# Patient Record
Sex: MALE | Race: BLACK OR AFRICAN AMERICAN | NOT HISPANIC OR LATINO | Employment: STUDENT | URBAN - METROPOLITAN AREA
[De-identification: names, ages, dates, MRNs, and addresses within clinical notes are randomized per-mention and may not be internally consistent; named-entity substitution may affect disease eponyms.]

---
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
## 2023-02-10 ENCOUNTER — TELEPHONE (OUTPATIENT)
Dept: FAMILY MEDICINE | Facility: CLINIC | Age: 18
End: 2023-02-10
Payer: COMMERCIAL

## 2023-02-10 NOTE — TELEPHONE ENCOUNTER
Pt situation escalated to manager. Manager able to get pt a appt on 2/15. Pt mother informed, greatly thanked our office and verbalized understanding.

## 2023-02-10 NOTE — TELEPHONE ENCOUNTER
----- Message from Gemini Tanner sent at 2/10/2023 12:16 PM CST -----  Contact: self  Type: Same Day Appointment Request        Caller is requesting a same day appointment. Caller declined first available appointment listed below.        Name of Caller: Patient    Best Call Back Number: [phone]  Additional Information: Patient mom states this pt is disabled he is trying to get into a program which they already accepted him he just has to be seen by a NP or  A Doctor to approve his paperwork Pt is a former pt of Dr. Peterson [facility]. PLZ GET PT INTO his cut off for his application is next week. CALL MOM TO SCHEDULE WITH ANY PROVIDER. THANKS

## 2023-02-10 NOTE — TELEPHONE ENCOUNTER
Called and spoke with pt mother. Informed mother that at this time we don't have any np appts available. Pt mother verbalized understanding.

## 2023-02-15 ENCOUNTER — LAB VISIT (OUTPATIENT)
Dept: LAB | Facility: HOSPITAL | Age: 18
End: 2023-02-15
Attending: STUDENT IN AN ORGANIZED HEALTH CARE EDUCATION/TRAINING PROGRAM
Payer: COMMERCIAL

## 2023-02-15 ENCOUNTER — OFFICE VISIT (OUTPATIENT)
Dept: FAMILY MEDICINE | Facility: CLINIC | Age: 18
End: 2023-02-15
Payer: COMMERCIAL

## 2023-02-15 VITALS
OXYGEN SATURATION: 96 % | BODY MASS INDEX: 29.72 KG/M2 | HEIGHT: 63 IN | HEART RATE: 80 BPM | SYSTOLIC BLOOD PRESSURE: 124 MMHG | RESPIRATION RATE: 18 BRPM | WEIGHT: 167.75 LBS | DIASTOLIC BLOOD PRESSURE: 86 MMHG

## 2023-02-15 DIAGNOSIS — R62.50 DEVELOPMENT DELAY: ICD-10-CM

## 2023-02-15 DIAGNOSIS — Z00.00 ROUTINE GENERAL MEDICAL EXAMINATION AT A HEALTH CARE FACILITY: Primary | ICD-10-CM

## 2023-02-15 DIAGNOSIS — Z00.00 ROUTINE GENERAL MEDICAL EXAMINATION AT A HEALTH CARE FACILITY: ICD-10-CM

## 2023-02-15 LAB
ALBUMIN SERPL BCP-MCNC: 4.5 G/DL (ref 3.5–5.2)
ALP SERPL-CCNC: 108 U/L (ref 55–135)
ALT SERPL W/O P-5'-P-CCNC: 26 U/L (ref 10–44)
ANION GAP SERPL CALC-SCNC: 10 MMOL/L (ref 8–16)
AST SERPL-CCNC: 18 U/L (ref 10–40)
BASOPHILS # BLD AUTO: 0.05 K/UL (ref 0–0.2)
BASOPHILS NFR BLD: 0.9 % (ref 0–1.9)
BILIRUB SERPL-MCNC: 1.1 MG/DL (ref 0.1–1)
BUN SERPL-MCNC: 12 MG/DL (ref 6–20)
CALCIUM SERPL-MCNC: 9.8 MG/DL (ref 8.7–10.5)
CHLORIDE SERPL-SCNC: 108 MMOL/L (ref 95–110)
CHOLEST SERPL-MCNC: 161 MG/DL (ref 120–199)
CHOLEST/HDLC SERPL: 4.4 {RATIO} (ref 2–5)
CO2 SERPL-SCNC: 24 MMOL/L (ref 23–29)
CREAT SERPL-MCNC: 1.2 MG/DL (ref 0.5–1.4)
DIFFERENTIAL METHOD: ABNORMAL
EOSINOPHIL # BLD AUTO: 0.1 K/UL (ref 0–0.5)
EOSINOPHIL NFR BLD: 1.4 % (ref 0–8)
ERYTHROCYTE [DISTWIDTH] IN BLOOD BY AUTOMATED COUNT: 12.6 % (ref 11.5–14.5)
EST. GFR  (NO RACE VARIABLE): >60 ML/MIN/1.73 M^2
GLUCOSE SERPL-MCNC: 85 MG/DL (ref 70–110)
HCT VFR BLD AUTO: 52.1 % (ref 40–54)
HDLC SERPL-MCNC: 37 MG/DL (ref 40–75)
HDLC SERPL: 23 % (ref 20–50)
HGB BLD-MCNC: 17.6 G/DL (ref 14–18)
IMM GRANULOCYTES # BLD AUTO: 0.03 K/UL (ref 0–0.04)
IMM GRANULOCYTES NFR BLD AUTO: 0.5 % (ref 0–0.5)
LDLC SERPL CALC-MCNC: 107.8 MG/DL (ref 63–159)
LYMPHOCYTES # BLD AUTO: 2.1 K/UL (ref 1–4.8)
LYMPHOCYTES NFR BLD: 36.6 % (ref 18–48)
MCH RBC QN AUTO: 27.8 PG (ref 27–31)
MCHC RBC AUTO-ENTMCNC: 33.8 G/DL (ref 32–36)
MCV RBC AUTO: 82 FL (ref 82–98)
MONOCYTES # BLD AUTO: 0.5 K/UL (ref 0.3–1)
MONOCYTES NFR BLD: 9.5 % (ref 4–15)
NEUTROPHILS # BLD AUTO: 2.9 K/UL (ref 1.8–7.7)
NEUTROPHILS NFR BLD: 51.1 % (ref 38–73)
NONHDLC SERPL-MCNC: 124 MG/DL
NRBC BLD-RTO: 0 /100 WBC
PLATELET # BLD AUTO: 279 K/UL (ref 150–450)
PMV BLD AUTO: 12.1 FL (ref 9.2–12.9)
POTASSIUM SERPL-SCNC: 3.5 MMOL/L (ref 3.5–5.1)
PROT SERPL-MCNC: 7.4 G/DL (ref 6–8.4)
RBC # BLD AUTO: 6.33 M/UL (ref 4.6–6.2)
SODIUM SERPL-SCNC: 142 MMOL/L (ref 136–145)
TRIGL SERPL-MCNC: 81 MG/DL (ref 30–150)
WBC # BLD AUTO: 5.71 K/UL (ref 3.9–12.7)

## 2023-02-15 PROCEDURE — 3074F SYST BP LT 130 MM HG: CPT | Mod: CPTII,S$GLB,, | Performed by: STUDENT IN AN ORGANIZED HEALTH CARE EDUCATION/TRAINING PROGRAM

## 2023-02-15 PROCEDURE — 99395 PR PREVENTIVE VISIT,EST,18-39: ICD-10-PCS | Mod: S$GLB,,, | Performed by: STUDENT IN AN ORGANIZED HEALTH CARE EDUCATION/TRAINING PROGRAM

## 2023-02-15 PROCEDURE — 99999 PR PBB SHADOW E&M-EST. PATIENT-LVL IV: CPT | Mod: PBBFAC,,, | Performed by: STUDENT IN AN ORGANIZED HEALTH CARE EDUCATION/TRAINING PROGRAM

## 2023-02-15 PROCEDURE — 80061 LIPID PANEL: CPT | Performed by: STUDENT IN AN ORGANIZED HEALTH CARE EDUCATION/TRAINING PROGRAM

## 2023-02-15 PROCEDURE — 36415 COLL VENOUS BLD VENIPUNCTURE: CPT | Mod: PO | Performed by: STUDENT IN AN ORGANIZED HEALTH CARE EDUCATION/TRAINING PROGRAM

## 2023-02-15 PROCEDURE — 1160F RVW MEDS BY RX/DR IN RCRD: CPT | Mod: CPTII,S$GLB,, | Performed by: STUDENT IN AN ORGANIZED HEALTH CARE EDUCATION/TRAINING PROGRAM

## 2023-02-15 PROCEDURE — 3074F PR MOST RECENT SYSTOLIC BLOOD PRESSURE < 130 MM HG: ICD-10-PCS | Mod: CPTII,S$GLB,, | Performed by: STUDENT IN AN ORGANIZED HEALTH CARE EDUCATION/TRAINING PROGRAM

## 2023-02-15 PROCEDURE — 1159F PR MEDICATION LIST DOCUMENTED IN MEDICAL RECORD: ICD-10-PCS | Mod: CPTII,S$GLB,, | Performed by: STUDENT IN AN ORGANIZED HEALTH CARE EDUCATION/TRAINING PROGRAM

## 2023-02-15 PROCEDURE — 3008F BODY MASS INDEX DOCD: CPT | Mod: CPTII,S$GLB,, | Performed by: STUDENT IN AN ORGANIZED HEALTH CARE EDUCATION/TRAINING PROGRAM

## 2023-02-15 PROCEDURE — 1160F PR REVIEW ALL MEDS BY PRESCRIBER/CLIN PHARMACIST DOCUMENTED: ICD-10-PCS | Mod: CPTII,S$GLB,, | Performed by: STUDENT IN AN ORGANIZED HEALTH CARE EDUCATION/TRAINING PROGRAM

## 2023-02-15 PROCEDURE — 80053 COMPREHEN METABOLIC PANEL: CPT | Performed by: STUDENT IN AN ORGANIZED HEALTH CARE EDUCATION/TRAINING PROGRAM

## 2023-02-15 PROCEDURE — 99395 PREV VISIT EST AGE 18-39: CPT | Mod: S$GLB,,, | Performed by: STUDENT IN AN ORGANIZED HEALTH CARE EDUCATION/TRAINING PROGRAM

## 2023-02-15 PROCEDURE — 1159F MED LIST DOCD IN RCRD: CPT | Mod: CPTII,S$GLB,, | Performed by: STUDENT IN AN ORGANIZED HEALTH CARE EDUCATION/TRAINING PROGRAM

## 2023-02-15 PROCEDURE — 3079F DIAST BP 80-89 MM HG: CPT | Mod: CPTII,S$GLB,, | Performed by: STUDENT IN AN ORGANIZED HEALTH CARE EDUCATION/TRAINING PROGRAM

## 2023-02-15 PROCEDURE — 85025 COMPLETE CBC W/AUTO DIFF WBC: CPT | Performed by: STUDENT IN AN ORGANIZED HEALTH CARE EDUCATION/TRAINING PROGRAM

## 2023-02-15 PROCEDURE — 99999 PR PBB SHADOW E&M-EST. PATIENT-LVL IV: ICD-10-PCS | Mod: PBBFAC,,, | Performed by: STUDENT IN AN ORGANIZED HEALTH CARE EDUCATION/TRAINING PROGRAM

## 2023-02-15 PROCEDURE — 3008F PR BODY MASS INDEX (BMI) DOCUMENTED: ICD-10-PCS | Mod: CPTII,S$GLB,, | Performed by: STUDENT IN AN ORGANIZED HEALTH CARE EDUCATION/TRAINING PROGRAM

## 2023-02-15 PROCEDURE — 3079F PR MOST RECENT DIASTOLIC BLOOD PRESSURE 80-89 MM HG: ICD-10-PCS | Mod: CPTII,S$GLB,, | Performed by: STUDENT IN AN ORGANIZED HEALTH CARE EDUCATION/TRAINING PROGRAM

## 2023-02-15 NOTE — PROGRESS NOTES
"[facility] CLINIC NOTE    Patient Name: Allyson Estrada  YOB: 2005    PRESENTING HISTORY     History of Present Illness:  Mr. Allyson Estrada is a 17 y.o. male     Approved for extra care.   Help with sitting, job placement.   Form completed today. This resembles a 90L but is not labeled as such.     PMHx: developmental delay since age 1. Brain injury.   Surg: myringotomy  Fhx:No colon cancer. No prostate cancer  Social: Lives with mom    ROS      OBJECTIVE:   Vital Signs:  Vitals:    02/15/23 1056   BP: 124/86   Pulse: 80   Resp: 18   SpO2: 96%   Weight: 76.1 kg (167 lb 12.3 oz)   Height: 5' 2.5" (1.588 m)         Physical Exam  Vitals and nursing note reviewed.   Constitutional:       General: He is not in acute distress.     Appearance: He is not toxic-appearing or diaphoretic.   HENT:      Head: Normocephalic and atraumatic.      Right Ear: External ear normal.      Left Ear: External ear normal.   Eyes:      General: No scleral icterus.     Conjunctiva/sclera: Conjunctivae normal.      Pupils: Pupils are equal, round, and reactive to light.   Neck:      Thyroid: No thyromegaly.   Cardiovascular:      Rate and Rhythm: Normal rate and regular rhythm.      Heart sounds: Normal heart sounds. No murmur heard.  Pulmonary:      Effort: Pulmonary effort is normal. No respiratory distress.      Breath sounds: Normal breath sounds. No wheezing or rales.   Musculoskeletal:         General: No tenderness or deformity. Normal range of motion.      Cervical back: Normal range of motion and neck supple.   Lymphadenopathy:      Cervical: No cervical adenopathy.   Skin:     General: Skin is warm and dry.      Findings: No erythema or rash.   Neurological:      Mental Status: He is alert.      Gait: Gait is intact.      Comments: Speech is slow. Recall poor. Oriented to birthdate, current year.   No focal deficits.   Hyperreflexia bilateral patellae   Psychiatric:         Mood and Affect: Mood and affect " normal.         Cognition and Memory: Memory normal.         Judgment: Judgment normal.     ASSESSMENT & PLAN:     Routine general medical examination at a health care facility  -     CBC Auto Differential; Future; Expected date: 02/15/2023  -     Comprehensive Metabolic Panel; Future; Expected date: 02/15/2023  -     Lipid Panel; Future; Expected date: 02/15/2023    Development delay  Forms completed       Jude Méndez MD   Internal Medicine

## 2024-01-23 ENCOUNTER — TELEPHONE (OUTPATIENT)
Dept: PRIMARY CARE CLINIC | Facility: CLINIC | Age: 19
End: 2024-01-23
Payer: COMMERCIAL

## 2024-02-19 ENCOUNTER — OFFICE VISIT (OUTPATIENT)
Dept: FAMILY MEDICINE | Facility: CLINIC | Age: 19
End: 2024-02-19
Payer: COMMERCIAL

## 2024-02-19 VITALS
HEIGHT: 67 IN | DIASTOLIC BLOOD PRESSURE: 78 MMHG | WEIGHT: 194 LBS | BODY MASS INDEX: 30.45 KG/M2 | RESPIRATION RATE: 16 BRPM | SYSTOLIC BLOOD PRESSURE: 128 MMHG

## 2024-02-19 DIAGNOSIS — Z00.00 ROUTINE GENERAL MEDICAL EXAMINATION AT A HEALTH CARE FACILITY: Primary | ICD-10-CM

## 2024-02-19 DIAGNOSIS — G93.1 ANOXIC BRAIN INJURY: ICD-10-CM

## 2024-02-19 PROCEDURE — 3074F SYST BP LT 130 MM HG: CPT | Mod: CPTII,S$GLB,, | Performed by: STUDENT IN AN ORGANIZED HEALTH CARE EDUCATION/TRAINING PROGRAM

## 2024-02-19 PROCEDURE — 99395 PREV VISIT EST AGE 18-39: CPT | Mod: S$GLB,,, | Performed by: STUDENT IN AN ORGANIZED HEALTH CARE EDUCATION/TRAINING PROGRAM

## 2024-02-19 PROCEDURE — 3078F DIAST BP <80 MM HG: CPT | Mod: CPTII,S$GLB,, | Performed by: STUDENT IN AN ORGANIZED HEALTH CARE EDUCATION/TRAINING PROGRAM

## 2024-02-19 PROCEDURE — 1159F MED LIST DOCD IN RCRD: CPT | Mod: CPTII,S$GLB,, | Performed by: STUDENT IN AN ORGANIZED HEALTH CARE EDUCATION/TRAINING PROGRAM

## 2024-02-19 PROCEDURE — 3008F BODY MASS INDEX DOCD: CPT | Mod: CPTII,S$GLB,, | Performed by: STUDENT IN AN ORGANIZED HEALTH CARE EDUCATION/TRAINING PROGRAM

## 2024-02-19 PROCEDURE — 99999 PR PBB SHADOW E&M-EST. PATIENT-LVL IV: CPT | Mod: PBBFAC,,, | Performed by: STUDENT IN AN ORGANIZED HEALTH CARE EDUCATION/TRAINING PROGRAM

## 2024-02-19 PROCEDURE — 1160F RVW MEDS BY RX/DR IN RCRD: CPT | Mod: CPTII,S$GLB,, | Performed by: STUDENT IN AN ORGANIZED HEALTH CARE EDUCATION/TRAINING PROGRAM

## 2024-02-19 NOTE — PROGRESS NOTES
"[facility] MEDICINE CLINIC NOTE    Patient Name: Allyson Estrada  YOB: 2005    PRESENTING HISTORY     History of Present Illness:  Mr. Allyson Estrada is a 18 y.o. male here for annual.     No sig change sin his health.     Doing well and with no complaints.     Mom reports some difficulty with fine motor skills, can't tie shoes. Was getting some therapy at school, but no longer in school. Seemed to be benefiting.     Issues with memory, where things are located in the house. Speech delays.     He is very active. Doing pushups, pullups et cetera. He has gained about 30 lbs in the last year.         ROS      OBJECTIVE:   Vital Signs:  Vitals:    02/19/24 1559 02/19/24 1608   BP: 134/86 128/78   Resp: 16    Weight: 88 kg (194 lb 0.1 oz)    Height: 5' 2.5" (1.588 m)             Physical Exam  Vitals and nursing note reviewed.   Constitutional:       Appearance: He is not diaphoretic.   HENT:      Right Ear: Tympanic membrane and external ear normal.      Left Ear: Tympanic membrane and external ear normal.   Eyes:      General: No scleral icterus.     Conjunctiva/sclera: Conjunctivae normal.      Pupils: Pupils are equal, round, and reactive to light.   Neck:      Thyroid: No thyromegaly.   Cardiovascular:      Rate and Rhythm: Normal rate and regular rhythm.      Heart sounds: Normal heart sounds. No murmur heard.  Pulmonary:      Effort: Pulmonary effort is normal. No respiratory distress.      Breath sounds: Normal breath sounds. No wheezing or rales.   Musculoskeletal:         General: No tenderness or deformity. Normal range of motion.      Cervical back: Normal range of motion and neck supple.   Lymphadenopathy:      Cervical: No cervical adenopathy.   Skin:     General: Skin is warm and dry.      Findings: No erythema or rash.   Neurological:      Mental Status: He is alert and oriented to person, place, and time.      Gait: Gait is intact.      Deep Tendon Reflexes: Reflexes abnormal " (hyperreflexic bilateral patellae.).   Psychiatric:         Mood and Affect: Mood and affect normal.         Cognition and Memory: Memory normal.         Judgment: Judgment normal.         ASSESSMENT & PLAN:     Routine general medical examination at a health care facility    Anoxic brain injury  -     SLP evaluation; Future  -     Ambulatory referral/consult to Physical/Occupational Therapy; Future; Expected date: 02/26/2024      He is in generally good health.   Reviewed and filled out 90L forms today.     Will see if he can get more OT/SLP for ADL assistance.         Jude Méndez  Internal Medicine        Visit complexity inherent to evaluation and management associated with medical care services that serve as the continuing focal point for all needed health care services and/or with medical care services that are part of ongoing care related to a patient's single, serious condition or a complex condition provided today

## 2024-02-22 DIAGNOSIS — G93.1 ANOXIC BRAIN INJURY: Primary | ICD-10-CM

## 2024-02-26 ENCOUNTER — CLINICAL SUPPORT (OUTPATIENT)
Dept: REHABILITATION | Facility: HOSPITAL | Age: 19
End: 2024-02-26
Attending: STUDENT IN AN ORGANIZED HEALTH CARE EDUCATION/TRAINING PROGRAM
Payer: COMMERCIAL

## 2024-02-26 DIAGNOSIS — Z74.09 IMPAIRED FUNCTIONAL MOBILITY, BALANCE, AND ENDURANCE: Primary | ICD-10-CM

## 2024-02-26 DIAGNOSIS — G93.1 ANOXIC BRAIN INJURY: ICD-10-CM

## 2024-02-26 DIAGNOSIS — R27.8 IMPAIRED GROSS MOTOR COORDINATION: ICD-10-CM

## 2024-02-26 PROCEDURE — 97161 PT EVAL LOW COMPLEX 20 MIN: CPT | Mod: PO

## 2024-02-26 NOTE — PROGRESS NOTES
Thank you for your referral. Please see Treatment tab for Initial Evaluation and Plan of Care. Thanks!

## 2024-02-27 ENCOUNTER — CLINICAL SUPPORT (OUTPATIENT)
Dept: REHABILITATION | Facility: HOSPITAL | Age: 19
End: 2024-02-27
Attending: STUDENT IN AN ORGANIZED HEALTH CARE EDUCATION/TRAINING PROGRAM
Payer: COMMERCIAL

## 2024-02-27 DIAGNOSIS — G93.1 ANOXIC BRAIN INJURY: ICD-10-CM

## 2024-02-27 PROBLEM — R27.8 IMPAIRED GROSS MOTOR COORDINATION: Status: ACTIVE | Noted: 2024-02-27

## 2024-02-27 PROBLEM — Z74.09 IMPAIRED FUNCTIONAL MOBILITY, BALANCE, AND ENDURANCE: Status: ACTIVE | Noted: 2024-02-27

## 2024-02-27 PROCEDURE — 92522 EVALUATE SPEECH PRODUCTION: CPT | Mod: PO

## 2024-02-27 NOTE — PLAN OF CARE
"[facility]   Physical Therapy Initial Evaluation      Name: Allyson Estrada  Clinic Number: [id number]    Therapy Diagnosis:   Encounter Diagnoses   Name Primary?    Anoxic brain injury     Impaired functional mobility, balance, and endurance Yes    Impaired gross motor coordination         Physician: Jude Méndez MD    Physician Orders: PT Eval and Treat Neuro Program   Medical Diagnosis from Referral: Anoxic brain injury [G93.1]   Evaluation Date: 2/26/2024  Authorization Period Expiration: 2/18/2025  Plan of Care Expiration: 4/22/24  Progress Note Due: 3/25/24  Date of Surgery: NA  Visit # / Visits authorized: 1/ 1   FOTO: 1/ 3    Precautions: Standard, Fall, and developmental delay     Time In: 1600 (Pt arrived early)   Time Out: 1648  Total Billable Time: 48 minutes    Subjective     Date of onset: 2005 (brain injury due to heat stroke)    History of current condition - Emerson reports: Mom reports patient has occasional issues with balance. Has a "weird little run". Has difficulty climbing stairs and requires assistance from mom.     Per Family Medicine Note (Jude Méndez MD) on 2/19/24:     History of Present Illness:  Mr. Allyson Estrada is a 18 y.o. male here for annual.      No sig changes in his health.      Doing well and with no complaints.      Mom reports some difficulty with fine motor skills, can't tie shoes. Was getting some therapy at school, but no longer in school. Seemed to be benefiting.      Issues with memory, where things are located in the house. Speech delays.      He is very active. Doing pushups, pullups et cetera. He has gained about 30 lbs in the last year.     Per Family Medicine Note (Jude Méndez MD) on 2/15/23    PMHx: developmental delay since age 1. Brain injury.   Surg: myringotomy  Fhx:No colon cancer. No prostate cancer  Social: Lives with mom    Falls: Denies falls, reports catching himself. Has near falls "sometimes"    Imaging: " "none    Prior Therapy: Had therapy in school prior to COVID shutdown in 2020. No therapy since then.   Social History: Mom and step-dad. Two steps to enter.   Occupation: Not employed   Prior Level of Function: Able to do most activities on his own. Needs help tying shoes.   Current Level of Function: Able to do most activities on his own. Needs help tying shoes.     Pain:  Current 0/10  Location: NA  Description: NA  Aggravating Factors: NA  Easing Factors: NA    Patients goals: Go up and down stairs faster and feel more confident. Learn how balance to ride a bike.      Medical History:   Past Medical History:   Diagnosis Date    Heat stroke 4-2005    Speech delay        Surgical History:   Allyson Estrada  has a past surgical history that includes Tympanostomy tube placement; feeding tube placement ; and Central venous catheter insertion.    Medications:   Allyson currently has no medications in their medication list.    Allergies:   Review of patient's allergies indicates:  No Known Allergies     Objective      Mental status: Patient with delayed processing speed and speech delay. Required assist from mom during interview. Responded best to simple verbal cues and demonstration.        Vision/ Auditory: Mom reports pt has an astigmatism, has glasses but does not wear them. Pt denies difficulty with vision.     Vitals:   Blood Pressure: 123/81 mmHg  Heart Rate: 62 bpm    Posture:   Sitting: WNL  Standing: WNL     Transfers:    Transfers Level of Assistance  Comments   Roll Right Ind.    Roll Left "Ind.    Roll Supine "Ind.    Roll Prone "Ind.    Supine to Sit "Ind.    Sit to Supine "Ind.    Sit to Stand "Ind.    Stand to Sit "Ind.    Transfer from bed to chair "Ind.    Car transfer "Ind.    Toilet Transfer "Ind.        Strength:      Right Lower Extremity Strength Grade Left Lower Extremity Strength Grade   Hip Flexion 4/5 Hip Flexion 4/5   Hip Extension 3/5  Full hip bridge against gravity Hip Extension 3/5  Full " "hip bridge against gravity   Hip Abduction 4+/5 Hip Abduction 4/5   Knee Flexion 4/5 Knee Flexion 4/5   Knee Extension 5/5 Knee Extension 5/5   Ankle Dorsiflexion 5/5 Ankle Dorsiflexion 4+/5   Ankle Plantarflexion 4+/5 Ankle Plantarflexion 4+/5       Coordination:    Upper Extremity:    Dysdiadochokinesia: Impaired bilaterally, decreased speed and impaired sequencing    Finger to nose: Slightly impaired bilaterally (inconsistently returns to nose)     Lower Extremity:    Alternating toe taps: Impaired, unable to sequence    Heel-shin: Intact      Light touch sensation:    Right Lower Extremity: Intact   Left Lower Extremity: Intact    Proprioception: Intact bilateral ankles      Modified Kurt: Not Assessed due to clinical judgement    Balance:    Static sitting balance:Normal  Dynamic sitting balance: Normal    SLS: < 2 sec bilaterally    Ambulation:     Gait Assessment:   - AD used: None  - Assistance: Supervision   - Distance: Community distances  - Speed: 1.2 m/s  - Stairs: Mod I with use of one handrail, reciprocal stepping pattern    Gait Analysis:  Upon observation of gait, patient demonstrates deviations including but not limited to: wide base of support, bilateral toe out, asymmetric stepping pattern, arms held in low guard      Impairments contributing to deviations: balance deficits, history of developmental delay      APTA Core Set Outcome Measures for Adults with Neurologic Conditions    Evaluation  Reference values    Timed Up and Go 10.3 sec (average of 3 trials);   score >14 seconds indicates risk for falls in older stroke patients (Leif et al, 2006)   10 Meter Walk Test  1.2 m/sec (6m/5s)   "community ambulator" speed category 0-0.4 m/s = household walker  0.4-0.8 m/s = limited community ambulator   0.8-1.4 m/s = community ambultor  >1.4 m/s = can cross street safely    Functional Gait Assessment  19/30 <22/30 = identifies fall risk in community dwelling older adults   (MCID = 4)   Trupti " Balance Test Not assessed due to clinical judgement    Fall risk cut-off for stroke= 45/56   6 Minute Walk Test   Assess in upcoming visit.  6 Minute Walk Test Distances: Means by Age and Gender    Age Gender Mean   60-69 Female  Male 572 meters (1876 feet)  538 meters (1765 feet)   70-79 Female  Male 527 meters (1729 feet)  471 meters (1545 feet)   80-89 Female  Male 417 meters (1368 feet)  392 meters ( 1286 feet)    LEONOR Mcmullen (2000)      5 times sit-stand   16 seconds   >12 sec= fall risk for general elderly  >10 sec= balance/vestibular dysfunction (<59 y/o)  >14.2 sec= balance/vestibular dysfunction (>59 y/o)  >12 sec= fall risk for stroke     Functional Gait Assessment:   1. Gait on level surface =  3   (3) Normal: less than 5.5 sec, no A.D., no imbalance, normal gait pattern, deviates< 6in   (2) Mild impairment: 7-5.6 sec, uses A.D., mild gait deviations, or deviates 6-10 in   (1) Moderate impairment: > 7 sec, slow speed, imbalance, deviates 10-15 in.   (0) Severe impairment: needs assist, deviates >15 in, reach/touch wall  2. Change in Gait Speed = 2   (3) Normal: smooth change w/o loss of balance or gait deviation, deviates < 6 in, significant difference between speeds   (2) Mild impairment: changes speed, but demonstrates mild gait deviations, deviates 6-10 in, OR no deviations but unable to significantly speed, OR uses A.D.   (1) Moderate impairment: minor changes to speed, OR changes speed w/ significant deviations, deviates 10-15 in, OR  Changes speed , but loses balance & recovers   (0) Severe impairment: cannot change speed, deviates >15 in, or loses balance & needs assist  3. Gait with horizontal head turns  = 2   (3) Normal: no change in gait, deviates <6 in   (2) Mild impairment: slight change in speed, deviates 6-10 in, OR uses A.D.   (1) Moderate impairment: moderate change in speed, deviates 10-15 in   (0) Severe impairment: severe disruption of gait, deviates >15in  4. Gait with vertical head  turns = 2   (3) Normal: no change in gait, deviates <6 in   (2) Mild impairment: slight change in speed, deviates 6-10 in OR uses A.D.   (1) Moderate impairment: moderate change in speed, deviates 10-15 in   (0) Severe impairment: severe disruption of gait, deviates >15 in  5. Gait with pivot turns = 2   (3) Normal: performs safely in 3 sec, no LOB   (2) Mild impairment: performs in >3 sec & no LOB, OR turns safely & requires several steps to regain LOB   (1) Moderate impairment: turns slow, OR requires several small steps for balance following turn & stop   (0) Severe impairment: cannot turn safely, needs assist  6. Step over obstacle = 1   (3) Normal: steps over 2 stacked boxes w/o change in speed or LOB   (2) Mild impairment: able to step over 1 box w/o change in speed or LOB   (1) Moderate impairment: steps over 1 box but must slow down, may require VC   (0) Severe impairment: cannot perform w/o assist  7. Gait with Narrow INDIRA = 1   (3) Normal: 10 steps no staggering   (2) Mild impairment: 7-9 steps   (1) Moderate impairment: 4-7 steps   (0) Severe impairment: < 4 steps or cannot perform w/o assist  8. Gait with eyes closed = 3   (3) Normal: < 7 sec, no A.D., no LOB, normal gait pattern, deviates <6 in   (2) Mild impairment: 7.1-9 sec, mild gait deviations, deviates 6-10 in   (1) Moderate impairment: > 9 sec, abnormal pattern, LOB, deviates 10-15 in   (0) Severe impairment: cannot perform w/o assist, LOB, deviates >15in  9. Ambulating Backwards = 1   (3) Normal: no A.D., no LOB, normal gait pattern, deviates <6in   (2) Mild impairment: uses A.D., slower speed, mild gait deviations, deviates 6-10 in   (1) Moderate impairment: slow speed, abnormal gait pattern, LOB, deviates 10-15 in   (0) Severe impairment: severe gait deviations or LOB, deviates >15in  10. Steps = 2   (3) Normal: alternating feet, no rail   (2) Mild Impairment: alternating feet, uses rail   (1) Moderate impairment: step-to, uses rail   (0)  "Severe impairment: cannot perform safely    Score 19/30     Score:   <22/30 fall risk   <20/30 fall risk in older adults   <18/30 fall risk in Parkinsons         FOTO Balance Survey:       Therapist reviewed FOTO scores for Allyson Estrada on 2/26/2024.   FOTO documents entered into [facility] [employer] - see Media section.    Functional Status Score: 51          Treatment     No treatment provided today due to time spent conducting patient interview, objective tests and measures and patient education.     Patient Education and Home Exercises     Education provided:   - Educated on role of PT in outpatient setting.   - Educated on PT Plan of Care.   - Education and instruction on all outcome measures performed today.     Written Home Exercises Provided: Provide in upcoming visit.     Assessment     Allyson is a 18 y.o. male referred to outpatient Physical Therapy with a medical diagnosis of Anoxic brain injury [G93.1]. Patient presents with signs and symptoms consistent with diagnosis outlined in "therapy diagnoses" section of initial evaluation. Additionally, he is limited in safety and independence with functional mobility secondary to above impairments. During evaluation today, patient participated in assessment of lower extremity muscle strength via MMT. he presents with minor deficits in strength. During today's evaluation, he performed standardized outcome measures to assess standing balance, gait speed, and fall risk including the Functional Gait Assessment, Timed Up and Go, 10-Meter Walk Test and 5 Time Sit to Stand Test. Scores on the Functional Gait Assessment and 5- Time Sit to Stand Test identified that he is at an increased risk for falls and decreased independence with mobility. According to the 10-Meter Walk Test, he is categorized as a community ambulator. The patient also demonstrated impairments in coordination and bilateral single leg stance.     Allyson is a good candidate for outpatient physical therapy to address " impairments identified in today's evaluation, increase his capacity for community- level activities and high level balance activities and reduce his risk for falls.    Allyson is highly motivated which will positively impact his recovery.    Upon completion of evaluation, patient (and mother) were provided with education regarding goals and plan of care with which he/she/they verbalized understanding and agreement.       Patient prognosis is Good.   Patient will benefit from skilled outpatient Physical Therapy to address the deficits stated above and in the chart below, provide patient /family education, and to maximize patientt's level of independence.     Plan of care discussed with patient: Yes  Patient's spiritual, cultural and educational needs considered and patient is agreeable to the plan of care and goals as stated below:     Anticipated Barriers for therapy: None at this time    Medical Necessity is demonstrated by the following  History  Co-morbidities and personal factors that may impact the plan of care [] LOW: no personal factors / co-morbidities  [x] MODERATE: 1-2 personal factors / co-morbidities  [] HIGH: 3+ personal factors / co-morbidities    Moderate / High Support Documentation:   Co-morbidities affecting plan of care:   Past Medical History:   Diagnosis Date    Heat stroke 4-2005    Speech delay        Personal Factors:   Developmental delay     Examination  Body Structures and Functions, activity limitations and participation restrictions that may impact the plan of care [] LOW: addressing 1-2 elements  [] MODERATE: 3+ elements  [x] HIGH: 4+ elements (please support below)    Moderate / High Support Documentation: Strength, sensation, coordination, proprioception, ambulation, balance     Clinical Presentation [x] LOW: stable  [] MODERATE: Evolving  [] HIGH: Unstable     Decision Making/ Complexity Score: low       Goals:  Short Term Goals: (4 weeks) Date established: Status:    1. Patient will  be provided with an individualized home exercise program.  2/26/24 New   2.  Patient will demonstrate improve endurance and activity tolerance as evidenced by ability to perform Nu-step x 15 minutes without rests breaks with heart rate post-activity equivalent to 50-80% of maximum heart rate.  2/26/24 New       Long Term Goals: (8 weeks) Date established: Status:    1. Patient will be independent and compliant with updated home exercise program. 2/26/24   New   2.  Patient will increase his gait speed as assessed by the Timed 10 Meter Walk Test from 1.2 m/s to >1.4 m/s to increase his safety and independence with gait at a community level. (Minimal Clinically Important Difference for older adults = 0.13 m/s)   2/26/24   New     3. Patient will improve his score on the Functional Gait Assessment (FGA) from 19/30 to at least 23/30, indicating improved safety and (I) with dynamic, community level gait. (Minimal Detectable Change for older adults= 4 points)  2/26/24 New   4. Patient will maintain bilateral single leg stance for at least 15 seconds to improve standing balance and overall safety with functional tasks.  2/26/24 New   5. Patient will perform 5 sit to stands in < 10 seconds to demonstrate a reduction in fall risk.  2/26/24 New   6. Patient will begin some form of community fitness to begin regular and consistent performance of exercise to continue maintenance of gains made in physical therapy.  2/26 24 New     Plan     Plan of care Certification: 2/26/2024 to 4/22/24.    Outpatient Physical Therapy 2 times weekly for 8 weeks to include the following interventions: Gait Training, Neuromuscular Re-ed, Patient Education, Therapeutic Activities, and Therapeutic Exercise.     Brianna Lindsey PT        Physician's Signature: _________________________________________ Date: ________________

## 2024-02-27 NOTE — Clinical Note
Hi Dr. Méndez,  We completed an speech therapy evaluation with this patient. Given some voice complaints his mother mentioned and it being 10 years since seen by ENT, we recommend him see ENT for further assessment of vocal function. If agreed, would you mind placing an order for ENT?   Thank you for your referral!  KALLI Boyle, CCC-SLP, IS Speech-Language Pathology 2/27/2024

## 2024-02-27 NOTE — PLAN OF CARE
[facility]  Speech Therapy Evaluation -Neurological Rehabilitation    Date: 2/27/2024     Name: Allyson Estrada   MRN: [id number]    Therapy Diagnosis:   Encounter Diagnosis   Name Primary?    Anoxic brain injury     Physician: Jude Méndez MD  Physician Orders: Ambulatory Referral to Speech Therapy   Medical Diagnosis: Anoxic brain injury [G93.1]     Visit # / Visits Authorized:  1 / 1   Date of Evaluation:  2/27/2024   Insurance Authorization Period: 2/19/2024 to 12/31/2024  Plan of Care Certification:    2/27/2024 to 4/23/2024      Time In: 1610  Time Out: 1655   Total time: 45    Procedure   Evaluation of Speech Sound Production     Precautions: Standard  Subjective   Date of Onset: CVA at 1 years old  History of Current Condition:  Allyson Estrada is a 18 y.o. male who presents to [facility] Outpatient Speech Therapy for evaluation secondary to Anoxic brain injury [G93.1] . Patient was referred to therapy by Jude Méndez MD , which is the patient's primary care physician. 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 Patient's mother reports majority of speech therapy consisted in the schools with supplemental outside of the schools. Patient's mother reports patient then received speech therapy in schools only from 6th grade until 2020. Patient did not return back to school after covid pandemic. Patient and mother deny changes in receptive language, dysphagia, or attention. Patient and mother report difficulty with speech intelligibility and word finding, short term memory. Patient's mother reports low volume - last seen by ENT (per mother) about 10 years ago.   Patient was accompanied to the evaluation by Nancy, his mother.   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  Allyson Estrada  has a past surgical history that includes Tympanostomy tube placement; feeding tube placement ; and Central venous  "catheter insertion.  Medical Hx and Allergies: Allyson currently has no medications in their medication list. Review of patient's allergies indicates:  No Known Allergies    Per PCP note on 2/19/2024 with Dr. Méndez:  History of Present Illness:  Mr. Allyson Estrada is a 18 y.o. male here for annual.      No sig change sin his health.      Doing well and with no complaints.      Mom reports some difficulty with fine motor skills, can't tie shoes. Was getting some therapy at school, but no longer in school. Seemed to be benefiting.      Issues with memory, where things are located in the house. Speech delays.      He is very active. Doing pushups, pullups et cetera. He has gained about 30 lbs in the last year.     Prior Therapy:  speech therapy from 1 years old - 2020  Social History:  Patient lives with family  Occupation:  n/a  Prior Level of Function: improved verbal expression (patient's mother reports patient initially nonverbal)   Current Level of Function: continued difficulty regarding speech intelligibility, low vocal volume,   Pain Scale: no pain indicated throughout session  Patient's Therapy Goals:  mother: "to be able to communicate with other people better - not to repeat as often"  Objective   Formal Assessment:  MOTOR SPEECH/DYSARTHRIA EVALUATION    History  Diagnosis: CVA (unable to locate location in patient chart or in interview with patient's mother)  Localization: n/a  Associated Deficits: Cognitive/Linguistic    Communication Related Quality of Life  The Communication Participation Item Bank was administered to the patient to assess for the impact of [his/her] motor speech deficit on [his/her] quality of life.  T-Score: 38.4  Standard Error: 2.8  Communication QOL Severity: average  Reference scores for the CPIB. Please note that the score ranges should be interpreted with  relation to the reference group, which is 700 people with various communication disorders.  Therefore, a T-score in the " average range would be interpreted as Average communication related  quality of life compared to most people with communication disorders. Someone with  no communication deficits would receive a score of 74.3.  Below Average Average Above Average   17.8-34 35-65 66-74.3       Evaluation of Speech Mechanisms  Respiration:  Posture: WNL  Breath Support: WNL  Breath Rate: WNL, Pt completed 20 breaths in one minute. The norm for breath rate of an adult is between 12 and 18 cycles per minute.   Respiratory Features: Abnormal: Thoracic    Oral Mechanism at Rest   Labial Structures  Structure WNL   Symmetry WNL   Tone WNL   Ability to control secretions WNL       Lingual Structure (at rest in mouth)   Structure WNL   Symmetry WNL   Tone WNL   Irregular Movement WNL       Mandible  Symmetry WNL   Posture at Rest WNL=closed   Dentition WNL     Face  Symmetry WNL   Expression WNL   Irregular Movement WNL     Soft Palate  Symmetry WNL   Structure WNL     Hard Palate  Structure WNL     Oral Mechanism during Sustained Postures   Labial Retraction   Symmetry WNL   Range WNL   Tone WNL   Strength WNL     Labial Protrusion  Range WNL   Tone WNL   Strength WNL     Labial Compression  Strength (Upper R) WNL   Strength (Upper L) WNL   Strength (Lower R) WNL   Strength (Lower L) WNL     Lingual Protrusion  Symmetry WNL   Range WNL   Tone WNL   Tremor WNL   Strength WNL     Lingual Elevation to Alveolar Ridge   Range WNL   Symmetry WNL     Mandible Depression  Symmetry WNL   Range WNL   Jaw Clonus WNL   Strength WNL     Mandible Elevation  Symmetry WNL   Range WNL   Strength WNL     Face: Raising Eyebrows  Symmetry WNL   Range WNL   Forehead Wrinkle WNL     Velopharyngeal Function: Cheek Puffing   Symmetry WNL   Range WNL   Tone WNL   Strength WNL       Oral Mechanism during Movement   Labial Protrusion and Lateralization   Rate WNL   Rhythm WNL     Lingual Lateralization (External)   Rate WNL   Rhythm WNL   Range WNL     Lingual  Lateralization (Internal)  Rate WNL   Rhythm WNL   Range WNL     Circular Range of Motion (External)   Rate WNL   Rhythm WNL   Range WNL     Circular Range of Motion (Internal)  Rate WNL   Rhythm WNL   Range WNL     Velopharyngeal Function: Sustained /a/  Velum Range WNL   Pharyngeal Wall Range Reduced     Velopharyngeal Function: Short Repeating /a/  Velum Range Reduced   Pharyngeal Wall Range Reduced     Gross Sensation  Sensation  Face: Upper L WNL    Upper R WNL    Lower L WNL    Lower R WNL   Lips: Upper L WNL    Upper R WNL    Lower L WNL    Lower R WNL         Alternating Motion Rates (AMRs) and Sequential Motion Rates (SMRs)  SMRs /pu/  Rate Slow   Rhythm Irregular   Accuracy WNL   Norm 5.0-7.1 Ghada/Sec Below norm     SMRs /tu/  Rate Slow   Rhythm Irregular   Accuracy WNL   Norm 4.8-7.1 Ghada/Sec Below norm     SMRs /ku/  Rate Slow   Rhythm Irregular   Accuracy WNL   Norm 4.4-7.5 Ghada/Sec Below norm     AMRs /putuku/  Rate Slow   Rhythm Irregular   Accuracy WNL   Norm 3.6-7.5 Ghada/Sec Below norm     Perceptual Ratings  Respiratory Features: Forced inspiration/expiration  Respiratory/Phonatory Features: Reduced/Excessive loudness  Phonatory Features: Abnormal pitch  Phonatory Quality: Harshness  Articulatory Features: Irregular articulatory breakdowns  Prosodic Features: Slow rate  Other Features: low vocal volume    Diagnosis   OVERALL IMPRESSION:   Patient presents with Severe Ataxic dysarthria characterized by slow articulatory breakdowns, slow rate, low vocal loudness, excess stress.     Treatment   Total Treatment Time Separate from Evaluation: not applicable   No treatment performed secondary to time to complete evaluation.     Education provided:   -role of Speech Therapy, goals/plan of care, scheduling/cancellations, insurance limitations with patient  -Additional Education provided:   Prognosis and speech therapy Plan of Care     Patient and family members expressed understanding.     Home Program: not  established this visit  Assessment     Emerson presents to [facility] status post medical diagnosis of Anoxic brain injury [G93.1 .     Interpretation of objective assessment:   He presents with Severe Ataxic dysarthria characterized by slow articulatory breakdowns, slow rate, low vocal loudness, excess stress.     Demonstrates impairments including limitations as described in the problem list.     Positive prognostic factors: good motivation and family support  Negative prognostic factors: time elapsed since CVA  Barriers to therapy: No barriers to therapy identified.     Patient's spiritual, cultural, and educational needs considered and patient agreeable to plan of care and goals.    Patient will benefit from skilled therapy.    Rehab Potential: fair    Short Term Goals: (6 weeks) Current Progress:   Patient will complete RBANS assessment to determine further speech therapy Plan of Care    Progressing/ Not Met 2/27/2024   Established this date   2. Patient will recall 4/4 clear speech strategies with minimal assist     Progressing/ Not Met 2/27/2024   Established this date   3. Patient will read aloud low level passage with independently use of clear speech strategies with 80% accuracy independently.      Progressing/ Not Met 2/27/2024   Established this date    4. Patient will complete diaphragmatic breathing exercises by producing maximal exhalation via diaphragmic breathing with a duration of 5 seconds in 10 trials, consistently with minimal tactile and verbal clinician cues.      Progressing/ Not Met 2/27/2024   Established this date    5. Patient will complete ENT assessment for clearance of PhoRTE exercises in speech therapy.      Progressing/ Not Met 2/27/2024   Established this date        Long Term Goals: (8 weeks) Current Progress:   He  will develop functional and intelligible speech and utilize compensatory strategies through the use of adequate labial and lingual function, increased  articulatory precision and speech prosody.   Established this date     2. He will use appropriate memory strategies to schedule and recall weekly activities, express needs and recall names to maintain safety and participate socially in functional living environment.       Established this date     3. He  will develop functional cognitive-linguistic based skills and utilize compensatory strategies to communicate wants and needs effectively to different conversational partners, maintain safety, and participate socially in functional living environment.        Established this date     4. He  will demonstrate improved vocal quality and intonation at the conversation level to communicate basic medical and social needs in the functional living environment.       Established this date         Plan     Recommended Treatment Plan:  Patient will participate in the [facility] program for speech therapy 2 times per week for 8 weeks to address his Communication, Cognition, and Motor Speech deficits, to educate patient and their family, and to participate in a home exercise program.    Other Recommendations:   Referral to Otolaryngology (ENT)    Therapist's Name:   Hilda Diop CCC-SLP   2/27/2024

## 2024-02-29 NOTE — PROGRESS NOTES
"  [facility]   Physical Therapy Treatment Note      Name: Allyson Estrada  Clinic Number: [id number]      Therapy Diagnosis:   Encounter Diagnoses   Name Primary?    Impaired functional mobility, balance, and endurance Yes    Impaired gross motor coordination      Physician: Jude Méndez MD    Visit Date: 3/1/2024    Physician Orders: PT Eval and Treat Neuro Program   Medical Diagnosis from Referral: Anoxic brain injury [G93.1]   Evaluation Date: 2/26/2024  Authorization Period Expiration: 2/18/2025  Plan of Care Expiration: 4/22/24  Progress Note Due: 3/25/24  Date of Surgery: NA  Visit # / Visits authorized: 1/ 20 ( 2)   FOTO: 1/ 3    Time In: 8:48 am   Time Out: 9:30 am   Total Billable Time: 42 minutes    Precautions: Standard, Fall, and developmental delay     PTA visit 0/5      Subjective     Pt reports: . Nodding head "yes" when asked how he was doing today.     He was not yet given home exercise program.  Response to previous treatment: good  Functional change: ongoing    Pain: 0/10  Location: NA      Objective      Objective Measures updated at progress report unless specified.     Treatment     Emerson received the treatments listed below:        Emerson received therapeutic exercises to develop strength, endurance, and ROM for 34 minutes including:  -  sci fit level 2 10 minutes  bilateral upper extremity/ lower extremity     In parallel bars :   - standing mini squats 1x15. Cues for proper form  - standing marches 2x10 each lower extremity.   - standing hip abduction 2x10 each lower extremity   - standing hip extension 2x10 each lower extremity   - standing hamstring curls 2x10 each lower extremity   - standing calf raises 2x10 bilateral lower extremity   - standing calf stretch on foam half roll 30 sec x 3 trials each lower extremity      (Therapeutic rest breaks throughout as needed).       Emerson participated in neuromuscular re-education activities to improve: Balance and " Coordination for 8 minutes. The following activities were included:    - standing tandem with small step 30 sec x 3 trials each lower extremity with occasional 1 upper extremity support on bars.   - standing single leg stances 3-5 seconds each lower extremity x 10 trials each    Pt ambulated 120 ft x 2 trials supervision . Noted toe talking and mildly unsteady throughout      Home Exercises Provided and Patient Education Provided     Education provided:   - HEP handout given and went over in detail. Pt reports understanding. Requires reinforcement.     Written Home Exercises Provided: yes.  Exercises were reviewed and Emerson was able to demonstrate them prior to the end of the session.  Emerson demonstrated fair  understanding of the education provided.     See EMR under Patient Instructions for exercises provided 3/1/2024.    Assessment     Tolerated session well. Able to tolerate 10 minutes on sci fit with no issues. Pt given HEP handout and went over in detail. requests frequent rest breaks throughout session. Pt remains motivated to improve and a good candidate for PT.     Emerson Is progressing well towards his goals.   Pt prognosis is Good.     Pt will continue to benefit from skilled outpatient physical therapy to address the deficits listed in the problem list box on initial evaluation, provide pt/family education and to maximize pt's level of independence in the home and community environment.     Pt's spiritual, cultural and educational needs considered and pt agreeable to plan of care and goals.     Anticipated barriers to physical therapy: none at this time.     Goals: Goals:  Short Term Goals: (4 weeks) Date established: Status:    1. Patient will be provided with an individualized home exercise program.  2/26/24 MET   2.  Patient will demonstrate improve endurance and activity tolerance as evidenced by ability to perform Nu-step x 15 minutes without rests breaks with heart rate post-activity equivalent to  50-80% of maximum heart rate.  2/26/24 ongoing         Long Term Goals: (8 weeks) Date established: Status:    1. Patient will be independent and compliant with updated home exercise program. 2/26/24    ongoing   2.  Patient will increase his gait speed as assessed by the Timed 10 Meter Walk Test from 1.2 m/s to >1.4 m/s to increase his safety and independence with gait at a community level. (Minimal Clinically Important Difference for older adults = 0.13 m/s)    2/26/24    ongoing      3. Patient will improve his score on the Functional Gait Assessment (FGA) from 19/30 to at least 23/30, indicating improved safety and (I) with dynamic, community level gait. (Minimal Detectable Change for older adults= 4 points)  2/26/24 ongoing   4. Patient will maintain bilateral single leg stance for at least 15 seconds to improve standing balance and overall safety with functional tasks.  2/26/24 ongoing   5. Patient will perform 5 sit to stands in < 10 seconds to demonstrate a reduction in fall risk.  2/26/24 ongoing   6. Patient will begin some form of community fitness to begin regular and consistent performance of exercise to continue maintenance of gains made in physical therapy.  2/26 24 ongoing        Plan   Plan of care Certification: 2/26/2024 to 4/22/24.     Outpatient Physical Therapy 2 times weekly for 8 weeks to include the following interventions: Gait Training, Neuromuscular Re-ed, Patient Education, Therapeutic Activities, and Therapeutic Exercise.     Recommendations for next treatment session:     Balance exercises    Estela Ann, PT

## 2024-03-01 ENCOUNTER — CLINICAL SUPPORT (OUTPATIENT)
Dept: REHABILITATION | Facility: HOSPITAL | Age: 19
End: 2024-03-01
Payer: COMMERCIAL

## 2024-03-01 DIAGNOSIS — R27.8 IMPAIRED GROSS MOTOR COORDINATION: ICD-10-CM

## 2024-03-01 DIAGNOSIS — Z74.09 IMPAIRED FUNCTIONAL MOBILITY, BALANCE, AND ENDURANCE: Primary | ICD-10-CM

## 2024-03-01 PROCEDURE — 97110 THERAPEUTIC EXERCISES: CPT | Mod: PO

## 2024-03-01 PROCEDURE — 97112 NEUROMUSCULAR REEDUCATION: CPT | Mod: 59,PO

## 2024-03-04 ENCOUNTER — CLINICAL SUPPORT (OUTPATIENT)
Dept: REHABILITATION | Facility: HOSPITAL | Age: 19
End: 2024-03-04
Payer: COMMERCIAL

## 2024-03-04 DIAGNOSIS — R27.8 IMPAIRED GROSS MOTOR COORDINATION: ICD-10-CM

## 2024-03-04 DIAGNOSIS — Z74.09 IMPAIRED FUNCTIONAL MOBILITY, BALANCE, AND ENDURANCE: Primary | ICD-10-CM

## 2024-03-04 DIAGNOSIS — R47.1 DYSARTHRIA: Primary | ICD-10-CM

## 2024-03-04 PROCEDURE — 92507 TX SP LANG VOICE COMM INDIV: CPT | Mod: PO

## 2024-03-04 PROCEDURE — 97110 THERAPEUTIC EXERCISES: CPT | Mod: PO

## 2024-03-04 PROCEDURE — 97112 NEUROMUSCULAR REEDUCATION: CPT | Mod: PO

## 2024-03-04 NOTE — PROGRESS NOTES
[facility]  Speech Therapy Treatment Note- Neurological Rehabilitation  Date: 3/4/2024     Name: Allyson Estrada   MRN: [id number]   Therapy Diagnosis: No diagnosis found.Physician: Jude Méndez MD  Physician Orders: Ambulatory Referral to Speech Therapy   Medical Diagnosis: Anoxic brain injury [G93.1]     Visit #/ Visits Authorized: 1/ 20  Date of Evaluation:  2/27/2024  Insurance Authorization Period: 2/28/2024 - 12/31/2024   Plan of Care Expiration Date:    4/23/2024  Extended Plan of Care:  not applicable    Progress Note: due 3/27/2024     Time In:  1700  Time Out:  1740  Total Billable Time: 40     Precautions: Standard and Communication  Subjective:   Patient reports: No major changes from previous session, hesitant to interact with new Speech Language Pathologist    He was compliant to home exercise program.   Response to previous treatment: good  Pain Scale: no pain indicated throughout session  Objective:         UNTIMED  Speech- Language- Voice Therapy       Short Term Goals: (6 weeks) Current Progress:   Patient will complete RBANS assessment to determine further speech therapy Plan of Care See results below    Goal Met 3/4/2024     2. Patient will recall 4/4 clear speech strategies with minimal assist      Progressing/ Not Met 3/4/2024    Worked on loud speech throughout the dynamic testing listed below   3. Patient will read aloud low level passage with independently use of clear speech strategies with 80% accuracy independently.       Progressing/ Not Met 3/4/2024    Not addressed in today's session.    4. Patient will complete diaphragmatic breathing exercises by producing maximal exhalation via diaphragmic breathing with a duration of 5 seconds in 10 trials, consistently with minimal tactile and verbal clinician cues.       Progressing/ Not Met 3/4/2024    Not addressed in today's session.    5. Patient will complete ENT assessment for clearance of PhoRTE exercises in speech  therapy.       Progressing/ Not Met 3/4/2024    Not addressed in today's session.    6. Patient will name objects with 90% accuracy independently.     Progressing/Not Met 3/4/2024  GOAL ADDED   7. Patient will state the function of objects using 3+ word sentences with minimal cues.     Progressing/Not Met 3/4/2024  GOAL ADDED      The Repeatable Battery for the Assessment of Neuropsychological Status (RBANS) Version B was administered to measure the patient's attention, language, visuospatial/constructional abilities, and immediate and delayed memory. The results are outlined below:    Domain Subtest Total Score Index Score   Immediate Memory List Learning 12   40    Story Memory 4    Visuospatial/  Constructional Figure Copy 10   50    Line Orientation 0    Language Picture Naming 7   47    Semantic Fluency 4    Attention Digit Span DNT Not applicable     Coding DNT      Delayed Memory List Recall DNT Not applicable     List Recognition DNT     Story Recall DNT     Figure Recall DNT    Interpretation:    Index score: mean of 100, standard deviation of 15. Therefore, 130+ = Very Superior; 120-129 = Superior; 110-119 = High average;  = Average; 80-89 = Low Average; 70-79 = Borderline; 69 and below = Extremely Low. Apparently the most reliable score; factor in education level.    Adjusted to a dynamic assessment due to limitations of language skills on the efficacy of the assessment.    Immediate Memory Score: Recalling information following immediate presentation is assessed through the List Learning and Story Memory subtests. In the List Learning subtest, the patient is given 10 words to remember. This list is presented four times overall. In this subtest, the patient did demonstrate learning over the 4 trials. The patient recalled 2 items on trial one, 3 items on trial two, 3 items on trial three, and 4 items in trial 4. In the Story Memory subtest, the patient recalled 2 details on the first presentation and  2 details on the second presentation. Results of this domain indicate Extremely low performance.  Visuospatial score: Perceiving spatial relations and constructing a spatially accurate copy of a drawing is assessed through the Figure Copy and Line Orientation subtests. The Figure Copy subtest asks the patient to copy a complex line drawing. The patient attempted the task; however, difficulty with proportions were noted throughout the completed image. The Line Orientation subtest the presents the patient with 12 line displays and asks the patient to match two given lines at the bottom to the display at the top.  The patient correctly identified 0/20. Results of this domain indicate Extremely low performance.  Language score: Naming common items and retrieving learned material is assessed through the Picture Naming and Semantic Fluency subtests. The Picture Naming subtest asks the patient to name 10 line drawings. The patient was able to accurately name 7/10 items. The Semantic Fluency subtest asks the patient to name as many animals as he can in 60 seconds. The patient was able to name 4 animals. These results indicate Extremely low performance.     Western Aphasia Battery - Revised (WAB-R) was administered to evaluate the patient's receptive and expressive language function.The purpose stated in the manual for the WAB-R is to determine the presence, severity, and type of aphasia; measure the patient's level of performance to provide a baseline for detecting change over time; provide a comprehensive assessment of the patients language strengths and deficits in order to guide treatment and management; infer the location and etiology of the lesion causing the aphasia.  The following results were revealed:     Subtest Results:   Yes / No Questions: 57 / 60  Repetition: 56 /100  Object Namin /60  Word Fluency: 4 / 20  Sentence Completion: 10 /10  Responsive Speech: 8 / 10    Description: With naming, the patient did  benefit intermittently with phonemic cues. During the word fluency task, the patient was able to name 4 items within a minute independently which is the same amount noted on the RBANS testing. However, with categorical cues, the patient was able to name 19 item total, ignoring the time limit.     Patient Education/Response:   Patient educated regarding the followin. Using his loud voice    Home program established: yes-use of loud voice  Patient verbalized understanding to all above education provided.     See Electronic Medical Record under Patient Instructions for exercises provided throughout therapy.  Assessment:   Standardized assessment of cognitive functioning and language attempted this date; however, standard score, were not accurately reflecting his current abilities. Therefore, assessment was changed to a dynamic assessments to assess strong areas to use in therapy. Patient benefited from phonemic cues and cues for thought organization. Unclear severity of memory impairment as patient's expressive receptive language delay limits the efficacy of the assessment. 2 goals added    Emerson is progressing well towards his goals. Current goals remain appropriate. Goals to be updated as necessary.     Patient prognosis is Good. Patient will continue to benefit from skilled outpatient speech and language therapy to address the deficits listed in the problem list on initial evaluation, provide patient/family education and to maximize patient's level of independence in the home and community environment.   Medical necessity is demonstrated by the following IMPAIRMENTS:  Aphasia: decreased content words and significant word finding difficulty in all situations severely limiting functional communication with both familiar and unfamiliar communication partners to relay medically and safety relevant information in a timely manner in a state of emergency.    Motor speech: Decreased speech intelligibility results in  decreased efficiency communicating medical and social wants and needs in the case of emergency.    Barriers to Therapy: none identified   Patient's spiritual, cultural and educational needs considered and patient agreeable to plan of care and goals.  Plan:   Continue Plan of Care with focus on rehabilitation and compensation for cognitive communication, dysarthria, and expressive receptive language disorder.    Dayanna Rangel CCC-SLP   3/4/2024

## 2024-03-04 NOTE — PROGRESS NOTES
"  [facility]   Physical Therapy Treatment Note      Name: Allyson Estrada  Clinic Number: [id number]      Therapy Diagnosis:   Encounter Diagnoses   Name Primary?    Impaired functional mobility, balance, and endurance Yes    Impaired gross motor coordination        Physician: Jude Méndez MD    Visit Date: 3/4/2024    Physician Orders: PT Eval and Treat Neuro Program   Medical Diagnosis from Referral: Anoxic brain injury [G93.1]   Evaluation Date: 2/26/2024  Authorization Period Expiration: 2/18/2025  Plan of Care Expiration: 4/22/24  Progress Note Due: 3/25/24  Date of Surgery: NA  Visit # / Visits authorized: 2/ 20 (3)  FOTO: 1/ 3    Time In: 1602  Time Out: 1645  Total Billable Time: 43 minutes    Precautions: Standard, Fall, and developmental delay     PTA visit 0/5      Subjective     Pt reports: "Good." Mom reports patient has been working out a lot at home.     He was not compliant with home exercise program. Reports doing some push-ups.     Response to previous treatment: good  Functional change: ongoing    Pain: Did not rate, reports "a little bit" of pain   Location: bilateral lower extremities       Objective      Objective Measures updated at progress report unless specified.     Treatment     Emerson received the treatments listed below:      Emerson received therapeutic exercises to develop strength, endurance, and ROM for 25 minutes including:    - Recumbent bike x 10 minutes   - 2x10 33 # Sports Art hamstring curl with cues to slowly lower weight   - 2x10 33 # Sports Art knee extension with cues to slowly lower weight   - 2x10 sit <> stand with arms across chest     (Therapeutic rest breaks throughout as needed).     Emerson participated in neuromuscular re-education activities to improve: Balance and Coordination for 18 minutes. The following activities were included:    In parallel bars:   - x4 laps forward stepping over 4 hurdles with reciprocal pattern, no upper extremity " "support   - x4 laps side stepping over 4 hurdles with single upper extremity support, increased time for foot placement and occasional losses in balance   - x4 laps tandem walking, single upper extremity support   - 2x10 right and left toe taps to 6" step with no upper extremity support   - 2x 30 seconds right and left split stance with one foot on 6 inch step, occasional use of single upper extremity for balance   - 3x10" right and left single leg stance with single upper extremity support     Pt ambulated 120 ft x 2 trials supervision. Noted toe walking throughout.       Home Exercises Provided and Patient Education Provided     Education provided:   - Proper form for all exercises included in today's session.      Written Home Exercises Provided: In previous session.   Exercises were reviewed.  Emerson demonstrated fair  understanding of the education provided.     See EMR under Patient Instructions for exercises provided 3/1/2024.    Assessment     Emerson tolerated session well with focus on bilateral lower extremity strength and balance. Able to tolerate 10 minutes on recumbent bike with no reports of fatigue and occasional changes in direction of pedal stroke which improved with cues from PT. Pt demonstrated improvements in single leg balance today. Able to safely forward step over obstacles with increased time and no upper extremity support. Demo occasional losses in balance when side stepping over obstacles. Pt remains motivated to improve and a good candidate for PT.    Emerson Is progressing well towards his goals.   Pt prognosis is Good.     Pt will continue to benefit from skilled outpatient physical therapy to address the deficits listed in the problem list box on initial evaluation, provide pt/family education and to maximize pt's level of independence in the home and community environment.     Pt's spiritual, cultural and educational needs considered and pt agreeable to plan of care and goals.     Anticipated " barriers to physical therapy: none at this time.     Goals: Goals:  Short Term Goals: (4 weeks) Date established: Status:    1. Patient will be provided with an individualized home exercise program.  2/26/24 MET   2.  Patient will demonstrate improve endurance and activity tolerance as evidenced by ability to perform Nu-step x 15 minutes without rests breaks with heart rate post-activity equivalent to 50-80% of maximum heart rate.  2/26/24 ongoing         Long Term Goals: (8 weeks) Date established: Status:    1. Patient will be independent and compliant with updated home exercise program. 2/26/24    ongoing   2.  Patient will increase his gait speed as assessed by the Timed 10 Meter Walk Test from 1.2 m/s to >1.4 m/s to increase his safety and independence with gait at a community level. (Minimal Clinically Important Difference for older adults = 0.13 m/s)    2/26/24    ongoing      3. Patient will improve his score on the Functional Gait Assessment (FGA) from 19/30 to at least 23/30, indicating improved safety and (I) with dynamic, community level gait. (Minimal Detectable Change for older adults= 4 points)  2/26/24 ongoing   4. Patient will maintain bilateral single leg stance for at least 15 seconds to improve standing balance and overall safety with functional tasks.  2/26/24 ongoing   5. Patient will perform 5 sit to stands in < 10 seconds to demonstrate a reduction in fall risk.  2/26/24 ongoing   6. Patient will begin some form of community fitness to begin regular and consistent performance of exercise to continue maintenance of gains made in physical therapy.  2/26 24 ongoing        Plan   Plan of care Certification: 2/26/2024 to 4/22/24.     Outpatient Physical Therapy 2 times weekly for 8 weeks to include the following interventions: Gait Training, Neuromuscular Re-ed, Patient Education, Therapeutic Activities, and Therapeutic Exercise.     Recommendations for next treatment session:     Balance  exercises    Brianna Lindsey, PT

## 2024-03-05 ENCOUNTER — CLINICAL SUPPORT (OUTPATIENT)
Dept: REHABILITATION | Facility: HOSPITAL | Age: 19
End: 2024-03-05
Attending: STUDENT IN AN ORGANIZED HEALTH CARE EDUCATION/TRAINING PROGRAM
Payer: COMMERCIAL

## 2024-03-05 DIAGNOSIS — R53.1 DECREASED STRENGTH: Primary | ICD-10-CM

## 2024-03-05 DIAGNOSIS — G93.1 ANOXIC BRAIN INJURY: ICD-10-CM

## 2024-03-05 DIAGNOSIS — R27.8 DECREASED COORDINATION: ICD-10-CM

## 2024-03-05 DIAGNOSIS — Z78.9 DEFICITS IN ACTIVITIES OF DAILY LIVING: ICD-10-CM

## 2024-03-05 PROCEDURE — 97166 OT EVAL MOD COMPLEX 45 MIN: CPT | Mod: PO

## 2024-03-05 NOTE — PROGRESS NOTES
[facility]   Occupational Therapy Initial Neurological Evaluation     Name: Allyson Estrada  Clinic Number: [id number]    Therapy Diagnosis: No diagnosis found.  Physician: Jude Méndez MD    Physician Orders: Eval and Treat  Medical Diagnosis: G93.1 (ICD-10-CM) - Anoxic brain injury   Evaluation Date: 3/5/2024  Insurance Authorization Period Expiration:   Plan of Care Certification Period: 5/28/2024   Progress note due: 4/5/2024, 5/3/2024  Date of Return to MD: To be determined  Visit # / Visits authorized: 1 / 1  FOTO: 0/ 3 To be assessed    Precautions:  Standard    Time In: 4:50 PM  Time Out: 5:35 PM  Total Billable Time: 45 minutes    Subjective     Date of Onset: 2005    History of Current Condition: Pt's mom present in eval today to assist with history and assistance answering questions. Pt suffered anoxic brain injury due to heat stroke. Mom reports development delay.  He received school therapy until Covid, they are seeking continued therapy. Mom states that he is not completely independent at home and has concerns regarding his fine motor skills.    Involved Side: n/a  Dominant Side: Right, sometimes ambidextrous     Surgical Procedure: N/a  Imaging: No imaging available     Previous Therapy: School OT/PT/SLP, SLP outside of school, adaptive PE    Pain:  Pain Related Behaviors Observed: No   Functional Pain Scale Rating 0-10:   0/10 on average    Occupation:  Not currently in school; does not have a job at this time  Working presently: n/a  Duties: Makes bed, folds clothes, hangs clothes, takes trash, dishes in , vacuum room (frequently without prompting)    Functional Limitations/Social History:    Current Functional Status   Home/Living environment: lives with their family    - 1 story home, 2 steps to enter then 1 step down    - DME: n/a       Limitation of Functional Status as follows:   ADLs/IADLs:     - Feeding: Does not fix plate, but able to bring plate to  table, unable to use knife, fills own water bottle, microwave meals, prepares cereal.    - Bathing: takes standing shower, no assistance to reach his back but pt would like to better reach during shower, no assistance required to enter/exit shower (low threshold).    - Dressing: requires assistance with tying shoes, donning socks, and shoe orientation, difficulty with shirt orientation, requires assistance with buttons.  - Grooming: requires min a with brushing hair, goes to hall for shaving face    - Home Management: completes simple meal prep using microwave, makes cereal but no sandwiches due to difficulty gauging portions (condiments/meat), completes chores.    - Driving: n/a    - Bed mobility: (I)    - Hygiene: has trouble with gauging amount of toothpaste    - Toileting: Completes toilet hygiene independently, parent reports inefficiency with wiping often     Leisure: video games, movies (super hero), outside sometimes, painting, WWE    Patient's Goals for Therapy: Shoe tying, independence with ADLs/IADLs, fine motor skills, handwriting    Past Medical History/Physical Systems Review:     Past Medical History:  Allyson Estrada  has a past medical history of Heat stroke and Speech delay.    Past Surgical History:  Allyson Estrada  has a past surgical history that includes Tympanostomy tube placement; feeding tube placement ; and Central venous catheter insertion.    Current Medications:  Allyson currently has no medications in their medication list.    Allergies:  Review of patient's allergies indicates:  No Known Allergies       Objective     Cognitive Exam:  Oriented: Person, Place, Time, and Situation  Behaviors: normal, friendly and cooperative, redirectable, eye contact normal  Follows Commands/attention: Follows multistep  commands  Communication: Delayed responses to questions. Responds in short, appropriate sentences  Memory: Not assessed today   Safety awareness/insight to disability: aware of diagnosis,  treatment, and prognosis  Coping skills/emotional control: Appropriate to situation    Visual/Perceptual: wears glasses, astigmatism     Physical Exam:  Postural examination/scapula alignment: Slouched posture  Joint integrity: No concerns  Skin integrity: No concerns, visible skin intact  Edema: No concerns     Joint Evaluation  AROM  3/5/2024 AROM  3/5/2024    Right  Left    Shoulder flex 0-180 WNL WNL   Shoulder Abd 0-180 WNL WNL   Shoulder ER 0-90 WNL WNL   Shoulder IR 0-90 WNL WNL   Shoulder Extension 0-80 WNL WNL   Shoulder Horizontal adduction 0-90 WNL WNL   Elbow flex/ext 0-150 WNL WNL   Wrist flex 0-80 WFL WFL   Wrist ext 0-70 WFL WFL   Supination 0-80 WNL WNL   Pronation 0-80 WNL WNL     Fist: normal    Strength not assessed due to time constraint      Hand Strength (SALOME Dynamometer, Setting II)   (lbs) Left  3/5/2024  Right  3/5/2024    1 61 43   2 64 47   3 65 49   avg 63.3 46.3   [norms for men aged 20-24: R=121.0 +/- 20.6; L=104.5 +/- 21.8 (Maththeresawetz et al, 1985)]    Pinch Left  3/5/2024  Right  3/5/2024    Lateral 20.5 18   Tip (2 point) 12 9   3 jaw lili 12 10      Gross motor coordination:   LING (Rapid Alternating Movements): impaired  Finger to Nose (5 times): impaired  Finger Flicks (coordination moving from digit flexion to digit extension): slightly sluggish    Box and Block Assessment Left Right   3/5/2024 21 26   [norms for men aged 20-24: R=88.2 +/-8.8; L=86.4 +/-8.5 (Ereniowetz et al, 1985)]    Fine motor coordination:   -   Thumb to Finger Opposition: impaired     9 Hole Peg Test (9HPT) Left Right   3/5/2024 53s 54s   [norms for men aged 21-25: R=16.41s +/-1.65s; L=17.5s +/-1.73s (Edy et al, 2003)]     Tone:  Modified Kurt Scale:   0 - No increase in muscle tone    Sensation:  Allyson  reports no concerns (no numbness/tingling)    Balance:   Static Sit - NORMAL: No deviations seen in posture held statically  Dynamic sit- NORMAL: No deviations seen in posture held  statically  Static Stand - NORMAL: No deviations seen in posture held statically  Dynamic stand - NORMAL: No deviations seen in posture held statically    Endurance Deficit: none                    Intake Outcome Measure for FOTO Survey    Therapist reviewed FOTO scores for Allyson Estrada on 3/5/2024.   FOTO report - see Media section or FOTO account episode details.    Intake Score: Not assessed today         Treatment     Total Treatment time separate from Evaluation time:0 minutes      Home Exercises and Patient Education Provided     Education provided:   -role of OT, goals for OT, scheduling/cancellations, insurance limitations with patient.    Written Home Exercises Provided: Not provided today.      Assessment     Allyson Estrada is a 18 y.o. male referred to outpatient occupational therapy and presents with a medical diagnosis of anoxic brain injury.    Following medical record review it is determined that pt will benefit from occupational therapy services in order to maximize independence at home with ADLs, IADLs, and leisure tasks. The following goals were discussed with the patient and patient is in agreement with them as to be addressed in the treatment plan. The patient's rehab potential is Good.     Anticipated barriers to occupational therapy: Potentially transportation as patient does not drive.    Plan of care discussed with patient: Yes  Patient's spiritual, cultural and educational needs considered and patient is agreeable to the plan of care and goals as stated below:     Medical Necessity is demonstrated by the following  Occupational Profile/History  Co-morbidities and personal factors that may impact the plan of care [x] LOW: Brief chart review  [] MODERATE: Expanded chart review   [] HIGH: Extensive chart review    Moderate / High Support Documentation: n/a     Examination  Performance deficits relating to physical, cognitive or psychosocial skills that result in activity limitations and/or  participation restrictions  [] LOW: addressing 1-3 Performance deficits  [] MODERATE: 3-5 Performance deficits  [x] HIGH: 5+ Performance deficits (please support below)    Moderate / High Support Documentation:    Physical:  Control of Voluntary Movement   Strength  Pinch Strength  Gross Motor Coordination  Fine Motor Coordination    Cognitive:  Communication    Psychosocial:    Social Interaction  Habits  Routines  Rituals  Group Participation     Treatment Options [] LOW: Limited options  [] MODERATE: Several options  [x] HIGH: Multiple options      Decision Making/ Complexity Score: Moderate       The following goals were discussed with the patient and patient is in agreement with them as to be addressed in the treatment plan.     Goals:  Short Term Goals:    Short Term Goals (6 weeks) Status When Last Assessed  Progressing/ Met   1) Pt will complete HEP with minimal verbal cues from parent  progressing 3/5/2024    2) Handwriting to be assessed and goal added  progressing 3/5/2024    3) Pt will increase  strength as evidenced by 5# increase on dynamometer bilaterally R: 46.3#  L: 63.3# progressing 3/5/2024    4) Pt to be able to wash his back in the shower independently with adaptive equipment as needed  progressing 3/5/2024    5) Pts parent to report increased independence with oral hygiene including appropriate amount of toothpaste for task  progressing 3/5/2024    6) Pt will tie his shoes with minimal verbal cues   progressing 3/5/2024    7) Pt will prepare a cold meal from start to finish using appropriately portioned ingredients with minimal verbal cues   progressing 3/5/2024        LongTerm Goals (12 weeks) Status When Last Assessed  Progressing/ Met   1) Pt will increase his Box and Blocks score as evidenced by 10 block increase bilaterally to increase gross motor coordination R: 26  L: 21 progressing 3/5/2024    2) Pt will increase fine motor coordination as evidenced by completion of 9 Hole Peg  test in 40 seconds or less bilaterally   R: 54s  L: 53s progressing 3/5/2024    3) Pt will increase  strength as evidenced by 10# increase on dynamometer bilaterally R: 46.3#  L: 63.3# progressing 3/5/2024    4) Pt will use butter knife appropriately with no safety concerns during meal preparation 5/5 attempts  progressing 3/5/2024    5)  Pt will perform UBD / LBD independently, including buttons, fasteners, tying shoes, and with correct orientation of clothing items   progressing 3/5/2024    6) Pts parent will self report increased efficiency with toilet hygiene   progressing 3/5/2024     Additional functional goals to be added as patient progresses and per his priorities.    Plan   Certification Period/Plan of care expiration: 3/5/2024 to 5/28/2024.    Outpatient Occupational Therapy 2 times weekly for 12 weeks to include the following interventions: Patient Education, Self Care, Therapeutic Activities, and Therapeutic Exercise.    DANNIE Kline  3/5/2024        Physician's Signature: _________________________________________ Date: ________________

## 2024-03-07 ENCOUNTER — CLINICAL SUPPORT (OUTPATIENT)
Dept: REHABILITATION | Facility: HOSPITAL | Age: 19
End: 2024-03-07
Payer: COMMERCIAL

## 2024-03-07 DIAGNOSIS — R27.8 IMPAIRED GROSS MOTOR COORDINATION: ICD-10-CM

## 2024-03-07 DIAGNOSIS — Z74.09 IMPAIRED FUNCTIONAL MOBILITY, BALANCE, AND ENDURANCE: Primary | ICD-10-CM

## 2024-03-07 PROBLEM — Z78.9 DEFICITS IN ACTIVITIES OF DAILY LIVING: Status: ACTIVE | Noted: 2024-03-07

## 2024-03-07 PROBLEM — Z78.9 DECREASED INDEPENDENCE WITH ACTIVITIES OF DAILY LIVING: Status: ACTIVE | Noted: 2024-02-27

## 2024-03-07 PROBLEM — R53.1 DECREASED STRENGTH: Status: ACTIVE | Noted: 2024-03-07

## 2024-03-07 PROCEDURE — 97110 THERAPEUTIC EXERCISES: CPT | Mod: PO

## 2024-03-07 PROCEDURE — 97112 NEUROMUSCULAR REEDUCATION: CPT | Mod: PO

## 2024-03-07 NOTE — PROGRESS NOTES
[facility]   Physical Therapy Treatment Note      Name: Allyson Estrada  Clinic Number: [id number]      Therapy Diagnosis:   Encounter Diagnoses   Name Primary?    Impaired functional mobility, balance, and endurance Yes    Impaired gross motor coordination          Physician: Jude Méndez MD    Visit Date: 3/7/2024    Physician Orders: PT Eval and Treat Neuro Program   Medical Diagnosis from Referral: Anoxic brain injury [G93.1]   Evaluation Date: 2/26/2024  Authorization Period Expiration: 2/18/2025  Plan of Care Expiration: 4/22/24  Progress Note Due: 3/25/24  Date of Surgery: NA  Visit # / Visits authorized: 3/ 20 (4)   FOTO: 1/ 3     Time In: 1551 (Pt arrived early)   Time Out: 1634   Total Billable Time: 43 minutes    Precautions: Standard, Fall, and developmental delay     PTA visit 0/5      Subjective     Pt reports: Mom reports they forgot about home exercise program.     He was not compliant with home exercise program.     Response to previous treatment: good  Functional change: ongoing    Pain: 0/10  Location: NA      Objective      Objective Measures updated at progress report unless specified.     Treatment     Emerson received the treatments listed below:      Emerson received therapeutic exercises to develop strength, endurance, and ROM for 25 minutes including:    - Recumbent bike x 10 minutes   - 2x10 33 # Sports Art hamstring curl at slow pace  - 2x10 33 # Sports Art knee extension at slow pace   - x10 sit <> stand with arms across chest  - x15 sit <> stand with ball toss/ catch      (Therapeutic rest breaks throughout as needed).     Emerson participated in neuromuscular re-education activities to improve: Balance and Coordination for 18 minutes. The following activities were included:    In parallel bars:   - x4 laps forward stepping over 4 hurdles with reciprocal pattern, no upper extremity support   - x4 laps side stepping over 4 hurdles with single upper extremity  "support, increased time for foot placement and occasional losses in balance   - x4 laps tandem walking, single upper extremity support  - 2x10 right and left toe taps to 6" step with no upper extremity support   - 2x15 lateral step up and over 6" step with single upper extremity support   -3x30" right and left single leg stance with single upper extremity support as needed     Pt ambulated 120 ft x 2 trials supervision. Noted toe walking throughout.       Home Exercises Provided and Patient Education Provided     Education provided:   - Proper form for all exercises included in today's session.      Written Home Exercises Provided: In previous session.   Exercises were reviewed.  Emerson demonstrated fair  understanding of the education provided.     See EMR under Patient Instructions for exercises provided 3/1/2024.    Assessment     Emerson tolerated session well with focus on bilateral lower extremity strength and balance. He demonstrated improvements in single leg balance today with ability to maintain standing for longer duration and with slightly decreased upper extremity support. Also demonstrated improvements with balance when side stepping over obstacles. Emerson remains motivated to improve and a good candidate for PT.    Emerson Is progressing well towards his goals.   Pt prognosis is Good.     Pt will continue to benefit from skilled outpatient physical therapy to address the deficits listed in the problem list box on initial evaluation, provide pt/family education and to maximize pt's level of independence in the home and community environment.     Pt's spiritual, cultural and educational needs considered and pt agreeable to plan of care and goals.     Anticipated barriers to physical therapy: none at this time.     Goals: Goals:  Short Term Goals: (4 weeks) Date established: Status:    1. Patient will be provided with an individualized home exercise program.  2/26/24 MET   2.  Patient will demonstrate improve " endurance and activity tolerance as evidenced by ability to perform Nu-step x 15 minutes without rests breaks with heart rate post-activity equivalent to 50-80% of maximum heart rate.  2/26/24 ongoing         Long Term Goals: (8 weeks) Date established: Status:    1. Patient will be independent and compliant with updated home exercise program. 2/26/24    ongoing   2.  Patient will increase his gait speed as assessed by the Timed 10 Meter Walk Test from 1.2 m/s to >1.4 m/s to increase his safety and independence with gait at a community level. (Minimal Clinically Important Difference for older adults = 0.13 m/s)    2/26/24    ongoing      3. Patient will improve his score on the Functional Gait Assessment (FGA) from 19/30 to at least 23/30, indicating improved safety and (I) with dynamic, community level gait. (Minimal Detectable Change for older adults= 4 points)  2/26/24 ongoing   4. Patient will maintain bilateral single leg stance for at least 15 seconds to improve standing balance and overall safety with functional tasks.  2/26/24 ongoing   5. Patient will perform 5 sit to stands in < 10 seconds to demonstrate a reduction in fall risk.  2/26/24 ongoing   6. Patient will begin some form of community fitness to begin regular and consistent performance of exercise to continue maintenance of gains made in physical therapy.  2/26 24 ongoing        Plan   Plan of care Certification: 2/26/2024 to 4/22/24.     Outpatient Physical Therapy 2 times weekly for 8 weeks to include the following interventions: Gait Training, Neuromuscular Re-ed, Patient Education, Therapeutic Activities, and Therapeutic Exercise.     Recommendations for next treatment session:     Balance exercises    Brianna Lindsey, PT

## 2024-03-08 ENCOUNTER — CLINICAL SUPPORT (OUTPATIENT)
Dept: REHABILITATION | Facility: HOSPITAL | Age: 19
End: 2024-03-08
Payer: COMMERCIAL

## 2024-03-08 DIAGNOSIS — R27.8 DECREASED COORDINATION: ICD-10-CM

## 2024-03-08 DIAGNOSIS — Z78.9 DEFICITS IN ACTIVITIES OF DAILY LIVING: ICD-10-CM

## 2024-03-08 DIAGNOSIS — R53.1 DECREASED STRENGTH: Primary | ICD-10-CM

## 2024-03-08 PROCEDURE — 97530 THERAPEUTIC ACTIVITIES: CPT | Mod: PO

## 2024-03-08 PROCEDURE — 97110 THERAPEUTIC EXERCISES: CPT | Mod: PO

## 2024-03-08 NOTE — PROGRESS NOTES
"[facility]   Occupational Therapy Treatment Note     Name: Allyson Estrada  Clinic Number: [id number]    Therapy Diagnosis:   Encounter Diagnoses   Name Primary?    Decreased strength Yes    Deficits in activities of daily living     Decreased coordination      Physician: Jude Méndez MD    Physician Orders: Eval and Treat  Medical Diagnosis: G93.1 (ICD-10-CM) - Anoxic brain injury   Evaluation Date: 3/8/2024  Insurance Authorization Period Expiration:   Plan of Care Certification Period: 5/28/2024   Progress note due: 4/5/2024, 5/3/2024  Date of Return to MD: To be determined  Visit # / Visits authorized: 1/20 (+eval)  FOTO: 1/ 3, last assessed 3/8/2024    Precautions:  Standard    Time In: 0801  Time Out: 0845  Total Billable Time: 44 minutes    Subjective     Pt reports: "Good."  he was compliant with home exercise program given last session, (not given).   Response to previous treatment: no concerns  Functional change: no concerns    Pain: 0/10  Location: N/a      Objective     At 0802, PM=769/75, HR=52             Strength 3/8/2024 3/8/2024   **within available ROM** Right  Left    Shoulder flex 5/5 5/5   Shoulder abd 5/5 5/5   Shoulder ER 5/5 5/5   Shoulder IR 5/5 5/5   Shoulder Extension 5/5 5/5   Shoulder Horizontal adduction nt nt   Elbow flex 4+/5 5/5   Elbow ext 5/5 5/5   Wrist flex 5/5 5/5   Wrist ext 5/5 5/5   Supination 5/5 5/5   Pronation 4+/5 5/5   UD nt nt   RD nt nt     Treatment     Emerson received the treatments listed below:     Therapeutic exercises to develop strength and endurance for 15 minutes, including:    Bilateral upper extremity strength with measurements as noted above.     -UBE x5 min forward/ 5 min back w/ the bilateral UE, level 2.0, standing to increase activity tolerance, strength and coordination for reciprocal movements in the bilateral UE. West avg=12, peak west=18. Patient rated RPE as "easy". During UBE, if therapist tried to engage in " "conversation, patient would stop the task and answer and then restart. Min verbal cues to keep doing the exercise while talking.     X10 squeezes on each hand with Digi-flex 3.2kgs gripper      Therapeutic activities to improve functional performance for 29  minutes, including:    -Patient wrote his name x1 and copied first page of "[place]". Patient skipped words in one sentence. Therapist asked where he was at with the passage and patient was unable to point it out or continue writing after.   Increased time required.     -Pt completed serial opposition with large marbles for 4 groups of 4 to improve FMC.    -Picking up and placing large marbles one at a time in 4 groups of 4 to improve finger<>palm translation for in-hand manipulation.    -Patient tied two knots in large rope to practice tying his shoes. He attempted to do two "bunny ears" and tie them together but could not complete. He required mod verbal cues throughout for sequencing.     -Patient donned and doffed his jacket x1 as well as zipped and unzipped it x1 independently.     Neuromuscular re-education activities to improve Balance and Coordination for 0 minutes. The following activities were included:  N/a    Home Exercises and Education Provided     Education provided:   - Handwriting exercises at home   - Progress towards goals     Written Home Exercises Provided: yes.  Exercises were reviewed and Emerson was able to demonstrate them prior to the end of the session.  Emerson demonstrated good  understanding of the HEP provided.     See EMR under Patient Instructions for exercises provided 3/8/2024.     Assessment     Emerson tolerated his session very well today. Strength was assessed and it is WFL bilaterally. Patient had difficulty multi-tasking during UBE and required min verbal cues to continue with the exercise while talking to therapist. He was able to complete serial opposition and in-hand manipulation with minimal difficulty. He was able " to tie two knots in large rope but did require mod verbal cues for sequencing. He would benefit from continued skilled therapy to address coordination, handwriting, and increase independence with ADLs.     Emerson's rehab potential is Good.     Anticipated barriers to occupational therapy: Potentially transportation as patient does not drive.    Patient's spiritual, cultural and educational needs considered and patient is agreeable to the plan of care and goals as stated below:     Goals:  Short Term Goals:    Short Term Goals (6 weeks) Status When Last Assessed  Progressing/ Met   1) Pt will complete HEP with minimal verbal cues from parent  progressing 3/8/2024    2) Handwriting to be assessed and goal added Completed 3/8/2024. See goal below.  Completed 3/8/2024    3) Pt will increase  strength as evidenced by 5# increase on dynamometer bilaterally R: 46.3#  L: 63.3# progressing 3/8/2024    4) Pt to be able to wash his back in the shower independently with adaptive equipment as needed  progressing 3/8/2024    5) Pts parent to report increased independence with oral hygiene including appropriate amount of toothpaste for task  progressing 3/8/2024    6) Pt will tie his shoes with minimal verbal cues   progressing 3/8/2024    7) Pt will prepare a cold meal from start to finish using appropriately portioned ingredients with minimal verbal cues   progressing 3/8/2024    8) New goal 3/8/2024: Patient will be able to copy 2 sentences from a book without skipping words with 80% legibility.   progressing 3/8/2024         LongTerm Goals (12 weeks) Status When Last Assessed  Progressing/ Met   1) Pt will increase his Box and Blocks score as evidenced by 10 block increase bilaterally to increase gross motor coordination R: 26  L: 21 progressing 3/8/2024    2) Pt will increase fine motor coordination as evidenced by completion of 9 Hole Peg test in 40 seconds or less bilaterally   R: 54s  L: 53s progressing 3/8/2024    3) Pt  will increase  strength as evidenced by 10# increase on dynamometer bilaterally R: 46.3#  L: 63.3# progressing 3/8/2024    4) Pt will use butter knife appropriately with no safety concerns during meal preparation 5/5 attempts  progressing 3/8/2024    5)  Pt will perform UBD / LBD independently, including buttons, fasteners, tying shoes, and with correct orientation of clothing items   progressing 3/8/2024    6) Pts parent will self report increased efficiency with toilet hygiene   progressing 3/8/2024     Additional functional goals to be added as patient progresses and per his priorities.    Plan   Certification Period/Plan of care expiration: 3/8/2024 to 5/28/2024.    Outpatient Occupational Therapy 2 times weekly for 12 weeks to include the following interventions: Patient Education, Self Care, Therapeutic Activities, and Therapeutic Exercise.    Updates/Grading for next session: continue handwriting, upgrade UBE, in-hand manipulation,  strength     DANNIE Cowan   3/8/2024      I certify that I was present in the room directing the student in service delivery and guiding them using my skilled judgment. As the co-signing therapist I have reviewed the students documentation and am responsible for the treatment, assessment, and plan.   ERWIN Douglas, CELINAR  3/8/2024

## 2024-03-08 NOTE — PLAN OF CARE
[facility]   Occupational Therapy Initial Neurological Evaluation     Name: Allyson Estrada  Clinic Number: [id number]    Therapy Diagnosis:   Encounter Diagnoses   Name Primary?    Anoxic brain injury     Decreased strength Yes    Deficits in activities of daily living     Decreased coordination      Physician: Jude Méndez MD    Physician Orders: Eval and Treat  Medical Diagnosis: G93.1 (ICD-10-CM) - Anoxic brain injury   Evaluation Date: 3/5/2024  Insurance Authorization Period Expiration: 12/31/2024  Plan of Care Certification Period: 5/28/2024   Progress note due: 4/5/2024, 5/3/2024  Date of Return to MD: To be determined  Visit # / Visits authorized: 1 / 1  FOTO: 0/ 3 To be assessed    Precautions:  Standard    Time In: 4:50 PM  Time Out: 5:35 PM  Total Billable Time: 45 minutes    Subjective     Date of Onset: 2005    History of Current Condition: Pt's mom present in eval today to assist with history and assistance answering questions. Pt suffered anoxic brain injury due to heat stroke. Mom reports development delay.  He received school therapy until Covid, they are seeking continued therapy. Mom states that he is not completely independent at home and has concerns regarding his fine motor skills.    Involved Side: n/a  Dominant Side: Right, sometimes ambidextrous     Surgical Procedure: N/a  Imaging: No imaging available     Previous Therapy: School OT/PT/SLP, SLP outside of school, adaptive PE    Pain:  Pain Related Behaviors Observed: No   Functional Pain Scale Rating 0-10:   0/10 on average    Occupation:  Not currently in school; does not have a job at this time  Working presently: n/a  Duties: Makes bed, folds clothes, hangs clothes, takes trash, dishes in , vacuum room (frequently without prompting)    Functional Limitations/Social History:    Current Functional Status   Home/Living environment: lives with their family    - 1 story home, 2 steps to enter then 1  step down    - DME: n/a       Limitation of Functional Status as follows:   ADLs/IADLs:     - Feeding: Does not fix plate, but able to bring plate to table, unable to use knife, fills own water bottle, microwave meals, prepares cereal.    - Bathing: takes standing shower, no assistance to reach his back but pt would like to better reach during shower, no assistance required to enter/exit shower (low threshold).    - Dressing: requires assistance with tying shoes, donning socks, and shoe orientation, difficulty with shirt orientation, requires assistance with buttons.  - Grooming: requires min a with brushing hair, goes to hall for shaving face    - Home Management: completes simple meal prep using microwave, makes cereal but no sandwiches due to difficulty gauging portions (condiments/meat), completes chores.    - Driving: n/a    - Bed mobility: (I)    - Hygiene: has trouble with gauging amount of toothpaste    - Toileting: Completes toilet hygiene independently, parent reports inefficiency with wiping often     Leisure: video games, movies (super hero), outside sometimes, painting, WWE    Patient's Goals for Therapy: Shoe tying, independence with ADLs/IADLs, fine motor skills, handwriting    Past Medical History/Physical Systems Review:     Past Medical History:  Allyson Estrada  has a past medical history of Heat stroke and Speech delay.    Past Surgical History:  Allyson Estrada  has a past surgical history that includes Tympanostomy tube placement; feeding tube placement ; and Central venous catheter insertion.    Current Medications:  Allyson currently has no medications in their medication list.    Allergies:  Review of patient's allergies indicates:  No Known Allergies       Objective     Cognitive Exam:  Oriented: Person, Place, Time, and Situation  Behaviors: normal, friendly and cooperative, redirectable, eye contact normal  Follows Commands/attention: Follows multistep  commands  Communication: Delayed  responses to questions. Responds in short, appropriate sentences  Memory: Not assessed today   Safety awareness/insight to disability: aware of diagnosis, treatment, and prognosis  Coping skills/emotional control: Appropriate to situation    Visual/Perceptual: wears glasses, astigmatism     Physical Exam:  Postural examination/scapula alignment: Slouched posture  Joint integrity: No concerns  Skin integrity: No concerns, visible skin intact  Edema: No concerns     Joint Evaluation  AROM  3/5/2024 AROM  3/5/2024    Right  Left    Shoulder flex 0-180 WNL WNL   Shoulder Abd 0-180 WNL WNL   Shoulder ER 0-90 WNL WNL   Shoulder IR 0-90 WNL WNL   Shoulder Extension 0-80 WNL WNL   Shoulder Horizontal adduction 0-90 WNL WNL   Elbow flex/ext 0-150 WNL WNL   Wrist flex 0-80 WFL WFL   Wrist ext 0-70 WFL WFL   Supination 0-80 WNL WNL   Pronation 0-80 WNL WNL     Fist: normal    Strength not assessed due to time constraint      Hand Strength (SALOME Dynamometer, Setting II)   (lbs) Left  3/5/2024  Right  3/5/2024    1 61 43   2 64 47   3 65 49   avg 63.3 46.3   [norms for men aged 20-24: R=121.0 +/- 20.6; L=104.5 +/- 21.8 (Doniwetz et al, 1985)]    Pinch Left  3/5/2024  Right  3/5/2024    Lateral 20.5 18   Tip (2 point) 12 9   3 jaw lili 12 10      Gross motor coordination:   LING (Rapid Alternating Movements): impaired  Finger to Nose (5 times): impaired  Finger Flicks (coordination moving from digit flexion to digit extension): slightly sluggish    Box and Block Assessment Left Right   3/5/2024 21 26   [norms for men aged 20-24: R=88.2 +/-8.8; L=86.4 +/-8.5 (Doniwetz et al, 1985)]    Fine motor coordination:   -   Thumb to Finger Opposition: impaired     9 Hole Peg Test (9HPT) Left Right   3/5/2024 53s 54s   [norms for men aged 21-25: R=16.41s +/-1.65s; L=17.5s +/-1.73s (Edy et al, 2003)]     Tone:  Modified Kurt Scale:   0 - No increase in muscle tone    Sensation:  Allyson  reports no concerns (no  numbness/tingling)    Balance:   Static Sit - NORMAL: No deviations seen in posture held statically  Dynamic sit- NORMAL: No deviations seen in posture held statically  Static Stand - NORMAL: No deviations seen in posture held statically  Dynamic stand - NORMAL: No deviations seen in posture held statically    Endurance Deficit: none                    Intake Outcome Measure for FOTO Survey    Therapist reviewed FOTO scores for Allyson Estrada on 3/5/2024.   FOTO report - see Media section or FOTO account episode details.    Intake Score: Not assessed today         Treatment     Total Treatment time separate from Evaluation time:0 minutes    Home Exercises and Patient Education Provided     Education provided:   -role of OT, goals for OT, scheduling/cancellations, insurance limitations with patient.    Written Home Exercises Provided: Not provided today.    Assessment     Allyson Estrada is a 18 y.o. male referred to outpatient occupational therapy and presents with a medical diagnosis of anoxic brain injury.    Following medical record review it is determined that pt will benefit from occupational therapy services in order to maximize independence at home with ADLs, IADLs, and leisure tasks. The following goals were discussed with the patient and patient is in agreement with them as to be addressed in the treatment plan. The patient's rehab potential is Good.     Anticipated barriers to occupational therapy: Potentially transportation as patient does not drive.    Plan of care discussed with patient: Yes  Patient's spiritual, cultural and educational needs considered and patient is agreeable to the plan of care and goals as stated below:     Medical Necessity is demonstrated by the following  Occupational Profile/History  Co-morbidities and personal factors that may impact the plan of care [x] LOW: Brief chart review  [] MODERATE: Expanded chart review   [] HIGH: Extensive chart review    Moderate / High Support  Documentation: n/a     Examination  Performance deficits relating to physical, cognitive or psychosocial skills that result in activity limitations and/or participation restrictions  [] LOW: addressing 1-3 Performance deficits  [] MODERATE: 3-5 Performance deficits  [x] HIGH: 5+ Performance deficits (please support below)    Moderate / High Support Documentation:    Physical:  Control of Voluntary Movement   Strength  Pinch Strength  Gross Motor Coordination  Fine Motor Coordination    Cognitive:  Communication    Psychosocial:    Social Interaction  Habits  Routines  Rituals  Group Participation     Treatment Options [] LOW: Limited options  [] MODERATE: Several options  [x] HIGH: Multiple options      Decision Making/ Complexity Score: Moderate       The following goals were discussed with the patient and patient is in agreement with them as to be addressed in the treatment plan.     Goals:  Short Term Goals:    Short Term Goals (6 weeks) Status When Last Assessed  Progressing/ Met   1) Pt will complete HEP with minimal verbal cues from parent  progressing 3/5/2024    2) Handwriting to be assessed and goal added  progressing 3/5/2024    3) Pt will increase  strength as evidenced by 5# increase on dynamometer bilaterally R: 46.3#  L: 63.3# progressing 3/5/2024    4) Pt to be able to wash his back in the shower independently with adaptive equipment as needed  progressing 3/5/2024    5) Pts parent to report increased independence with oral hygiene including appropriate amount of toothpaste for task  progressing 3/5/2024    6) Pt will tie his shoes with minimal verbal cues   progressing 3/5/2024    7) Pt will prepare a cold meal from start to finish using appropriately portioned ingredients with minimal verbal cues   progressing 3/5/2024        LongTerm Goals (12 weeks) Status When Last Assessed  Progressing/ Met   1) Pt will increase his Box and Blocks score as evidenced by 10 block increase bilaterally to  increase gross motor coordination R: 26  L: 21 progressing 3/5/2024    2) Pt will increase fine motor coordination as evidenced by completion of 9 Hole Peg test in 40 seconds or less bilaterally   R: 54s  L: 53s progressing 3/5/2024    3) Pt will increase  strength as evidenced by 10# increase on dynamometer bilaterally R: 46.3#  L: 63.3# progressing 3/5/2024    4) Pt will use butter knife appropriately with no safety concerns during meal preparation 5/5 attempts  progressing 3/5/2024    5)  Pt will perform UBD / LBD independently, including buttons, fasteners, tying shoes, and with correct orientation of clothing items   progressing 3/5/2024    6) Pts parent will self report increased efficiency with toilet hygiene   progressing 3/5/2024     Additional functional goals to be added as patient progresses and per his priorities.    Plan   Certification Period/Plan of care expiration: 3/5/2024 to 5/28/2024.    Outpatient Occupational Therapy 2 times weekly for 12 weeks to include the following interventions: Patient Education, Self Care, Therapeutic Activities, and Therapeutic Exercise.    SALMA Kline  3/5/2024    I certify that I was present in the room directing the student in service delivery and guiding them using my skilled judgment. As the co-signing therapist I have reviewed the students documentation and am responsible for the treatment, assessment, and plan.      Marcelle Sommer MS, OTR/L     Physician's Signature: _________________________________________ Date: ________________

## 2024-03-08 NOTE — PROGRESS NOTES
[facility] OUTPATIENT THERAPY AND WELLNESS   Occupational Therapy Initial Neurological Evaluation       Allyson Estrada   MRN: [id number]     Occupational therapy evaluation  complete. Please see attached POC for details. Thank you for consult!    Marcelle Sommer MS, OTR/L   3/5/2024

## 2024-03-11 ENCOUNTER — CLINICAL SUPPORT (OUTPATIENT)
Dept: REHABILITATION | Facility: HOSPITAL | Age: 19
End: 2024-03-11
Payer: COMMERCIAL

## 2024-03-11 DIAGNOSIS — R47.1 DYSARTHRIA: Primary | ICD-10-CM

## 2024-03-11 PROCEDURE — 92507 TX SP LANG VOICE COMM INDIV: CPT | Mod: PO

## 2024-03-11 NOTE — PROGRESS NOTES
[facility]  Speech Therapy Treatment Note- Neurological Rehabilitation  Date: 3/11/2024     Name: Allyson Estrada   MRN: [id number]   Therapy Diagnosis:   Encounter Diagnosis   Name Primary?    Dysarthria Yes   Physician: Jude Méndez MD  Physician Orders: Ambulatory Referral to Speech Therapy   Medical Diagnosis: Anoxic brain injury [G93.1]     Visit #/ Visits Authorized: 2/ 20  Date of Evaluation:  2/27/2024  Insurance Authorization Period: 2/28/2024 - 12/31/2024   Plan of Care Expiration Date:    4/23/2024  Extended Plan of Care:  not applicable    Progress Note: due 3/27/2024     Time In:  1655  Time Out:  1735  Total Billable Time: 40     Precautions: Standard and Communication  Subjective:   Patient reports: No major changes from previous session  He was compliant to home exercise program.   Response to previous treatment: good  Pain Scale: no pain indicated throughout session  Objective:         UNTIMED  Speech- Language- Voice Therapy       Short Term Goals: (6 weeks) Current Progress:   Patient will complete RBANS assessment to determine further speech therapy Plan of Care See results below    Goal Met 3/4/2024     2. Patient will recall 4/4 clear speech strategies with minimal assist      Progressing/ Not Met 3/11/2024    Worked on loud speech today as the main strategy address   3. Patient will read aloud low level passage with independently use of clear speech strategies with 80% accuracy independently.       Progressing/ Not Met 3/11/2024    Not addressed in today's session.    4. Patient will complete diaphragmatic breathing exercises by producing maximal exhalation via diaphragmic breathing with a duration of 5 seconds in 10 trials, consistently with minimal tactile and verbal clinician cues.       Progressing/ Not Met 3/11/2024    Unable to produce a breath longer than 2 seconds.    5. Patient will complete ENT assessment for clearance of PhoRTE exercises in speech therapy.        Progressing/ Not Met 3/11/2024    Re-inforced to mom the importance of scheduling with Otolaryngology (ENT)    6. Patient will name objects with 90% accuracy independently.     Progressing/Not Met 3/11/2024  Patient named objects with 85% accuracy independently, 100% given phonemic cues   7. Patient will state the function of objects using 3+ word sentences with minimal cues.     Progressing/Not Met 3/11/2024  Completed using moderate cues      Patient Education/Response:   Patient educated regarding the followin. Using his loud voice  2. Scheduling for Otolaryngology (ENT)     Home program established: yes-use of loud voice  Patient verbalized understanding to all above education provided.     See Electronic Medical Record under Patient Instructions for exercises provided throughout therapy.  Assessment:   Patient with needing significant cues throughout the session for use of loud voice. Query if an EMST would be beneficial for this patient; however, needs full Otolaryngology (ENT) assessment.     Emerson is progressing well towards his goals. Current goals remain appropriate. Goals to be updated as necessary.     Patient prognosis is Good. Patient will continue to benefit from skilled outpatient speech and language therapy to address the deficits listed in the problem list on initial evaluation, provide patient/family education and to maximize patient's level of independence in the home and community environment.   Medical necessity is demonstrated by the following IMPAIRMENTS:  Aphasia: decreased content words and significant word finding difficulty in all situations severely limiting functional communication with both familiar and unfamiliar communication partners to relay medically and safety relevant information in a timely manner in a state of emergency.    Motor speech: Decreased speech intelligibility results in decreased efficiency communicating medical and social wants and needs in the case of  emergency.    Barriers to Therapy: none identified   Patient's spiritual, cultural and educational needs considered and patient agreeable to plan of care and goals.  Plan:   Continue Plan of Care with focus on rehabilitation and compensation for cognitive communication, dysarthria, and expressive receptive language disorder.    Dayanna Rangel CCC-SLP   3/11/2024

## 2024-03-11 NOTE — PROGRESS NOTES
"  [facility]   Physical Therapy Treatment Note      Name: Allyson Estrada  Clinic Number: [id number]      Therapy Diagnosis:   Encounter Diagnosis   Name Primary?    Impaired gross motor coordination Yes       Physician: Jude Méndez MD    Visit Date: 3/12/2024    Physician Orders: PT Eval and Treat Neuro Program   Medical Diagnosis from Referral: Anoxic brain injury [G93.1]   Evaluation Date: 2/26/2024  Authorization Period Expiration: 2/18/2025  Plan of Care Expiration: 4/22/24  Progress Note Due: 3/25/24  Date of Surgery: NA  Visit # / Visits authorized: 4/ 20 (5)   FOTO: 1/ 3     Time In: 4:55 pm   Time Out: 5:30 pm     Total Billable Time: 35 minutes    Precautions: Standard, Fall, and developmental delay     PTA visit 0/5      Subjective     Pt reports: " I am doing good."    He was not compliant with home exercise program.     Response to previous treatment: good  Functional change: ongoing    Pain: 0/10    Location: NA      Objective      Objective Measures updated at progress report unless specified.     Treatment     Emerson received the treatments listed below:      Emerson received therapeutic exercises to develop strength, endurance, and ROM for 13 minutes including:      - Sci fit level 4 bilateral upper extremity/ lower extremity 10 minutes.   - standing calf stretch on foam half roll 30 sec x 3 trials each lower extremity        (Therapeutic rest breaks throughout as needed).     Emerson participated in neuromuscular re-education activities to improve: Balance and Coordination for 22 minutes. The following activities were included:    In parallel bars:   -  walking tandem in parallel bars x 5 laps occasional 1 upper extremity support on bars.   - single leg stance 4-7 seconds at best each lower extremity x 10 trials each.   - step ups on 6 inch step 2x10 with each lower extremity leading with 1 upper extremity support needed for balance.   - alternating toe taps on 6 inch step " "2x10 each lower extremity occasional 1 upper extremity support for balance  - lateral toe taps on 6 inch step 2x10 each lower extremity occasional 1 upper extremity support for balance.   - kareoke left and right occasional 1 upper extremity support. Verbal cues for sequencing.      (Therapeutic rest breaks throughout as needed).       Pt ambulated 120 ft x 2 trials supervision. Noted toe walking throughout.       Home Exercises Provided and Patient Education Provided     Education provided:   - educated about exercises and correct form. Reports understanding.     Written Home Exercises Provided: In previous session.   Exercises were reviewed.  Emerson demonstrated fair  understanding of the education provided.     See EMR under Patient Instructions for exercises provided 3/1/2024.    Assessment     Pt tolerated session well. Working on dynamic standing balance in therapy. Pt does require 1 upper extremity support on bars frequently for balance . Some difficulty with sequencing with "kareoke" drill left and right but able to complete with cues. Pt remains motivated to improve and a good candidate for PT.     Emesron Is progressing well towards his goals.   Pt prognosis is Good.     Pt will continue to benefit from skilled outpatient physical therapy to address the deficits listed in the problem list box on initial evaluation, provide pt/family education and to maximize pt's level of independence in the home and community environment.     Pt's spiritual, cultural and educational needs considered and pt agreeable to plan of care and goals.     Anticipated barriers to physical therapy: none at this time.     Goals: Goals:  Short Term Goals: (4 weeks) Date established: Status:    1. Patient will be provided with an individualized home exercise program.  2/26/24 MET   2.  Patient will demonstrate improve endurance and activity tolerance as evidenced by ability to perform Nu-step x 15 minutes without rests breaks with heart " rate post-activity equivalent to 50-80% of maximum heart rate.  2/26/24 ongoing         Long Term Goals: (8 weeks) Date established: Status:    1. Patient will be independent and compliant with updated home exercise program. 2/26/24    ongoing   2.  Patient will increase his gait speed as assessed by the Timed 10 Meter Walk Test from 1.2 m/s to >1.4 m/s to increase his safety and independence with gait at a community level. (Minimal Clinically Important Difference for older adults = 0.13 m/s)    2/26/24    ongoing      3. Patient will improve his score on the Functional Gait Assessment (FGA) from 19/30 to at least 23/30, indicating improved safety and (I) with dynamic, community level gait. (Minimal Detectable Change for older adults= 4 points)  2/26/24 ongoing   4. Patient will maintain bilateral single leg stance for at least 15 seconds to improve standing balance and overall safety with functional tasks.  2/26/24 ongoing   5. Patient will perform 5 sit to stands in < 10 seconds to demonstrate a reduction in fall risk.  2/26/24 ongoing   6. Patient will begin some form of community fitness to begin regular and consistent performance of exercise to continue maintenance of gains made in physical therapy.  2/26 24 ongoing        Plan   Plan of care Certification: 2/26/2024 to 4/22/24.     Outpatient Physical Therapy 2 times weekly for 8 weeks to include the following interventions: Gait Training, Neuromuscular Re-ed, Patient Education, Therapeutic Activities, and Therapeutic Exercise.     Recommendations for next treatment session:       Balance exercises    Estela Ann, PT

## 2024-03-12 ENCOUNTER — CLINICAL SUPPORT (OUTPATIENT)
Dept: REHABILITATION | Facility: HOSPITAL | Age: 19
End: 2024-03-12
Payer: COMMERCIAL

## 2024-03-12 DIAGNOSIS — R27.8 IMPAIRED GROSS MOTOR COORDINATION: Primary | ICD-10-CM

## 2024-03-12 DIAGNOSIS — R27.8 DECREASED COORDINATION: ICD-10-CM

## 2024-03-12 DIAGNOSIS — Z78.9 DEFICITS IN ACTIVITIES OF DAILY LIVING: ICD-10-CM

## 2024-03-12 DIAGNOSIS — R53.1 DECREASED STRENGTH: Primary | ICD-10-CM

## 2024-03-12 PROCEDURE — 97530 THERAPEUTIC ACTIVITIES: CPT | Mod: PO

## 2024-03-12 PROCEDURE — 97110 THERAPEUTIC EXERCISES: CPT | Mod: PO

## 2024-03-12 PROCEDURE — 97112 NEUROMUSCULAR REEDUCATION: CPT | Mod: PO

## 2024-03-13 NOTE — PROGRESS NOTES
"  [facility]   Physical Therapy Treatment Note      Name: Allyson Estrada  Clinic Number: [id number]      Therapy Diagnosis:   Encounter Diagnosis   Name Primary?    Impaired gross motor coordination Yes           Physician: Jude Méndez MD    Visit Date: 3/14/2024    Physician Orders: PT Eval and Treat Neuro Program   Medical Diagnosis from Referral: Anoxic brain injury [G93.1]   Evaluation Date: 2/26/2024  Authorization Period Expiration: 2/18/2025  Plan of Care Expiration: 4/22/24  Progress Note Due: 3/25/24  Date of Surgery: NA  Visit # / Visits authorized: 5/ 20 (6)   FOTO: 1/ 3     Time In: 1602  Time Out: 1642  Total Billable Time: 40 minutes    Precautions: Standard, Fall, and developmental delay     PTA visit 0/5      Subjective     Pt reports: "I'm good."     He was not compliant with home exercise program.     Response to previous treatment: good  Functional change: ongoing    Pain: 0/10  Location: NA      Objective      Objective Measures updated at progress report unless specified.     Treatment     Emerson received the treatments listed below:      Emerson received therapeutic exercises to develop strength, endurance, and ROM for 20 minutes including:    - Sci Fit Level 4 x10 minutes with bilateral upper and lower extremities  - x15 sit <> stand with ball toss/ catch   - x10 sit <> stand on Air Ex with single upper extremity support      (Therapeutic rest breaks throughout as needed).     Emerson participated in neuromuscular re-education activities to improve: Balance and Coordination for 10 minutes. The following activities were included:    In parallel bars:   - x4 laps forward stepping over 4 hurdles to with reciprocal pattern, no upper extremity support   - x4 laps side stepping over 4 hurdles to with single upper extremity support  - 3x30" right and left single leg stance with single upper extremity support as needed   - 3x10 heel raise/ toe raise with single upper extremity " support      therapeutic activities to improve functional performance for 10  minutes, including:    - 3x4 stairs without upper extremity support on ascent and with single upper extremity support on descent. Contact guard assist and increased time.   - Pt ambulated 630 feet over even and uneven surfaces outdoors including 4x50 feet over ramp with supervision.   - Pt ambulated 120 ft x 2 trials supervision. Noted toe walking throughout.       Home Exercises Provided and Patient Education Provided     Education provided:   - Proper form for all exercises included in today's session.      Written Home Exercises Provided: In previous session.   Exercises were reviewed.  Emerson demonstrated fair  understanding of the education provided.     See EMR under Patient Instructions for exercises provided 3/1/2024.    Assessment     Emerson tolerated session well with focus on bilateral lower extremity strength, balance and ambulation on uneven surfaces. He demonstrated improvements in single leg balance today with ability to maintain standing for longer duration. He required contact guard assistance and increased time when ascending and descending stairs. He safely ambulated over uneven surfaces and ramp outdoors. Emerson remains motivated to improve and a good candidate for PT.    Emerson Is progressing well towards his goals.   Pt prognosis is Good.     Pt will continue to benefit from skilled outpatient physical therapy to address the deficits listed in the problem list box on initial evaluation, provide pt/family education and to maximize pt's level of independence in the home and community environment.     Pt's spiritual, cultural and educational needs considered and pt agreeable to plan of care and goals.     Anticipated barriers to physical therapy: none at this time.     Goals: Goals:  Short Term Goals: (4 weeks) Date established: Status:    1. Patient will be provided with an individualized home exercise program.  2/26/24 MET    2.  Patient will demonstrate improve endurance and activity tolerance as evidenced by ability to perform Nu-step x 15 minutes without rests breaks with heart rate post-activity equivalent to 50-80% of maximum heart rate.  2/26/24 ongoing         Long Term Goals: (8 weeks) Date established: Status:    1. Patient will be independent and compliant with updated home exercise program. 2/26/24    ongoing   2.  Patient will increase his gait speed as assessed by the Timed 10 Meter Walk Test from 1.2 m/s to >1.4 m/s to increase his safety and independence with gait at a community level. (Minimal Clinically Important Difference for older adults = 0.13 m/s)    2/26/24    ongoing      3. Patient will improve his score on the Functional Gait Assessment (FGA) from 19/30 to at least 23/30, indicating improved safety and (I) with dynamic, community level gait. (Minimal Detectable Change for older adults= 4 points)  2/26/24 ongoing   4. Patient will maintain bilateral single leg stance for at least 15 seconds to improve standing balance and overall safety with functional tasks.  2/26/24 ongoing   5. Patient will perform 5 sit to stands in < 10 seconds to demonstrate a reduction in fall risk.  2/26/24 ongoing   6. Patient will begin some form of community fitness to begin regular and consistent performance of exercise to continue maintenance of gains made in physical therapy.  2/26 24 ongoing        Plan   Plan of care Certification: 2/26/2024 to 4/22/24.     Outpatient Physical Therapy 2 times weekly for 8 weeks to include the following interventions: Gait Training, Neuromuscular Re-ed, Patient Education, Therapeutic Activities, and Therapeutic Exercise.     Recommendations for next treatment session:     Balance exercises    Brianna Lindsey, PT

## 2024-03-14 ENCOUNTER — CLINICAL SUPPORT (OUTPATIENT)
Dept: REHABILITATION | Facility: HOSPITAL | Age: 19
End: 2024-03-14
Payer: COMMERCIAL

## 2024-03-14 DIAGNOSIS — Z78.9 DEFICITS IN ACTIVITIES OF DAILY LIVING: ICD-10-CM

## 2024-03-14 DIAGNOSIS — R27.8 DECREASED COORDINATION: ICD-10-CM

## 2024-03-14 DIAGNOSIS — R53.1 DECREASED STRENGTH: Primary | ICD-10-CM

## 2024-03-14 DIAGNOSIS — R27.8 IMPAIRED GROSS MOTOR COORDINATION: Primary | ICD-10-CM

## 2024-03-14 PROCEDURE — 97530 THERAPEUTIC ACTIVITIES: CPT | Mod: PO

## 2024-03-14 PROCEDURE — 97110 THERAPEUTIC EXERCISES: CPT | Mod: PO

## 2024-03-14 PROCEDURE — 97112 NEUROMUSCULAR REEDUCATION: CPT | Mod: PO

## 2024-03-14 NOTE — PROGRESS NOTES
"[facility]   Occupational Therapy Treatment Note     Name: Allyson Estarda  [facility] Number: [id number]    Therapy Diagnosis:   Encounter Diagnoses   Name Primary?    Decreased strength Yes    Deficits in activities of daily living     Decreased coordination      Physician: Jude Méndez MD    Physician Orders: Eval and Treat  Medical Diagnosis: G93.1 (ICD-10-CM) - Anoxic brain injury   Evaluation Date: 3/14/2024  Insurance Authorization Period Expiration:   Plan of Care Certification Period: 5/28/2024   Progress note due: 4/5/2024, 5/3/2024  Date of Return to MD: To be determined  Visit # / Visits authorized: 3/20 (+eval)  FOTO: 1/ 3, last assessed 3/8/2024    Precautions:  Standard    Time In: 4:40 PM   Time Out: 5:30 PM   Total Billable Time: 40 minutes    Subjective     Pt reports: pt reports he worked hard in physical therapy.     he was compliant with home exercise program given last session, (not given).   Response to previous treatment: no concerns  Functional change: no concerns    Pain: 0/10  Location: N/a    Objective     At 4:52 PM UG=299/80, HR=96, SpO2=99%.    Treatment     Emerson received the treatments listed below:     Therapeutic exercises to develop strength and endurance for 12 minutes, including:    -UBE x5 min forward/ 5 min back w/ the bilateral UE, level 2.5 standing to increase activity tolerance, strength and coordination for reciprocal movements in the bilateral UE. West avg=22, peak west=32 . Patient rated RPE as "medium". Originally on level 2.2, but he reported it was too easy.     Therapeutic activities to improve functional performance for 28 minutes, including:    See vitals above     Pt practiced shoe tying. Originally practicing on tie board and moving to tying his own shoe. To begin, pt only practiced the first knot (x, under, pull). He then attempted bunny ears.Pt required max verbal cues and max a for completion of task. Required significantly increased " time     Meditation balls for increased in hand manipulation. He attempted with both hands for several minutes with 3-4 rotations noted with each hand. Required increased time.     Neuromuscular re-education activities to improve Balance and Coordination for 0 minutes. The following activities were included:  N/a    Home Exercises and Education Provided     Education provided:   - Handwriting exercises at home   - Progress towards goals     Written Home Exercises Provided: yes.  Exercises were reviewed and Emerson was able to demonstrate them prior to the end of the session.  Emreson demonstrated good  understanding of the HEP provided.     See EMR under Patient Instructions for exercises provided 3/8/2024.     Assessment     Emerson tolerated his session very well today. Increased west average and peak west during UBE He continues to have difficulty with sequencing and steps of shoe tying. In hand manipulation difficult bilaterally.     Emerson's rehab potential is Good.     Anticipated barriers to occupational therapy: Potentially transportation as patient does not drive.    Patient's spiritual, cultural and educational needs considered and patient is agreeable to the plan of care and goals as stated below:     Goals:  Short Term Goals:    Short Term Goals (6 weeks) Status When Last Assessed  Progressing/ Met   1) Pt will complete HEP with minimal verbal cues from parent  progressing 3/14/2024    2) Handwriting to be assessed and goal added Completed 3/8/2024. See goal below.  Completed 3/8/2024    3) Pt will increase  strength as evidenced by 5# increase on dynamometer bilaterally R: 46.3#  L: 63.3# progressing 3/14/2024    4) Pt to be able to wash his back in the shower independently with adaptive equipment as needed  progressing 3/14/2024    5) Pts parent to report increased independence with oral hygiene including appropriate amount of toothpaste for task  progressing 3/14/2024    6) Pt will tie his shoes with  minimal verbal cues   progressing 3/14/2024    7) Pt will prepare a cold meal from start to finish using appropriately portioned ingredients with minimal verbal cues   progressing 3/14/2024    8) New goal 3/8/2024: Patient will be able to copy 2 sentences from a book without skipping words with 80% legibility.   progressing 3/14/2024         LongTerm Goals (12 weeks) Status When Last Assessed  Progressing/ Met   1) Pt will increase his Box and Blocks score as evidenced by 10 block increase bilaterally to increase gross motor coordination R: 26  L: 21 progressing 3/14/2024    2) Pt will increase fine motor coordination as evidenced by completion of 9 Hole Peg test in 40 seconds or less bilaterally   R: 54s  L: 53s progressing 3/14/2024    3) Pt will increase  strength as evidenced by 10# increase on dynamometer bilaterally R: 46.3#  L: 63.3# progressing 3/14/2024    4) Pt will use butter knife appropriately with no safety concerns during meal preparation 5/5 attempts  progressing 3/14/2024    5)  Pt will perform UBD / LBD independently, including buttons, fasteners, tying shoes, and with correct orientation of clothing items   progressing 3/14/2024    6) Pts parent will self report increased efficiency with toilet hygiene   progressing 3/14/2024     Additional functional goals to be added as patient progresses and per his priorities.    Plan   Certification Period/Plan of care expiration: 3/14/2024 to 5/28/2024.    Outpatient Occupational Therapy 2 times weekly for 12 weeks to include the following interventions: Patient Education, Self Care, Therapeutic Activities, and Therapeutic Exercise.    Updates/Grading for next session: continue handwriting, upgrade UBE, in-hand manipulation,  strength, shoe tying     Marcelle Sommer OT   3/14/2024

## 2024-03-18 ENCOUNTER — CLINICAL SUPPORT (OUTPATIENT)
Dept: REHABILITATION | Facility: HOSPITAL | Age: 19
End: 2024-03-18
Payer: COMMERCIAL

## 2024-03-18 DIAGNOSIS — R47.1 DYSARTHRIA: Primary | ICD-10-CM

## 2024-03-18 PROCEDURE — 92507 TX SP LANG VOICE COMM INDIV: CPT | Mod: PO

## 2024-03-18 NOTE — PROGRESS NOTES
[facility]  Speech Therapy Treatment Note- Neurological Rehabilitation  Date: 3/18/2024     Name: Allyson Estrada   MRN: [id number]   Therapy Diagnosis:   Encounter Diagnosis   Name Primary?    Dysarthria Yes   Physician: Jude Méndez MD  Physician Orders: Ambulatory Referral to Speech Therapy   Medical Diagnosis: Anoxic brain injury [G93.1]     Visit #/ Visits Authorized: 3/ 20  Date of Evaluation:  2/27/2024  Insurance Authorization Period: 2/28/2024 - 12/31/2024   Plan of Care Expiration Date:    4/23/2024  Extended Plan of Care:  not applicable    Progress Note: due 3/27/2024     Time In:  1703  Time Out:  1740  Total Billable Time: 37     Precautions: Standard and Communication  Subjective:   Patient reports: No major changes from previous session  He was compliant to home exercise program.   Response to previous treatment: good  Pain Scale: no pain indicated throughout session  Objective:         UNTIMED  Speech- Language- Voice Therapy       Short Term Goals: (6 weeks) Current Progress:   Patient will complete RBANS assessment to determine further speech therapy Plan of Care See results below    Goal Met 3/4/2024     2. Patient will recall 4/4 clear speech strategies with minimal assist      Progressing/ Not Met 3/18/2024    Worked on loud speech today as the main strategy address (continued)   3. Patient will read aloud low level passage with independently use of clear speech strategies with 80% accuracy independently.       Progressing/ Not Met 3/18/2024    Not addressed in today's session.    4. Patient will complete diaphragmatic breathing exercises by producing maximal exhalation via diaphragmic breathing with a duration of 5 seconds in 10 trials, consistently with minimal tactile and verbal clinician cues.       Progressing/ Not Met 3/18/2024    Unable to produce a breath longer than 3 seconds.    5. Patient will complete ENT assessment for clearance of PhoRTE exercises in speech  therapy.       Progressing/ Not Met 3/18/2024    Re-inforced to mom the importance of scheduling with Otolaryngology (ENT)    6. Patient will name objects with 90% accuracy independently.     Progressing/Not Met 3/18/2024  Patient named objects x50 when given 2D color picture with 85% accuracy independently, 100% given phonemic cues    Addressed final consonant deletion throughout as appropriate for intelligibility    7. Patient will state the function of objects using 3+ word sentences with minimal cues.     Progressing/Not Met 3/18/2024  Completed using moderate cues during naming task average 2 words and need for semantic cues      Patient Education/Response:   Patient educated regarding the followin. Using his loud voice when communicating   2. Scheduling for Otolaryngology (ENT)     Home program established: yes-use of loud voice  Patient verbalized understanding to all above education provided.     See Electronic Medical Record under Patient Instructions for exercises provided throughout therapy.  Assessment:   Patient with needing significant cues throughout the session for use of loud voice. Query if an EMST would be beneficial for this patient; however, needs full Otolaryngology (ENT) assessment. Addressed final consonant deletion throughout as appropriate for intelligibility; though partially may be dialectical and partially breath support related.     Emerson is progressing well towards his goals. Current goals remain appropriate. Goals to be updated as necessary.     Patient prognosis is Good. Patient will continue to benefit from skilled outpatient speech and language therapy to address the deficits listed in the problem list on initial evaluation, provide patient/family education and to maximize patient's level of independence in the home and community environment.   Medical necessity is demonstrated by the following IMPAIRMENTS:  Aphasia: decreased content words and significant word finding  difficulty in all situations severely limiting functional communication with both familiar and unfamiliar communication partners to relay medically and safety relevant information in a timely manner in a state of emergency.    Motor speech: Decreased speech intelligibility results in decreased efficiency communicating medical and social wants and needs in the case of emergency.    Barriers to Therapy: none identified   Patient's spiritual, cultural and educational needs considered and patient agreeable to plan of care and goals.  Plan:   Continue Plan of Care with focus on rehabilitation and compensation for cognitive communication, dysarthria, and expressive receptive language disorder.    Dayanna Rangel CCC-SLP   3/18/2024

## 2024-03-19 ENCOUNTER — CLINICAL SUPPORT (OUTPATIENT)
Dept: REHABILITATION | Facility: HOSPITAL | Age: 19
End: 2024-03-19
Payer: COMMERCIAL

## 2024-03-19 DIAGNOSIS — R27.8 IMPAIRED GROSS MOTOR COORDINATION: Primary | ICD-10-CM

## 2024-03-19 DIAGNOSIS — R27.8 DECREASED COORDINATION: ICD-10-CM

## 2024-03-19 DIAGNOSIS — Z78.9 DEFICITS IN ACTIVITIES OF DAILY LIVING: ICD-10-CM

## 2024-03-19 DIAGNOSIS — R53.1 DECREASED STRENGTH: Primary | ICD-10-CM

## 2024-03-19 PROCEDURE — 97530 THERAPEUTIC ACTIVITIES: CPT | Mod: PO

## 2024-03-19 PROCEDURE — 97112 NEUROMUSCULAR REEDUCATION: CPT | Mod: PO

## 2024-03-19 PROCEDURE — 97110 THERAPEUTIC EXERCISES: CPT | Mod: PO

## 2024-03-19 NOTE — PROGRESS NOTES
"  [facility]   Physical Therapy Treatment Note      Name: Allyson Estrada  Clinic Number: [id number]      Therapy Diagnosis:   Encounter Diagnosis   Name Primary?    Impaired gross motor coordination Yes       Physician: Jude Méndez MD    Visit Date: 3/19/2024    Physician Orders: PT Eval and Treat Neuro Program   Medical Diagnosis from Referral: Anoxic brain injury [G93.1]   Evaluation Date: 2/26/2024  Authorization Period Expiration: 2/18/2025  Plan of Care Expiration: 4/22/24  Progress Note Due: 3/25/24  Date of Surgery: NA  Visit # / Visits authorized: 6/ 20 (7)   FOTO: 1/ 3     Time In: 1555   Time Out: 1640  Total Billable Time: 45 minutes    Precautions: Standard, Fall, and developmental delay     PTA visit 0/5      Subjective     Pt reports: "I'm good."  Pt reports he did push ups at home.     He was not compliant with home exercise program.     Response to previous treatment: good  Functional change: ongoing    Pain: Not rated  Location: Reports right ankle "just started hurting" with walking       Objective      Objective Measures updated at progress report unless specified.     Treatment     Emerson received the treatments listed below:      Emerson participated in neuromuscular re-education activities to improve: Balance and Coordination for 35 minutes. The following activities were included:    - Treadmill walking x 10 minutes at 5% incline and 1.2-1.4 mph while in harness and with bilateral upper extremity support. Pt demonstrated improved heel strike with incline walking.     - x10 sit <> stand on Air Ex without upper extremity support   - x60" anterior/posterior weight shifting on AIrEx with light upper extremity support    - Sci Fit upright bike x5 minutes (seat height 4, seat forward/back 5) with bilateral upper extremity support on handles and verbal cues to continue revolutions     therapeutic activities to improve functional performance for 10  minutes, including:    - " "3x30" right and left standing gastroc stretch on half foam roll   - 4x4 stairs without single upper extremity support and reciprocal stepping pattern. Stand-by assist.   - 2x4 stairs without upper extremity support on ascent, single upper extremity support on descent. Contact guard assist. Increased time.       Home Exercises Provided and Patient Education Provided     Education provided:     - Proper form for all exercises included in today's session.      Written Home Exercises Provided: In previous session.   Exercises were reviewed.  Emerson demonstrated fair  understanding of the education provided.     See EMR under Patient Instructions for exercises provided 3/1/2024.    Assessment     Emerson tolerated session well with focus on neuromuscular re-education. Introduced Emerson to treadmill walking to improve heel strike during ambulation and upright bike to work toward patient's goal of riding a bike. Incorporated ankle stretches today due to reports of ankle discomfort during session. Emerson demonstrated improvements navigating stairs but continues to require increased time. He continues to demonstrate improvements in therapy and is motivated to participate.     Emerson Is progressing well towards his goals.   Pt prognosis is Good.     Pt will continue to benefit from skilled outpatient physical therapy to address the deficits listed in the problem list box on initial evaluation, provide pt/family education and to maximize pt's level of independence in the home and community environment.     Pt's spiritual, cultural and educational needs considered and pt agreeable to plan of care and goals.     Anticipated barriers to physical therapy: none at this time.     Goals: Goals:  Short Term Goals: (4 weeks) Date established: Status:    1. Patient will be provided with an individualized home exercise program.  2/26/24 MET   2.  Patient will demonstrate improve endurance and activity tolerance as evidenced by ability to perform " Nu-step x 15 minutes without rests breaks with heart rate post-activity equivalent to 50-80% of maximum heart rate.  2/26/24 ongoing         Long Term Goals: (8 weeks) Date established: Status:    1. Patient will be independent and compliant with updated home exercise program. 2/26/24    ongoing   2.  Patient will increase his gait speed as assessed by the Timed 10 Meter Walk Test from 1.2 m/s to >1.4 m/s to increase his safety and independence with gait at a community level. (Minimal Clinically Important Difference for older adults = 0.13 m/s)    2/26/24    ongoing      3. Patient will improve his score on the Functional Gait Assessment (FGA) from 19/30 to at least 23/30, indicating improved safety and (I) with dynamic, community level gait. (Minimal Detectable Change for older adults= 4 points)  2/26/24 ongoing   4. Patient will maintain bilateral single leg stance for at least 15 seconds to improve standing balance and overall safety with functional tasks.  2/26/24 ongoing   5. Patient will perform 5 sit to stands in < 10 seconds to demonstrate a reduction in fall risk.  2/26/24 ongoing   6. Patient will begin some form of community fitness to begin regular and consistent performance of exercise to continue maintenance of gains made in physical therapy.  2/26 24 ongoing        Plan   Plan of care Certification: 2/26/2024 to 4/22/24.     Outpatient Physical Therapy 2 times weekly for 8 weeks to include the following interventions: Gait Training, Neuromuscular Re-ed, Patient Education, Therapeutic Activities, and Therapeutic Exercise.     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      Estela Ann, PT

## 2024-03-19 NOTE — PROGRESS NOTES
"[facility]   Occupational Therapy Treatment Note     Name: Allyson Estrada  [facility] Number: [id number]    Therapy Diagnosis:   Encounter Diagnoses   Name Primary?    Decreased strength Yes    Deficits in activities of daily living     Decreased coordination      Physician: Jude Méndez MD    Physician Orders: Eval and Treat  Medical Diagnosis: G93.1 (ICD-10-CM) - Anoxic brain injury   Evaluation Date: 3/19/2024  Insurance Authorization Period Expiration:   Plan of Care Certification Period: 5/28/2024   Progress note due: 4/5/2024, 5/3/2024  Date of Return to MD: To be determined  Visit # / Visits authorized: 4/20 (+eval)  FOTO: 1/ 3, last assessed 3/8/2024    Precautions:  Standard    Time In: 4:46 PM   Time Out: 5:34 PM   Total Billable Time: 48 minutes    Subjective     Pt reports: pt reports doing well today.    he was compliant with home exercise program given last session, (not given).   Response to previous treatment: no concerns  Functional change: no concerns    Pain: 0/10  Location: N/a    Objective     At 4:48 PM DW=793/81, HR=68, SpO2=95%.    Treatment     Emerson received the treatments listed below:     Therapeutic exercises to develop strength and endurance for 11 minutes, including:    -UBE x5 min forward/ 5 min back w/ the bilateral UE, level 2.8 standing to increase activity tolerance, strength and coordination for reciprocal movements in the bilateral UE. West avg=18, peak west=26 . Patient rated RPE as "medium". Resistance increased due to patient report that it was still easy.     Therapeutic activities to improve functional performance for 37 minutes, including:    See vitals above     Pt participated in a cutting activity using a knife and fork. Blue thera putty was used to make a "pancake" and a "hot dog." Pt was educated on knife safety + proper holding technique, and was given a demonstration. He was able to cut "bite sized" pieces of each item with mod verbal cues " + tactile cues for safety to keep his fingers away from the blade, as well as to use a sawing motion to cut completely through the putty. Min a was given intermittently to adjust hand position. All cues progressively lessened throughout task. Upon activity completion, pt was able to verbalize importance of keeping knife pointed away from self, and keeping fingers off of the blade.  A piece of red foam was sent home with him to use as a built up handle for practice at home     OTS educated mom on session details and outlined homework to be completed prior to next session (supervised cutting of cheese or bread with butter knife). She v/u.     Neuromuscular re-education activities to improve Balance and Coordination for 0 minutes. The following activities were included:  N/a    Home Exercises and Education Provided     Education provided:   - Handwriting exercises at home   - Progress towards goals   - Cutting food and knife safety    Written Home Exercises Provided: yes.  Exercises were reviewed and Emerson was able to demonstrate them prior to the end of the session.  Emerson demonstrated good  understanding of the HEP provided.     See EMR under Patient Instructions for exercises provided 3/8/2024.     Assessment     Emerson tolerated his session very well today. UBE resistance increased to provide more appropriate challenge today. He was receptive to fork/knife cutting education and was able to verbalize knife safety. He progressively improved in his abilities to perform cutting activity throughout session.    Emerson's rehab potential is Good.     Anticipated barriers to occupational therapy: Potentially transportation as patient does not drive.    Patient's spiritual, cultural and educational needs considered and patient is agreeable to the plan of care and goals as stated below:     Goals:  Short Term Goals:    Short Term Goals (6 weeks) Status When Last Assessed  Progressing/ Met   1) Pt will complete HEP with minimal  verbal cues from parent  progressing 3/19/2024    2) Handwriting to be assessed and goal added Completed 3/8/2024. See goal below.  Completed 3/8/2024    3) Pt will increase  strength as evidenced by 5# increase on dynamometer bilaterally R: 46.3#  L: 63.3# progressing 3/19/2024    4) Pt to be able to wash his back in the shower independently with adaptive equipment as needed  progressing 3/19/2024    5) Pts parent to report increased independence with oral hygiene including appropriate amount of toothpaste for task  progressing 3/19/2024    6) Pt will tie his shoes with minimal verbal cues   progressing 3/19/2024    7) Pt will prepare a cold meal from start to finish using appropriately portioned ingredients with minimal verbal cues   progressing 3/19/2024    8) New goal 3/8/2024: Patient will be able to copy 2 sentences from a book without skipping words with 80% legibility.   progressing 3/19/2024         LongTerm Goals (12 weeks) Status When Last Assessed  Progressing/ Met   1) Pt will increase his Box and Blocks score as evidenced by 10 block increase bilaterally to increase gross motor coordination R: 26  L: 21 progressing 3/19/2024    2) Pt will increase fine motor coordination as evidenced by completion of 9 Hole Peg test in 40 seconds or less bilaterally   R: 54s  L: 53s progressing 3/19/2024    3) Pt will increase  strength as evidenced by 10# increase on dynamometer bilaterally R: 46.3#  L: 63.3# progressing 3/19/2024    4) Pt will use butter knife appropriately with no safety concerns during meal preparation 5/5 attempts  progressing 3/19/2024    5)  Pt will perform UBD / LBD independently, including buttons, fasteners, tying shoes, and with correct orientation of clothing items   progressing 3/19/2024    6) Pts parent will self report increased efficiency with toilet hygiene   progressing 3/19/2024     Additional functional goals to be added as patient progresses and per his priorities.    Plan    Certification Period/Plan of care expiration: 3/19/2024 to 5/28/2024.    Outpatient Occupational Therapy 2 times weekly for 12 weeks to include the following interventions: Patient Education, Self Care, Therapeutic Activities, and Therapeutic Exercise.    Updates/Grading for next session: continue handwriting, upgrade UBE, in-hand manipulation,  strength, shoe tying, portion sizes (toothpaste, condiments)     DANNIE Kline   3/19/2024    I certify that I was present in the room directing the student in service delivery and guiding them using my skilled judgment. As the co-signing therapist I have reviewed the students documentation and am responsible for the treatment, assessment, and plan.     Marcelle Sommer MS, OTR/L

## 2024-03-20 ENCOUNTER — CLINICAL SUPPORT (OUTPATIENT)
Dept: REHABILITATION | Facility: HOSPITAL | Age: 19
End: 2024-03-20
Payer: COMMERCIAL

## 2024-03-20 DIAGNOSIS — R47.1 DYSARTHRIA: Primary | ICD-10-CM

## 2024-03-20 PROCEDURE — 92507 TX SP LANG VOICE COMM INDIV: CPT | Mod: PO

## 2024-03-20 NOTE — PROGRESS NOTES
[facility]  Speech Therapy Treatment Note- Neurological Rehabilitation  Date: 3/20/2024     Name: Allyson Estrada   MRN: [id number]                                                                            Therapy Diagnosis:   Encounter Diagnosis   Name Primary?    Dysarthria Yes   Physician: Jude Méndez MD  Physician Orders: Ambulatory Referral to Speech Therapy   Medical Diagnosis: Anoxic brain injury [G93.1]     Visit #/ Visits Authorized: 4/ 20  Date of Evaluation:  2/27/2024  Insurance Authorization Period: 2/28/2024 - 12/31/2024   Plan of Care Expiration Date:    4/23/2024  Extended Plan of Care:  not applicable    Progress Note: due 3/27/2024     Time In:  1610  Time Out:  1650  Total Billable Time: 40     Precautions: Standard and Communication  Subjective:   Patient reports: No major changes from previous session, greeted Speech Language Pathologist with loud voice   He was compliant to home exercise program.   Response to previous treatment: good  Pain Scale: no pain indicated throughout session  Objective:         UNTIMED  Speech- Language- Voice Therapy       Short Term Goals: (6 weeks) Current Progress:   Patient will complete RBANS assessment to determine further speech therapy Plan of Care See results below    Goal Met 3/4/2024     2. Patient will recall 4/4 clear speech strategies with minimal assist      Progressing/ Not Met 3/20/2024    Worked on loud speech today as the main strategy  address (continued)    Patient greeted Speech Language Pathologist independently with loud voice.   3. Patient will read aloud low level passage with independently use of clear speech strategies with 80% accuracy independently.       Progressing/ Not Met 3/20/2024    Not addressed in today's session.    4. Patient will complete diaphragmatic breathing exercises by producing maximal exhalation via diaphragmic breathing with a duration of 5 seconds in 10 trials, consistently with minimal tactile and verbal clinician cues.       Progressing/ Not Met 3/20/2024    Unable to produce a breath longer than 3 seconds. Used a straw with a tissue for visual biofeedback    5. Patient will complete ENT assessment for clearance of PhoRTE exercises in speech therapy.       Progressing/ Not Met 3/20/2024    Re-inforced to mom the importance of scheduling with Otolaryngology (ENT)    6. Patient will name objects with 90% accuracy independently.     Progressing/Not Met 3/20/2024  Patient named objects x50 when given 2D color picture with 90% accuracy independently, 100% given phonemic cues    Addressed final consonant deletion throughout as appropriate for intelligibility     Patient name animals using loud voice with 15 animals generated given moderate cues for thought organization                                                                                                                                                                                                                                                                                                                                                                                                                                                                                                                                                                                                                                                                                                                                                                                                                                                                                                                                                                                                                                                                                                                                                                                                                                                                                                                                                                                                                                                                                                                                                                                                                                                                                                                                                                                                                                                                                                                                                                                                                                            Met x1   7. Patient will state the function of objects using 3+ word sentences with minimal cues.     Progressing/Not Met 3/20/2024  Completed using moderate cues during naming task average 2 words and need for semantic cues      Patient Education/Response:   Patient educated regarding the followin. Using his loud voice when communicating   2. Scheduling for Otolaryngology (ENT)     Home program established: yes-use of loud voice  Patient verbalized understanding to all above education provided.     See Electronic Medical Record under Patient Instructions for exercises provided throughout therapy.  Assessment:   Patient with needing  significant cues throughout the session for use of loud voice. Query if an EMST would be beneficial for this patient; however, needs full Otolaryngology (ENT) assessment. Addressed final consonant deletion throughout as appropriate for intelligibility; though partially may be dialectical and partially breath support related. Breath support addressed but continues to be limited at this time.     Emerson is progressing well towards his goals. Current goals remain appropriate. Goals to be updated as necessary.     Patient prognosis is Good. Patient will continue to benefit from skilled outpatient speech and language therapy to address the deficits listed in the problem list on initial evaluation, provide patient/family education and to maximize patient's level of independence in the home and community environment.   Medical necessity is demonstrated by the following IMPAIRMENTS:  Aphasia: decreased content words and significant word finding difficulty in all situations severely limiting functional communication with both familiar and unfamiliar communication partners to relay medically and safety relevant information in a timely manner in a state of emergency.    Motor speech: Decreased speech intelligibility results in decreased efficiency communicating medical and social wants and needs in the case of emergency.    Barriers to Therapy: none identified   Patient's spiritual, cultural and educational needs considered and patient agreeable to plan of care and goals.  Plan:   Continue Plan of Care with focus on rehabilitation and compensation for cognitive communication, dysarthria, and expressive receptive language disorder.    Dayanna Rangel CCC-SLP   3/20/2024

## 2024-03-21 ENCOUNTER — CLINICAL SUPPORT (OUTPATIENT)
Dept: REHABILITATION | Facility: HOSPITAL | Age: 19
End: 2024-03-21
Payer: COMMERCIAL

## 2024-03-21 DIAGNOSIS — R27.8 DECREASED COORDINATION: ICD-10-CM

## 2024-03-21 DIAGNOSIS — R27.8 IMPAIRED GROSS MOTOR COORDINATION: Primary | ICD-10-CM

## 2024-03-21 DIAGNOSIS — R53.1 DECREASED STRENGTH: Primary | ICD-10-CM

## 2024-03-21 DIAGNOSIS — Z78.9 DEFICITS IN ACTIVITIES OF DAILY LIVING: ICD-10-CM

## 2024-03-21 PROCEDURE — 97530 THERAPEUTIC ACTIVITIES: CPT | Mod: PO

## 2024-03-21 PROCEDURE — 97112 NEUROMUSCULAR REEDUCATION: CPT | Mod: PO

## 2024-03-21 PROCEDURE — 97110 THERAPEUTIC EXERCISES: CPT | Mod: PO

## 2024-03-21 NOTE — PROGRESS NOTES
"  [facility]   Physical Therapy Treatment Note      Name: Allyson Estrada  Clinic Number: [id number]      Therapy Diagnosis:   Encounter Diagnosis   Name Primary?    Impaired gross motor coordination Yes         Physician: Jude Méndez MD    Visit Date: 3/21/2024    Physician Orders: PT Eval and Treat Neuro Program   Medical Diagnosis from Referral: Anoxic brain injury [G93.1]   Evaluation Date: 2/26/2024  Authorization Period Expiration: 2/18/2025  Plan of Care Expiration: 4/22/24  Progress Note Due: 3/25/24  Date of Surgery: NA  Visit # / Visits authorized: 7/ 20 (8)  FOTO: 1/ 3     Time In: 1601  Time Out: 1645  Total Billable Time: 44 minutes    Precautions: Standard, Fall, and developmental delay     PTA visit 0/5      Subjective     Pt reports: "I'm good."  Reports ankle feels better.     He was not compliant with home exercise program.     Response to previous treatment: good  Functional change: ongoing    Pain: None   Location: NA      Objective      Objective Measures updated at progress report unless specified.     Treatment     Emerson received the treatments listed below:      Emerson participated in neuromuscular re-education activities to improve: Balance and Coordination for 15  minutes. The following activities were included:    - Treadmill walking x 10 minutes at 5% incline and 1.4 - 1.6 mph while in harness and with bilateral upper extremity support. PT providing occasional verbal cues for foot clearance which improved when walking at greater speed.     - x60" anterior/posterior weight shifting on AirEx with light upper extremity support    (Therapeutic rest breaks as needed)     therapeutic exercises to develop strength for 20  minutes including:    - Sci Fit Level 3 x 8 minutes with bilateral upper and lower extremities     - Shuttle Leg Press:    - 3x10 75 # bilateral lower extremity with focus on eccentric control    - 2x10 50 # single lower extremity     (Therapeutic " "rest breaks as needed)     therapeutic activities to improve functional performance for 10 minutes, including:    - 3x30" standing bilateral gastroc stretch on half foam roll   - 5x4 stairs without upper extremity support on ascent, single upper extremity support on descent. Stand-by assist  and increased time.     (Therapeutic rest breaks as needed)       Home Exercises Provided and Patient Education Provided     Education provided:     - Proper form for all exercises included in today's session.      Written Home Exercises Provided: In previous session.   Exercises were reviewed.  Emerson demonstrated fair  understanding of the education provided.     See EMR under Patient Instructions for exercises provided 3/1/2024.    Assessment     Emerson tolerated session well with focus on neuromuscular re-education. He demonstrated improvements in heel strike and foot clearance when walking at increased speed on treadmill.  Introduced patient to shuttle leg press today to improve eccentric quad control and stair navigation. He continues to demonstrate improvements in therapy and is motivated to participate.     Emerson Is progressing well towards his goals.   Pt prognosis is Good.     Pt will continue to benefit from skilled outpatient physical therapy to address the deficits listed in the problem list box on initial evaluation, provide pt/family education and to maximize pt's level of independence in the home and community environment.     Pt's spiritual, cultural and educational needs considered and pt agreeable to plan of care and goals.     Anticipated barriers to physical therapy: none at this time.     Goals: Goals:  Short Term Goals: (4 weeks) Date established: Status:    1. Patient will be provided with an individualized home exercise program.  2/26/24 MET   2.  Patient will demonstrate improve endurance and activity tolerance as evidenced by ability to perform Nu-step x 15 minutes without rests breaks with heart rate " post-activity equivalent to 50-80% of maximum heart rate.  2/26/24 ongoing         Long Term Goals: (8 weeks) Date established: Status:    1. Patient will be independent and compliant with updated home exercise program. 2/26/24    ongoing   2.  Patient will increase his gait speed as assessed by the Timed 10 Meter Walk Test from 1.2 m/s to >1.4 m/s to increase his safety and independence with gait at a community level. (Minimal Clinically Important Difference for older adults = 0.13 m/s)    2/26/24    ongoing      3. Patient will improve his score on the Functional Gait Assessment (FGA) from 19/30 to at least 23/30, indicating improved safety and (I) with dynamic, community level gait. (Minimal Detectable Change for older adults= 4 points)  2/26/24 ongoing   4. Patient will maintain bilateral single leg stance for at least 15 seconds to improve standing balance and overall safety with functional tasks.  2/26/24 ongoing   5. Patient will perform 5 sit to stands in < 10 seconds to demonstrate a reduction in fall risk.  2/26/24 ongoing   6. Patient will begin some form of community fitness to begin regular and consistent performance of exercise to continue maintenance of gains made in physical therapy.  2/26 24 ongoing        Plan   Plan of care Certification: 2/26/2024 to 4/22/24.     Outpatient Physical Therapy 2 times weekly for 8 weeks to include the following interventions: Gait Training, Neuromuscular Re-ed, Patient Education, Therapeutic Activities, and Therapeutic Exercise.     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      Estela Ann, PT

## 2024-03-21 NOTE — PROGRESS NOTES
"[facility]   Occupational Therapy Treatment Note     Name: Allyson Estrada  Clinic Number: [id number]    Therapy Diagnosis:   Encounter Diagnoses   Name Primary?    Decreased strength Yes    Deficits in activities of daily living     Decreased coordination      Physician: Jude Méndez MD    Physician Orders: Eval and Treat  Medical Diagnosis: G93.1 (ICD-10-CM) - Anoxic brain injury   Evaluation Date: 3/21/2024  Insurance Authorization Period Expiration:   Plan of Care Certification Period: 2024   Progress note due: 2024, 5/3/2024  Date of Return to MD: To be determined  Visit # / Visits authorized:  (+eval)  FOTO: 1/ 3, last assessed 3/8/2024    Precautions:  Standard    Time In: 4:50 PM   Time Out: 5:30 PM   Total Billable Time: 40 minutes    Subjective     Pt reports: pt reports doing well today. Pt and mom report no practice with knife at home.     he was not compliant with home exercise program given last session (practice cutting with butter knife - with supervision).   Response to previous treatment: no concerns  Functional change: no concerns    Pain: 0/10  Location: N/a    Objective     At 4:48 PM HH=814/83, HR=82     Treatment     Emerson received the treatments listed below:     Therapeutic exercises to develop strength and endurance for 8 minutes, includin hand grippers of varying resistances for increased  strength. Pt completed 10x each gripper, each hand    Therapeutic activities to improve functional performance for 32 minutes, including:    See vitals above     Pt participated in a cutting activity using a knife and fork to access carry over from previous session. Green thera putty was used today. Re ed safety with knifes. He was not able to verbalize the techniques, but able to fill in the blank when two options given (for example: You always hold on to the handle or the blade? Handle). He was able to cut "bite sized" pieces of each item with mod " verbal cues to use a sawing motion to cut completely through the putty. Required increased time to complete.     Coordination boards completed:    -Large buttons, x2, increased time + decreased coordination observed when trying to button the board back together. Little difficulty noted when un buttoning.    - Small buttons, x2, increased time + decreased coordination observed when trying to button the board back together. Little difficulty noted when un buttoning.     Neuromuscular re-education activities to improve Balance and Coordination for 0 minutes. The following activities were included:  N/a    Home Exercises and Education Provided     Education provided:   - Handwriting exercises at home   - Progress towards goals   - Cutting food and knife safety    Written Home Exercises Provided: yes.  Exercises were reviewed and Emerson was able to demonstrate them prior to the end of the session.  Emerson demonstrated good  understanding of the HEP provided.     See EMR under Patient Instructions for exercises provided 3/8/2024.     Assessment     Emerson tolerated his session well. He was able to complete the cutting task with cues to make bite sized pieces. Re ed: knife safety. Difficulty observed when unbuttoning both small and large buttons.  strengthening completed bilaterally with min difficulty.     Emerson's rehab potential is Good.     Anticipated barriers to occupational therapy: Potentially transportation as patient does not drive.    Patient's spiritual, cultural and educational needs considered and patient is agreeable to the plan of care and goals as stated below:     Goals:  Short Term Goals:    Short Term Goals (6 weeks) Status When Last Assessed  Progressing/ Met   1) Pt will complete HEP with minimal verbal cues from parent  progressing 3/21/2024    2) Handwriting to be assessed and goal added Completed 3/8/2024. See goal below.  Completed 3/8/2024    3) Pt will increase  strength as evidenced by 5#  increase on dynamometer bilaterally R: 46.3#  L: 63.3# progressing 3/21/2024    4) Pt to be able to wash his back in the shower independently with adaptive equipment as needed  progressing 3/21/2024    5) Pts parent to report increased independence with oral hygiene including appropriate amount of toothpaste for task  progressing 3/21/2024    6) Pt will tie his shoes with minimal verbal cues   progressing 3/21/2024    7) Pt will prepare a cold meal from start to finish using appropriately portioned ingredients with minimal verbal cues   progressing 3/21/2024    8) New goal 3/8/2024: Patient will be able to copy 2 sentences from a book without skipping words with 80% legibility.   progressing 3/21/2024         LongTerm Goals (12 weeks) Status When Last Assessed  Progressing/ Met   1) Pt will increase his Box and Blocks score as evidenced by 10 block increase bilaterally to increase gross motor coordination R: 26  L: 21 progressing 3/21/2024    2) Pt will increase fine motor coordination as evidenced by completion of 9 Hole Peg test in 40 seconds or less bilaterally   R: 54s  L: 53s progressing 3/21/2024    3) Pt will increase  strength as evidenced by 10# increase on dynamometer bilaterally R: 46.3#  L: 63.3# progressing 3/21/2024    4) Pt will use butter knife appropriately with no safety concerns during meal preparation 5/5 attempts  progressing 3/21/2024    5)  Pt will perform UBD / LBD independently, including buttons, fasteners, tying shoes, and with correct orientation of clothing items   progressing 3/21/2024    6) Pts parent will self report increased efficiency with toilet hygiene   progressing 3/21/2024     Additional functional goals to be added as patient progresses and per his priorities.    Plan   Certification Period/Plan of care expiration: 3/21/2024 to 5/28/2024.    Outpatient Occupational Therapy 2 times weekly for 12 weeks to include the following interventions: Patient Education, Self Care,  Therapeutic Activities, and Therapeutic Exercise.    Updates/Grading for next session: continue handwriting, upgrade UBE, in-hand manipulation,  strength, shoe tying, portion sizes (toothpaste, condiments)     Marcelle Sommer MS, OTR/L

## 2024-03-26 ENCOUNTER — CLINICAL SUPPORT (OUTPATIENT)
Dept: REHABILITATION | Facility: HOSPITAL | Age: 19
End: 2024-03-26
Payer: COMMERCIAL

## 2024-03-26 DIAGNOSIS — R53.1 DECREASED STRENGTH: Primary | ICD-10-CM

## 2024-03-26 DIAGNOSIS — R27.8 IMPAIRED GROSS MOTOR COORDINATION: Primary | ICD-10-CM

## 2024-03-26 DIAGNOSIS — R27.8 DECREASED COORDINATION: ICD-10-CM

## 2024-03-26 DIAGNOSIS — Z78.9 DEFICITS IN ACTIVITIES OF DAILY LIVING: ICD-10-CM

## 2024-03-26 PROCEDURE — 97110 THERAPEUTIC EXERCISES: CPT | Mod: PO

## 2024-03-26 PROCEDURE — 97530 THERAPEUTIC ACTIVITIES: CPT | Mod: PO

## 2024-03-26 NOTE — PROGRESS NOTES
"  [facility]   Physical Therapy Treatment/  Progress Note     Name: Allyson Estrada  Clinic Number: [id number]      Therapy Diagnosis:   Encounter Diagnosis   Name Primary?    Impaired gross motor coordination Yes       Physician: Jude Méndez MD    Visit Date: 3/26/2024    Physician Orders: PT Eval and Treat Neuro Program   Medical Diagnosis from Referral: Anoxic brain injury [G93.1]   Evaluation Date: 2/26/2024  Authorization Period Expiration: 2/18/2025  Plan of Care Expiration: 4/22/24  Progress Note Due: 3/25/24  Date of Surgery: NA  Visit # / Visits authorized: 8/ 20 (9)   FOTO: 1/ 3      Time In: 1609 (Pt arrived late)   Time Out: 1647  Total Billable Time: 38 minutes    Precautions: Standard, Fall, and developmental delay     PTA visit 0/5      Subjective     Pt reports: "I'm good."     He was not compliant with home exercise program.     Response to previous treatment: good  Functional change: ongoing    Pain: None   Location: NA      Objective      Objective Measures updated in Therapeutic Activities section below.     FOTO Balance Survey:       Therapist reviewed FOTO scores for Allyson Estrada on 3/26/2024.   FOTO documents entered into [employer] - see Media section.    Functional Status Score: 57       Activities- specific Balance Confidence Scale (ABC) Score:     Intake: 50.6  3/26/24: 66.3           Treatment     Emerson received the treatments listed below:      Emerson participated in therapeutic exercises to develop strength and endurance for 23 minutes including:     - Sci Fit Level 4 x 10 minutes with bilateral upper and lower extremities      - Shuttle Leg Press:               - 2x10 75 # bilateral lower extremity with focus on eccentric control               - 2x10 50 # single lower extremity right and left     (Therapeutic rest breaks as needed)     therapeutic activities to improve functional performance for 15 minutes, including:    Reassessment of the following:     Single " "Leg Stance:    - Right 9 sec   - Left 5 sec    APTA Core Set Outcome Measures for Adults with Neurologic Conditions     Evaluation 2/26/24 Progress Note   3/25/24 Reference values    Timed Up and Go 10.3 sec (average of 3 trials);    10.44 sec     Trial 1) 11.09  Trial 2) 10.10  Trial 3) 9.22  score >14 seconds indicates risk for falls in older stroke patients (Leif et al, 2006)   10 Meter Walk Test  1.2 m/sec (6m/5s)   "community ambulator" speed category 1.00 m/s (6m/ 5.99s) 0-0.4 m/s = household walker  0.4-0.8 m/s = limited community ambulator   0.8-1.4 m/s = community ambultor  >1.4 m/s = can cross street safely    Functional Gait Assessment  19/30 14/30  <22/30 = identifies fall risk in community dwelling older adults   (MCID = 4)   Lyles Balance Test Not assessed due to clinical judgement     Not Assessed  Fall risk cut-off for stroke= 45/56   6 Minute Walk Test   Assess in upcoming visit.                    Not Assessed 6 Minute Walk Test Distances: Means by Age and Gender     Age Gender Mean   60-69 Female  Male 572 meters (1876 feet)  538 meters (1765 feet)   70-79 Female  Male 527 meters (1729 feet)  471 meters (1545 feet)   80-89 Female  Male 417 meters (1368 feet)  392 meters ( 1286 feet)    LEONOR Mcmullen (2000)       5 times sit-stand    16 seconds    13.25 seconds  >12 sec= fall risk for general elderly  >10 sec= balance/vestibular dysfunction (<59 y/o)  >14.2 sec= balance/vestibular dysfunction (>59 y/o)  >12 sec= fall risk for stroke     Functional Gait Assessment:   1. Gait on level surface =  2   (3) Normal: less than 5.5 sec, no A.D., no imbalance, normal gait pattern, deviates< 6in   (2) Mild impairment: 7-5.6 sec, uses A.D., mild gait deviations, or deviates 6-10 in   (1) Moderate impairment: > 7 sec, slow speed, imbalance, deviates 10-15 in.   (0) Severe impairment: needs assist, deviates >15 in, reach/touch wall  2. Change in Gait Speed = 2   (3) Normal: smooth change w/o loss of balance " or gait deviation, deviates < 6 in, significant difference between speeds   (2) Mild impairment: changes speed, but demonstrates mild gait deviations, deviates 6-10 in, OR no deviations but unable to significantly speed, OR uses A.D.   (1) Moderate impairment: minor changes to speed, OR changes speed w/ significant deviations, deviates 10-15 in, OR  Changes speed , but loses balance & recovers   (0) Severe impairment: cannot change speed, deviates >15 in, or loses balance & needs assist  3. Gait with horizontal head turns  = 2   (3) Normal: no change in gait, deviates <6 in   (2) Mild impairment: slight change in speed, deviates 6-10 in, OR uses A.D.   (1) Moderate impairment: moderate change in speed, deviates 10-15 in   (0) Severe impairment: severe disruption of gait, deviates >15in  4. Gait with vertical head turns = 2   (3) Normal: no change in gait, deviates <6 in   (2) Mild impairment: slight change in speed, deviates 6-10 in OR uses A.D.   (1) Moderate impairment: moderate change in speed, deviates 10-15 in   (0) Severe impairment: severe disruption of gait, deviates >15 in  5. Gait with pivot turns = 2   (3) Normal: performs safely in 3 sec, no LOB   (2) Mild impairment: performs in >3 sec & no LOB, OR turns safely & requires several steps to regain LOB   (1) Moderate impairment: turns slow, OR requires several small steps for balance following turn & stop   (0) Severe impairment: cannot turn safely, needs assist  6. Step over obstacle = 2   (3) Normal: steps over 2 stacked boxes w/o change in speed or LOB   (2) Mild impairment: able to step over 1 box w/o change in speed or LOB   (1) Moderate impairment: steps over 1 box but must slow down, may require VC   (0) Severe impairment: cannot perform w/o assist  7. Gait with Narrow INDIRA = 0   (3) Normal: 10 steps no staggering   (2) Mild impairment: 7-9 steps   (1) Moderate impairment: 4-7 steps   (0) Severe impairment: < 4 steps or cannot perform w/o assist  8.  Gait with eyes closed = 1    (3) Normal: < 7 sec, no A.D., no LOB, normal gait pattern, deviates <6 in   (2) Mild impairment: 7.1-9 sec, mild gait deviations, deviates 6-10 in   (1) Moderate impairment: > 9 sec, abnormal pattern, LOB, deviates 10-15 in   (0) Severe impairment: cannot perform w/o assist, LOB, deviates >15in  9. Ambulating Backwards = 1   (3) Normal: no A.D., no LOB, normal gait pattern, deviates <6in   (2) Mild impairment: uses A.D., slower speed, mild gait deviations, deviates 6-10 in   (1) Moderate impairment: slow speed, abnormal gait pattern, LOB, deviates 10-15 in   (0) Severe impairment: severe gait deviations or LOB, deviates >15in  10. Steps = 2   (3) Normal: alternating feet, no rail   (2) Mild Impairment: alternating feet, uses rail   (1) Moderate impairment: step-to, uses rail   (0) Severe impairment: cannot perform safely    Score 14/30     Score:   <22/30 fall risk   <20/30 fall risk in older adults   <18/30 fall risk in Parkinsons     Stairs:     - 5x4 stairs without upper extremity support on ascent, single upper extremity support on descent. Stand-by assist  and increased time.     (Therapeutic rest breaks as needed)       Home Exercises Provided and Patient Education Provided     Education provided:     - Proper form for all exercises included in today's session.      Written Home Exercises Provided: In previous session.   Exercises were reviewed.  Emerson demonstrated fair  understanding of the education provided.     See EMR under Patient Instructions for exercises provided 3/1/2024.    Assessment     Emerson tolerated session well with focus on reassessment of objective measures. Emerson demonstrated improvements in single leg stance (right > left) time and performance of Five Time Sit to Stand Test. His ABC score also improved. He demonstrated slight regression in gait speed and Functional Gait Assessment score and remains at increased risk of falls. He remains highly motivated to  participate and appropriate for PT.     Emerson Is progressing well towards his goals.   Pt prognosis is Good.     Pt will continue to benefit from skilled outpatient physical therapy to address the deficits listed in the problem list box on initial evaluation, provide pt/family education and to maximize pt's level of independence in the home and community environment.     Pt's spiritual, cultural and educational needs considered and pt agreeable to plan of care and goals.     Anticipated barriers to physical therapy: none at this time.     Goals: Goals:  Short Term Goals: (4 weeks) Date established: Status:    1. Patient will be provided with an individualized home exercise program.  2/26/24 MET   2.  Patient will demonstrate improve endurance and activity tolerance as evidenced by ability to perform Nu-step x 15 minutes without rests breaks with heart rate post-activity equivalent to 50-80% of maximum heart rate.  2/26/24 ongoing         Long Term Goals: (8 weeks) Date established: Status:    1. Patient will be independent and compliant with updated home exercise program. 2/26/24    ongoing   2.  Patient will increase his gait speed as assessed by the Timed 10 Meter Walk Test from 1.2 m/s to >1.4 m/s to increase his safety and independence with gait at a community level. (Minimal Clinically Important Difference for older adults = 0.13 m/s)    2/26/24    ongoing      3. Patient will improve his score on the Functional Gait Assessment (FGA) from 19/30 to at least 23/30, indicating improved safety and (I) with dynamic, community level gait. (Minimal Detectable Change for older adults= 4 points)  2/26/24 ongoing   4. Patient will maintain bilateral single leg stance for at least 15 seconds to improve standing balance and overall safety with functional tasks.  2/26/24 ongoing   5. Patient will perform 5 sit to stands in < 10 seconds to demonstrate a reduction in fall risk.  2/26/24 ongoing   6. Patient will begin some  form of community fitness to begin regular and consistent performance of exercise to continue maintenance of gains made in physical therapy.  2/26 24 ongoing        Plan   Plan of care Certification: 2/26/2024 to 4/22/24.     Outpatient Physical Therapy 2 times weekly for 8 weeks to include the following interventions: Gait Training, Neuromuscular Re-ed, Patient Education, Therapeutic Activities, and Therapeutic Exercise.     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      Estela Ann, PT

## 2024-03-26 NOTE — PROGRESS NOTES
"[facility]   Occupational Therapy Treatment Note     Name: Allyson Estrada  Clinic Number: [id number]    Therapy Diagnosis:   Encounter Diagnoses   Name Primary?    Decreased strength Yes    Deficits in activities of daily living     Decreased coordination      Physician: Jude Méndez MD    Physician Orders: Eval and Treat  Medical Diagnosis: G93.1 (ICD-10-CM) - Anoxic brain injury   Evaluation Date: 3/26/2024  Insurance Authorization Period Expiration:   Plan of Care Certification Period: 5/28/2024   Progress note due: 4/5/2024, 5/3/2024  Date of Return to MD: To be determined  Visit # / Visits authorized: 5/20 (+eval)  FOTO: 1/ 3, last assessed 3/8/2024    Precautions:  Standard    Time In: 4:46 PM   Time Out: 5:30 PM   Total Billable Time: 44 minutes    Subjective     Pt reports: pt reports doing well today. Pt's mom reported practicing with a butter knife at home with cutting play dough and foam. She stated that he has gotten progressively better.     he was compliant with home exercise program given last session (practice cutting with butter knife - with supervision).   Response to previous treatment: no concerns  Functional change: no concerns    Pain: 0/10  Location: N/a    Objective     BP not taken this date.    Treatment     Emerson received the treatments listed below:     Therapeutic exercises to develop strength and endurance for 6 minutes, including:    -UBE x3 min forward/ 3 min back w/ erika UE, level 3.0, standing to increase erika UE act aung and UB strength, as well as improving cardiovascular fitness. He was encouraged to keep rate of perceived exertion (RPE) at "easy to moderate" (3-4/10).. West avg=23, peak west=32.      Therapeutic activities to improve functional performance for 38 minutes, including:    Pt participated in repetitive task practice for the first step of shoe tying. Beginning with pipe , the task was broken down into 4 steps: placing pipe  " "side by side, making an "X", creating a small Council around the junction, and pulling tight. This was repeated with the pipe  using mod a and max verbal cues for hand placement, orientation, and sequencing. With each trial, Emerson progressively required less assistance. The activity was translated to using one ~1'6" rope with the same set of 4 steps. He required mod a and max verbal cues for hand placement, orientation, and sequencing, but cues lessened quicker than required for pipe . He then was instructed to place his shoe on the table top where the same 4 steps were repeated, requiring mod a and mod verbal cues for hand placement, orientation and sequencing, until he progressed to min verbal cues. Lastly, he was able to put his shoe on and transfer the 4 steps while the shoe was on his foot. By the end of the activity he required mod verbal cues for sequencing when completing the first knot in shoe tying.    Neuromuscular re-education activities to improve Balance and Coordination for 0 minutes. The following activities were included:  N/a    Home Exercises and Education Provided     Education provided:   - Handwriting exercises at home   - Progress towards goals   - Cutting food and knife safety    Written Home Exercises Provided: yes.  Exercises were reviewed and Emerson was able to demonstrate them prior to the end of the session.  Emerson demonstrated good  understanding of the HEP provided.     See EMR under Patient Instructions for exercises provided 3/8/2024.     Assessment     Emerson tolerated his session very well today. UBE completed for shorter duration due to time constraint. He responded very well to repetitive task practice and was able to complete the first knot in shoe tying with mod verbal cues by the end of the session.      Emerson's rehab potential is Good.     Anticipated barriers to occupational therapy: Potentially transportation as patient does not drive.    Patient's spiritual, " cultural and educational needs considered and patient is agreeable to the plan of care and goals as stated below:     Goals:  Short Term Goals:    Short Term Goals (6 weeks) Status When Last Assessed  Progressing/ Met   1) Pt will complete HEP with minimal verbal cues from parent  progressing 3/26/2024    2) Handwriting to be assessed and goal added Completed 3/8/2024. See goal below.  Completed 3/8/2024    3) Pt will increase  strength as evidenced by 5# increase on dynamometer bilaterally R: 46.3#  L: 63.3# progressing 3/26/2024    4) Pt to be able to wash his back in the shower independently with adaptive equipment as needed  progressing 3/26/2024    5) Pts parent to report increased independence with oral hygiene including appropriate amount of toothpaste for task  progressing 3/26/2024    6) Pt will tie his shoes with minimal verbal cues   progressing 3/26/2024    7) Pt will prepare a cold meal from start to finish using appropriately portioned ingredients with minimal verbal cues   progressing 3/26/2024    8) New goal 3/8/2024: Patient will be able to copy 2 sentences from a book without skipping words with 80% legibility.   progressing 3/26/2024         LongTerm Goals (12 weeks) Status When Last Assessed  Progressing/ Met   1) Pt will increase his Box and Blocks score as evidenced by 10 block increase bilaterally to increase gross motor coordination R: 26  L: 21 progressing 3/26/2024    2) Pt will increase fine motor coordination as evidenced by completion of 9 Hole Peg test in 40 seconds or less bilaterally   R: 54s  L: 53s progressing 3/26/2024    3) Pt will increase  strength as evidenced by 10# increase on dynamometer bilaterally R: 46.3#  L: 63.3# progressing 3/26/2024    4) Pt will use butter knife appropriately with no safety concerns during meal preparation 5/5 attempts  progressing 3/26/2024    5)  Pt will perform UBD / LBD independently, including buttons, fasteners, tying shoes, and with  correct orientation of clothing items   progressing 3/26/2024    6) Pts parent will self report increased efficiency with toilet hygiene   progressing 3/26/2024     Additional functional goals to be added as patient progresses and per his priorities.    Plan   Certification Period/Plan of care expiration: 3/26/2024 to 5/28/2024.    Outpatient Occupational Therapy 2 times weekly for 12 weeks to include the following interventions: Patient Education, Self Care, Therapeutic Activities, and Therapeutic Exercise.    Updates/Grading for next session: continue handwriting, upgrade UBE, in-hand manipulation,  strength, shoe tying, portion sizes (toothpaste, condiments)     SALMA Kline,  3/26/2024    I certify that I was present in the room directing the student in service delivery and guiding them using my skilled judgment. As the co-signing therapist I have reviewed the students documentation and am responsible for the treatment, assessment, and plan.     Marcelle Sommer MS, OTR/L

## 2024-03-28 ENCOUNTER — CLINICAL SUPPORT (OUTPATIENT)
Dept: REHABILITATION | Facility: HOSPITAL | Age: 19
End: 2024-03-28
Payer: COMMERCIAL

## 2024-03-28 DIAGNOSIS — Z78.9 DEFICITS IN ACTIVITIES OF DAILY LIVING: ICD-10-CM

## 2024-03-28 DIAGNOSIS — R27.8 DECREASED COORDINATION: ICD-10-CM

## 2024-03-28 DIAGNOSIS — R53.1 DECREASED STRENGTH: Primary | ICD-10-CM

## 2024-03-28 DIAGNOSIS — R27.8 IMPAIRED GROSS MOTOR COORDINATION: Primary | ICD-10-CM

## 2024-03-28 PROCEDURE — 97110 THERAPEUTIC EXERCISES: CPT | Mod: PO

## 2024-03-28 PROCEDURE — 97530 THERAPEUTIC ACTIVITIES: CPT | Mod: PO

## 2024-03-28 NOTE — PROGRESS NOTES
"  [facility]   Physical Therapy Treatment Note      Name: Allyson Estrada  [facility] Number: [id number]      Therapy Diagnosis:   Encounter Diagnosis   Name Primary?    Impaired gross motor coordination Yes           Physician: Jude Méndez MD    Visit Date: 3/28/2024    Physician Orders: PT Eval and Treat Neuro Program   Medical Diagnosis from Referral: Anoxic brain injury [G93.1]   Evaluation Date: 2/26/2024  Authorization Period Expiration: 2/18/2025  Plan of Care Expiration: 4/22/24  Progress Note Due: 3/25/24  Date of Surgery: NA  Visit # / Visits authorized: 9/ 20 (10)   FOTO: 1/ 3     Time In: 1603  Time Out: 1643   Total Billable Time: 40 minutes     Precautions: Standard, Fall, and developmental delay     PTA visit 0/5      Subjective     Pt reports: "I have never played that game before."     He was not compliant with home exercise program.     Response to previous treatment: good  Functional change: ongoing    Pain: None   Location: NA      Objective      Objective Measures updated at progress report unless specified.     Treatment     Emerson received the treatments listed below:      therapeutic exercises to develop strength for 10 minutes including:    - Sci Fit Bike Level 2 x 10 minutes with inconsistent pedal strokes     (Therapeutic rest breaks as needed)     therapeutic activities to improve functional performance for 30 minutes, including:    - 2x10 right and left step down from 6" step, max cues for set up   - 3x30" standing bilateral gastroc stretch on half foam roll   - 3x30" single leg stance on right and left with occasional single upper extremity support   - Red light green light focusing on changing gait speed for 725 feet practicing changing gait speed (Green light = fast, yellow light = slow, red light = stop)   - Tandem walking in parallel bars x2 laps with single upper extremity support   - Backward walking in parallel bars x3 laps feet with minimal upper " extremity support   - x10 sit to stand without upper extremity support     (Therapeutic rest breaks as needed)       Home Exercises Provided and Patient Education Provided     Education provided:     - Proper form for all exercises included in today's session.      Written Home Exercises Provided: In previous session.   Exercises were reviewed.  Emerson demonstrated fair  understanding of the education provided.     See EMR under Patient Instructions for exercises provided 3/1/2024.    Assessment     Emerson tolerated session well with focus on balance and changing gait speed. He demonstrated improvements in heel strike and foot clearance when walking at increased speed during Red Light Green light. He also demonstrated improvements in varying gait speed when given verbal cues. He continues to demonstrate improvements in therapy and is motivated to participate.     Emerson Is progressing well towards his goals.   Pt prognosis is Good.     Pt will continue to benefit from skilled outpatient physical therapy to address the deficits listed in the problem list box on initial evaluation, provide pt/family education and to maximize pt's level of independence in the home and community environment.     Pt's spiritual, cultural and educational needs considered and pt agreeable to plan of care and goals.     Anticipated barriers to physical therapy: none at this time.     Goals: Goals:  Short Term Goals: (4 weeks) Date established: Status:    1. Patient will be provided with an individualized home exercise program.  2/26/24 MET   2.  Patient will demonstrate improve endurance and activity tolerance as evidenced by ability to perform Nu-step x 15 minutes without rests breaks with heart rate post-activity equivalent to 50-80% of maximum heart rate.  2/26/24 ongoing         Long Term Goals: (8 weeks) Date established: Status:    1. Patient will be independent and compliant with updated home exercise program. 2/26/24    ongoing   2.   Patient will increase his gait speed as assessed by the Timed 10 Meter Walk Test from 1.2 m/s to >1.4 m/s to increase his safety and independence with gait at a community level. (Minimal Clinically Important Difference for older adults = 0.13 m/s)    2/26/24    ongoing      3. Patient will improve his score on the Functional Gait Assessment (FGA) from 19/30 to at least 23/30, indicating improved safety and (I) with dynamic, community level gait. (Minimal Detectable Change for older adults= 4 points)  2/26/24 ongoing   4. Patient will maintain bilateral single leg stance for at least 15 seconds to improve standing balance and overall safety with functional tasks.  2/26/24 ongoing   5. Patient will perform 5 sit to stands in < 10 seconds to demonstrate a reduction in fall risk.  2/26/24 ongoing   6. Patient will begin some form of community fitness to begin regular and consistent performance of exercise to continue maintenance of gains made in physical therapy.  2/26 24 ongoing        Plan   Plan of care Certification: 2/26/2024 to 4/22/24.     Outpatient Physical Therapy 2 times weekly for 8 weeks to include the following interventions: Gait Training, Neuromuscular Re-ed, Patient Education, Therapeutic Activities, and Therapeutic Exercise.     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      Estela Ann, PT

## 2024-03-28 NOTE — PROGRESS NOTES
"[facility]   Occupational Therapy Treatment Note     Name: Allyson Estrada  Clinic Number: [id number]    Therapy Diagnosis:   Encounter Diagnoses   Name Primary?    Decreased strength Yes    Deficits in activities of daily living     Decreased coordination      Physician: Jude Méndez MD    Physician Orders: Eval and Treat  Medical Diagnosis: G93.1 (ICD-10-CM) - Anoxic brain injury   Evaluation Date: 3/28/2024  Insurance Authorization Period Expiration:   Plan of Care Certification Period: 5/28/2024   Progress note due: 4/5/2024, 5/3/2024  Date of Return to MD: To be determined  Visit # / Visits authorized: 5/20 (+eval)  FOTO: 1/ 3, last assessed 3/8/2024    Precautions:  Standard    Time In: 1650  Time Out: 1730  Total Billable Time: 40 minutes    Subjective     Pt reports: "Good." Mom reports that they have not been working on shoe tying at home and that she keeps his shoes tied.  At end of session, mom was surprised to learn he was able to portion everything well and stated they would try to complete sandwich making at home.     he was compliant with home exercise program given last session (practice cutting with butter knife - with supervision).   Response to previous treatment: no concerns  Functional change: no concerns    Pain: 0/10  Location: N/a    Objective     At 1651, JO=426/69, HR=68, SpO2=97%.      Treatment     Emerson received the treatments listed below:     Therapeutic exercises to develop strength and endurance for 11 minutes, including:    -UBE x5 min forward/ 5 min back w/ erika UE, level 3.2, standing to increase erika UE act aung and UB strength, as well as improving cardiovascular fitness. He was encouraged to keep rate of perceived exertion (RPE) at "easy to moderate" (3-4/10). West avg= 19, peak west= 31.  Patient reported it was "easy".     Therapeutic activities to improve functional performance for 29 minutes, including:    Pt participated in simulated cold meal " Patient unable to locate a ride home from hospital. Obtained taxi voucher. "prep to work on portion sizes. Patient was instructed verbally with ingredient listed below. One ingredient was told to him at a time to allow for time for him to process. He was able to build all sandwiches with very minimal difficulty. He evenly spread portions of condiments onto the "bread" and when instructed to make his own sandwich he picked appropriate amounts of each ingredient.     [place] 1: 2 pieces of meat, 1 piece of cheese, 2 pickles, 1 tomato, 3 pieces of lettuce, sal   [place] 2: 1 pice of meat, 2 pieces of cheese, 4 pickles, 3 tomatoes, 1 piece of lettuce, mustard and sal  [place] 3: 1 piece of meat, 2 pieces of cheese, 2 tomatoes, sal   [place] 4: patient was instructed to make a sandwich with whatever ingredients he would like,the only requirement was to use either sal or mustard. He chose 1 piece of meat, 2 pieces of cheese, 2 pickles, 1 piece of lettuce, sal and mustard. He reported it was harder to complete when he picked the ingredients.     Patient completed handwriting of his first and last name twice and 1 sentence that was verbally told to him. He had 100% legibility.     Neuromuscular re-education activities to improve Balance and Coordination for 0 minutes. The following activities were included:  N/a    Home Exercises and Education Provided     Education provided:   - Handwriting exercises at home   - Progress towards goals   - Cutting food and knife safety  -[place] making at home    Written Home Exercises Provided: Patient instructed to cont prior HEP.  Exercises were reviewed and Emerson was able to demonstrate them prior to the end of the session.  Emerson demonstrated good  understanding of the HEP provided.     See EMR under Patient Instructions for exercises provided 3/8/2024.     Assessment     Emerson tolerated his session very well today. He had no difficulty with increase in UBE and reported it to be "easy". Minimal difficulty with sandwich making. He did require increased " time to process the ingredients told to him to put on the sandwich. He was able to portion out condiments very well with no overuse. Handwriting continues to be 100% legible.     Emerson's rehab potential is Good.     Anticipated barriers to occupational therapy: Potentially transportation as patient does not drive.    Patient's spiritual, cultural and educational needs considered and patient is agreeable to the plan of care and goals as stated below:     Goals:  Short Term Goals:    Short Term Goals (6 weeks) Status When Last Assessed  Progressing/ Met   1) Pt will complete HEP with minimal verbal cues from parent  progressing 3/28/2024    2) Handwriting to be assessed and goal added Completed 3/8/2024. See goal below.  Completed 3/8/2024    3) Pt will increase  strength as evidenced by 5# increase on dynamometer bilaterally R: 46.3#  L: 63.3# progressing 3/28/2024    4) Pt to be able to wash his back in the shower independently with adaptive equipment as needed  progressing 3/28/2024    5) Pts parent to report increased independence with oral hygiene including appropriate amount of toothpaste for task  progressing 3/28/2024    6) Pt will tie his shoes with minimal verbal cues   progressing 3/28/2024    7) Pt will prepare a cold meal from start to finish using appropriately portioned ingredients with minimal verbal cues   progressing 3/28/2024    8) New goal 3/8/2024: Patient will be able to copy 2 sentences from a book without skipping words with 80% legibility.   progressing 3/28/2024         LongTerm Goals (12 weeks) Status When Last Assessed  Progressing/ Met   1) Pt will increase his Box and Blocks score as evidenced by 10 block increase bilaterally to increase gross motor coordination R: 26  L: 21 progressing 3/28/2024    2) Pt will increase fine motor coordination as evidenced by completion of 9 Hole Peg test in 40 seconds or less bilaterally   R: 54s  L: 53s progressing 3/28/2024    3) Pt will increase   strength as evidenced by 10# increase on dynamometer bilaterally R: 46.3#  L: 63.3# progressing 3/28/2024    4) Pt will use butter knife appropriately with no safety concerns during meal preparation 5/5 attempts 4/4 attempts with no safety concerns on 3/28/2024 progressing 3/28/2024    5)  Pt will perform UBD / LBD independently, including buttons, fasteners, tying shoes, and with correct orientation of clothing items   progressing 3/28/2024    6) Pts parent will self report increased efficiency with toilet hygiene   progressing 3/28/2024     Additional functional goals to be added as patient progresses and per his priorities.    Plan   Certification Period/Plan of care expiration: 3/28/2024 to 5/28/2024.    Outpatient Occupational Therapy 2 times weekly for 12 weeks to include the following interventions: Patient Education, Self Care, Therapeutic Activities, and Therapeutic Exercise.    Updates/Grading for next session: continue handwriting (copying), upgrade UBE, in-hand manipulation,  strength, shoe tying, portion sizes (toothpaste, condiments)     DANNIE Cowan   3/28/2024    I certify that I was present in the room directing the student in service delivery and guiding them using my skilled judgment. As the co-signing therapist I have reviewed the students documentation and am responsible for the treatment, assessment, and plan.   ERWIN Douglas, LOTR   3/28/2024

## 2024-04-02 ENCOUNTER — CLINICAL SUPPORT (OUTPATIENT)
Dept: REHABILITATION | Facility: HOSPITAL | Age: 19
End: 2024-04-02
Payer: COMMERCIAL

## 2024-04-02 DIAGNOSIS — R27.8 DECREASED COORDINATION: ICD-10-CM

## 2024-04-02 DIAGNOSIS — R27.8 IMPAIRED GROSS MOTOR COORDINATION: Primary | ICD-10-CM

## 2024-04-02 DIAGNOSIS — R53.1 DECREASED STRENGTH: Primary | ICD-10-CM

## 2024-04-02 DIAGNOSIS — Z78.9 DEFICITS IN ACTIVITIES OF DAILY LIVING: ICD-10-CM

## 2024-04-02 PROCEDURE — 97530 THERAPEUTIC ACTIVITIES: CPT | Mod: PO

## 2024-04-02 PROCEDURE — 97110 THERAPEUTIC EXERCISES: CPT | Mod: PO,CQ

## 2024-04-02 PROCEDURE — 97530 THERAPEUTIC ACTIVITIES: CPT | Mod: PO,CQ

## 2024-04-02 PROCEDURE — 97110 THERAPEUTIC EXERCISES: CPT | Mod: PO

## 2024-04-02 NOTE — PROGRESS NOTES
[facility]   Physical Therapy Treatment Note      Name: Allyson Estrada  [facility] Number: [id number]      Therapy Diagnosis:   Encounter Diagnosis   Name Primary?    Impaired gross motor coordination Yes     Physician: Jude Méndez MD    Visit Date: 4/2/2024    Physician Orders: PT Eval and Treat Neuro Program   Medical Diagnosis from Referral: Anoxic brain injury [G93.1]   Evaluation Date: 2/26/2024  Authorization Period Expiration: 2/18/2025  Plan of Care Expiration: 4/22/24  Progress Note Due: 3/25/24  Date of Surgery: NA  Visit # / Visits authorized: 10/20 (11)   FOTO: 1/3     Time In: 1600  Time Out: 1640   Total Billable Time: 40 minutes     Precautions: Standard, Fall, and developmental delay     PTA visit 1/5      Subjective     Pt reports: without complaints  He reports was not compliant with home exercise program.   Response to previous treatment: no problems stated  Functional change: ongoing    Pain:0/10   Location: Not Applicable       Objective      Objective Measures updated at progress report unless specified.     Treatment     Emerson received the treatments listed below:      therapeutic exercises to develop strength, endurance and flexibility flexibility for 25 minutes including:    NuStep Level 4 10 minutes with Bilateral Upper Extremities     Parallel bars:  Bilateral calf stretch 3x30 seconds 1/2 roll    Shuttle:  62# Bilateral leg press 3 minutes   56# Unilateral leg press 1 minute Right Left     (Activity time includes skilled set-up and positioning as well as therapeutic rest)    therapeutic activities to improve functional performance for 15 minutes, including:    Gait without assistive device 120 feet and 150 feet without assistive device     Treadmill 1.9 miles per hour, 2% incline 10 minutes with Bilateral Upper Extremity support    Mini squat on BOSU flat in parallel bars 10 times and 5 times with decreased Bilateral upper extremity support    (Activity time  includes skilled set-up and positioning as well as therapeutic rest)    Home Exercises Provided and Patient Education Provided     Education provided:     - Patient provided with verbal and demonstrative instruction for all activities performed in today's session.     Written Home Exercises Provided: Instructed to continue home exercise program.        See EMR under Patient Instructions for exercises provided 3/1/2024.    Assessment     Emerson provided good participation and effort during today's session with treatment focused on lower extremity strengthening and functional mobility to improve ambulation safety. Emerson tolerated activities with complaints and good follow through with instruction for proper form.    Emerson Is progressing well towards his goals.   Pt prognosis is Good.     Pt will continue to benefit from skilled outpatient physical therapy to address the deficits listed in the problem list box on initial evaluation, provide pt/family education and to maximize pt's level of independence in the home and community environment.     Pt's spiritual, cultural and educational needs considered and pt agreeable to plan of care and goals.     Anticipated barriers to physical therapy: none at this time.     Goals: Goals:  Short Term Goals: (4 weeks) Date established: Status:    1. Patient will be provided with an individualized home exercise program.  2/26/24 MET   2.  Patient will demonstrate improve endurance and activity tolerance as evidenced by ability to perform Nu-step x 15 minutes without rests breaks with heart rate post-activity equivalent to 50-80% of maximum heart rate.  2/26/24 ongoing         Long Term Goals: (8 weeks) Date established: Status:    1. Patient will be independent and compliant with updated home exercise program. 2/26/24    ongoing   2.  Patient will increase his gait speed as assessed by the Timed 10 Meter Walk Test from 1.2 m/s to >1.4 m/s to increase his safety and independence with gait  at a community level. (Minimal Clinically Important Difference for older adults = 0.13 m/s)    2/26/24    ongoing      3. Patient will improve his score on the Functional Gait Assessment (FGA) from 19/30 to at least 23/30, indicating improved safety and (I) with dynamic, community level gait. (Minimal Detectable Change for older adults= 4 points)  2/26/24 ongoing   4. Patient will maintain bilateral single leg stance for at least 15 seconds to improve standing balance and overall safety with functional tasks.  2/26/24 ongoing   5. Patient will perform 5 sit to stands in < 10 seconds to demonstrate a reduction in fall risk.  2/26/24 ongoing   6. Patient will begin some form of community fitness to begin regular and consistent performance of exercise to continue maintenance of gains made in physical therapy.  2/26 24 ongoing        Plan   Plan of care Certification: 2/26/2024 to 4/22/24.     Outpatient Physical Therapy 2 times weekly for 8 weeks to include the following interventions: Gait Training, Neuromuscular Re-ed, Patient Education, Therapeutic Activities, and Therapeutic Exercise.       Felicitas Tucker, PTA

## 2024-04-02 NOTE — PROGRESS NOTES
[facility]   Occupational Therapy Treatment Note     Name: Allyson Estrada  [facility] Number: [id number]    Therapy Diagnosis:   Encounter Diagnoses   Name Primary?    Decreased strength Yes    Deficits in activities of daily living     Decreased coordination      Physician: Jude Méndez MD    Physician Orders: Eval and Treat  Medical Diagnosis: G93.1 (ICD-10-CM) - Anoxic brain injury   Evaluation Date: 4/2/2024  Insurance Authorization Period Expiration:   Plan of Care Certification Period: 5/28/2024   Progress note due: 4/5/2024, 5/3/2024  Date of Return to MD: To be determined  Visit # / Visits authorized: 6/20 (+eval)  FOTO: 1/ 3, last assessed 3/8/2024    Precautions:  Standard    Time In: 4:52 PM  Time Out: 5:30 PM  Total Billable Time: 38 minutes    Subjective     Pt reports: "doing good." Pt reported that he practiced tying a knot in the car with his mom, but that he has not practiced cutting or making sandwiches.    he was compliant with home exercise program given last session (practice tying).   Response to previous treatment: no concerns  Functional change: no concerns    Pain: 0/10  Location: N/a    Objective     At 4:56 PM, FS=744/76, HR=50, SpO2=97%.    Treatment     Emerson received the treatments listed below:     Therapeutic exercises to develop strength and endurance for 10 minutes, including:    -UBE x5 min forward/ 5 min back w/ erika UE, level 3.4 standing to increase erika UE act aung and UB strength, as well as improving cardiovascular fitness. He was encouraged to keep rate of perceived exertion (RPE) at "easy to moderate" (3-4/10). West avg= 23, peak west= 40.  Patient reported it was "medium".     Therapeutic activities to improve functional performance for 28 minutes, including:    Pt participated in a tying activity using the coordination board to address carry over from previous session (first knot in tying shoes). He performed (x5 knots), requiring a demonstration + " "max verbal cues, progressing to mod verbal cues for correct lacing/looping and hand placement.     Pt participated in a toothbrushing activity to address appropriate toothpaste quantity, appropriate duration and efficiency during tooth brushing, and education on importance of mouth hygiene. He squeezed toothpaste onto a toothbrush (x3) in an appropriate and timely manner. He was then educated on importance of brushing teeth for 2 minutes. A timer was set for 2 minutes and he brushed his teeth for the entire duration, requiring mod verbal cues to reach all areas of teeth and gums. He was able to verbalize that it is important to brush our teeth efficiently to avoid cavities.    Neuromuscular re-education activities to improve Balance and Coordination for 0 minutes. The following activities were included:  N/a    Home Exercises and Education Provided     Education provided:   - Handwriting exercises at home   - Progress towards goals   - Cutting food and knife safety  -[place] making at home    Written Home Exercises Provided: Patient instructed to cont prior HEP.  Exercises were reviewed and Emerson was able to demonstrate them prior to the end of the session.  Emerson demonstrated good  understanding of the HEP provided.     See EMR under Patient Instructions for exercises provided 3/8/2024.     Assessment     Emerson tolerated his session very well today. He had no difficulty with increase in UBE and reported it to be "medium" this date. He continues to require mod-max verbal cues + demonstration to tie the first knot used for tying shoes. No concerns noted with appropriate toothpaste quantity. He required mod verbal cues to reach all areas while brushing teeth. He was able to verbalize understanding of the importance of regular mouth hygiene.   Emerson's rehab potential is Good.     Anticipated barriers to occupational therapy: Potentially transportation as patient does not drive.    Patient's spiritual, cultural and " educational needs considered and patient is agreeable to the plan of care and goals as stated below:     Goals:  Short Term Goals:    Short Term Goals (6 weeks) Status When Last Assessed  Progressing/ Met   1) Pt will complete HEP with minimal verbal cues from parent  progressing 4/2/2024    2) Handwriting to be assessed and goal added Completed 3/8/2024. See goal below.  Completed 3/8/2024    3) Pt will increase  strength as evidenced by 5# increase on dynamometer bilaterally R: 46.3#  L: 63.3# progressing 4/2/2024    4) Pt to be able to wash his back in the shower independently with adaptive equipment as needed  progressing 4/2/2024    5) Pts parent to report increased independence with oral hygiene including appropriate amount of toothpaste for task  progressing 4/2/2024    6) Pt will tie his shoes with minimal verbal cues   progressing 4/2/2024    7) Pt will prepare a cold meal from start to finish using appropriately portioned ingredients with minimal verbal cues   progressing 4/2/2024    8) New goal 3/8/2024: Patient will be able to copy 2 sentences from a book without skipping words with 80% legibility.   progressing 4/2/2024         LongTerm Goals (12 weeks) Status When Last Assessed  Progressing/ Met   1) Pt will increase his Box and Blocks score as evidenced by 10 block increase bilaterally to increase gross motor coordination R: 26  L: 21 progressing 4/2/2024    2) Pt will increase fine motor coordination as evidenced by completion of 9 Hole Peg test in 40 seconds or less bilaterally   R: 54s  L: 53s progressing 4/2/2024    3) Pt will increase  strength as evidenced by 10# increase on dynamometer bilaterally R: 46.3#  L: 63.3# progressing 4/2/2024    4) Pt will use butter knife appropriately with no safety concerns during meal preparation 5/5 attempts 4/4 attempts with no safety concerns on 3/28/2024 progressing 4/2/2024    5)  Pt will perform UBD / LBD independently, including buttons, fasteners,  tying shoes, and with correct orientation of clothing items   progressing 4/2/2024    6) Pts parent will self report increased efficiency with toilet hygiene   progressing 4/2/2024     Additional functional goals to be added as patient progresses and per his priorities.    Plan   Certification Period/Plan of care expiration: 4/2/2024 to 5/28/2024.    Outpatient Occupational Therapy 2 times weekly for 12 weeks to include the following interventions: Patient Education, Self Care, Therapeutic Activities, and Therapeutic Exercise.    Updates/Grading for next session: continue handwriting (copying), upgrade UBE, in-hand manipulation,  strength, shoe tying, toilet hygiene, shoulder AROM (for washing his back)    DANNIE Kline   4/2/2024    I certify that I was present in the room directing the student in service delivery and guiding them using my skilled judgment. As the co-signing therapist I have reviewed the students documentation and am responsible for the treatment, assessment, and plan.     Marcelle Sommer MS, OTR/L

## 2024-04-04 ENCOUNTER — CLINICAL SUPPORT (OUTPATIENT)
Dept: REHABILITATION | Facility: HOSPITAL | Age: 19
End: 2024-04-04
Payer: COMMERCIAL

## 2024-04-04 DIAGNOSIS — R53.1 DECREASED STRENGTH: Primary | ICD-10-CM

## 2024-04-04 DIAGNOSIS — R27.8 IMPAIRED GROSS MOTOR COORDINATION: Primary | ICD-10-CM

## 2024-04-04 DIAGNOSIS — R27.8 DECREASED COORDINATION: ICD-10-CM

## 2024-04-04 DIAGNOSIS — Z78.9 DEFICITS IN ACTIVITIES OF DAILY LIVING: ICD-10-CM

## 2024-04-04 PROCEDURE — 97110 THERAPEUTIC EXERCISES: CPT | Mod: PO

## 2024-04-04 PROCEDURE — 97530 THERAPEUTIC ACTIVITIES: CPT | Mod: PO

## 2024-04-04 NOTE — PROGRESS NOTES
"  [facility] OUTPATIENT THERAPY AND WELLNESS   Physical Therapy Treatment Note      Name: Allyson Estrada  [facility] Number: [id number]      Therapy Diagnosis:   Encounter Diagnosis   Name Primary?    Impaired gross motor coordination Yes       Physician: Jude Méndez MD    Visit Date: 4/4/2024    Physician Orders: PT Eval and Treat Neuro Program   Medical Diagnosis from Referral: Anoxic brain injury [G93.1]   Evaluation Date: 2/26/2024  Authorization Period Expiration: 2/18/2025  Plan of Care Expiration: 4/22/24  Progress Note Due: 3/25/24  Date of Surgery: NA  Visit # / Visits authorized: 11/ 20 (12)   FOTO: 1/ 3     Time In: 1603 (Pt arrived late)   Time Out: 1643   Total Billable Time: 40 minutes     Precautions: Standard, Fall, and developmental delay     PTA visit 0/5      Subjective     Pt reports: "Excuse me."     He was not compliant with home exercise program.     Response to previous treatment: good  Functional change: ongoing    Pain: None   Location: NA      Objective      Objective Measures updated at progress report unless specified.     Treatment     Emerson received the treatments listed below:      therapeutic activities to improve functional performance for 40 minutes, including:    Virtual-reality treadmill training with the following set-up and specifications:    Physical therapist performed skilled set-up for use of virtual reality treadmill including: donning of harness with patient in standing, securing all straps for optimal fit of harness.    Results:    Date Trial Duration Speed Distance Obstacles Navigation Level Handrails Score   4/4/24 1 8 min 1.2-2.0 mph 374 yards 10/16  62% None 1 bilateral 432       Left Right Total   Hurdles  1/3 2/3 50%   Puddles 0/2 7/8 70%   Total 20% 81% 62%       Skilled setup and breakdown required. Therapeutic rest break in between trials.     - 3x30" standing bilateral gastroc stretch on half foam roll   - 3x30" single leg stance on right and left with occasional " single upper extremity support   - Agility ladder x2 laps each forward, lateral, in/out at slow pace    (Therapeutic rest breaks as needed)       Home Exercises Provided and Patient Education Provided     Education provided:     - Proper form for all exercises included in today's session.    - Discussed [place] program with pt and mother.     Written Home Exercises Provided: In previous session.   Exercises were reviewed.  Emerson demonstrated fair  understanding of the education provided.     See EMR under Patient Instructions for exercises provided 3/1/2024.    Assessment     Emerson tolerated session well with focus on ambulation on virtual reality treadmill, balance and coordination. Pt demonstrated improvements in heel strike when ambulating at faster pace and safely navigated obstacles on the screen. Also introduced pt to agility floor ladder today. He did well maintaining balance but required increased time to perform stepping in various patterns. He remains motivated to participate.        Emerson Is progressing well towards his goals.   Pt prognosis is Good.     Pt will continue to benefit from skilled outpatient physical therapy to address the deficits listed in the problem list box on initial evaluation, provide pt/family education and to maximize pt's level of independence in the home and community environment.     Pt's spiritual, cultural and educational needs considered and pt agreeable to plan of care and goals.     Anticipated barriers to physical therapy: none at this time.     Goals: Goals:  Short Term Goals: (4 weeks) Date established: Status:    1. Patient will be provided with an individualized home exercise program.  2/26/24 MET   2.  Patient will demonstrate improve endurance and activity tolerance as evidenced by ability to perform Nu-step x 15 minutes without rests breaks with heart rate post-activity equivalent to 50-80% of maximum heart rate.  2/26/24 ongoing         Long Term Goals: (8 weeks) Date  established: Status:    1. Patient will be independent and compliant with updated home exercise program. 2/26/24    ongoing   2.  Patient will increase his gait speed as assessed by the Timed 10 Meter Walk Test from 1.2 m/s to >1.4 m/s to increase his safety and independence with gait at a community level. (Minimal Clinically Important Difference for older adults = 0.13 m/s)    2/26/24    ongoing      3. Patient will improve his score on the Functional Gait Assessment (FGA) from 19/30 to at least 23/30, indicating improved safety and (I) with dynamic, community level gait. (Minimal Detectable Change for older adults= 4 points)  2/26/24 ongoing   4. Patient will maintain bilateral single leg stance for at least 15 seconds to improve standing balance and overall safety with functional tasks.  2/26/24 ongoing   5. Patient will perform 5 sit to stands in < 10 seconds to demonstrate a reduction in fall risk.  2/26/24 ongoing   6. Patient will begin some form of community fitness to begin regular and consistent performance of exercise to continue maintenance of gains made in physical therapy.  2/26 24 ongoing        Plan   Plan of care Certification: 2/26/2024 to 4/22/24.     Outpatient Physical Therapy 2 times weekly for 8 weeks to include the following interventions: Gait Training, Neuromuscular Re-ed, Patient Education, Therapeutic Activities, and Therapeutic Exercise.     Recommendations for next treatment session:     Continue with treadmill training on incline   Balance exercises    Brianna Lindsey, PT

## 2024-04-04 NOTE — PROGRESS NOTES
"[facility] OUTPATIENT THERAPY AND WELLNESS   Occupational Therapy Treatment Note     Name: Allyson Estrada  [facility] Number: [id number]    Therapy Diagnosis:   Encounter Diagnoses   Name Primary?    Decreased strength Yes    Deficits in activities of daily living     Decreased coordination      Physician: Jude Méndez MD    Physician Orders: Eval and Treat  Medical Diagnosis: G93.1 (ICD-10-CM) - Anoxic brain injury   Evaluation Date: 4/4/2024  Insurance Authorization Period Expiration:   Plan of Care Certification Period: 5/28/2024   Progress note due: 4/5/2024, 5/3/2024  Date of Return to MD: To be determined  Visit # / Visits authorized: 7/20 (+eval)  FOTO: 1/ 3, last assessed 3/8/2024    Precautions:  Standard    Time In: 4:50 PM  Time Out: 5:30 PM  Total Billable Time: 40 minutes    Subjective     Pt reports: He states he is fine and hasn't been doing much   he was compliant with home exercise program given last session (practice tying).   Response to previous treatment: no concerns  Functional change: no concerns    Pain: 0/10  Location: N/a    Objective     At 4:53 PM, PQ=249/70, HR=81     Treatment     Emerson received the treatments listed below:     Therapeutic exercises to develop strength and endurance for 10 minutes, including:    -UBE x5 min forward/ 5 min back w/ erika UE, level 3.5 standing to increase erika UE act aung and UB strength, as well as improving cardiovascular fitness. He was encouraged to keep rate of perceived exertion (RPE) at "easy to moderate" (3-4/10). West avg= 20, peak west= 37.  Patient reported it was "medium".     Therapeutic activities to improve functional performance for 30 minutes, including:    See vitals above     Pt completed direction following task that consisted of assembling a burger (stacking game) based on the picture on a card. (7). He was able to verbalize what each piece was (bun, lettuce, tomato, cheese, meat, onion, pickle) with each card with min verbal cues for onion). " "He completed each with 100% accuracy with significantly increased time. Mod verbal cues to increase volume of voice when verbalizing pieces of burger.     Completed initial tie (for shoe tying) x3 on his own with no cueing and x1 with visual demonstration and min a . Increased time required for all.     Neuromuscular re-education activities to improve Balance and Coordination for 0 minutes. The following activities were included:  N/a    Home Exercises and Education Provided     Education provided:   - Handwriting exercises at home   - Progress towards goals   - Cutting food and knife safety  -[place] making at home    Written Home Exercises Provided: Patient instructed to cont prior HEP.  Exercises were reviewed and Emerson was able to demonstrate them prior to the end of the session.  Emerson demonstrated good  understanding of the HEP provided.     See EMR under Patient Instructions for exercises provided 3/8/2024.     Assessment     Emerson tolerated his session very well today. He had no difficulty with increase in UBE and reported it to be "medium" this date. After therapist asked what he could remember with the knot (for shoe tying), he tied it himself with increased time. He went on to tie the knot x1 with visual demonstration and min a and then x2 (I) with increased time. He was 100% accurate with sequencing (burger) task.     Emerson's rehab potential is Good.     Anticipated barriers to occupational therapy: Potentially transportation as patient does not drive.    Patient's spiritual, cultural and educational needs considered and patient is agreeable to the plan of care and goals as stated below:     Goals:  Short Term Goals:    Short Term Goals (6 weeks) Status When Last Assessed  Progressing/ Met   1) Pt will complete HEP with minimal verbal cues from parent  progressing 4/4/2024    2) Handwriting to be assessed and goal added Completed 3/8/2024. See goal below.  Completed 3/8/2024    3) Pt will increase  " strength as evidenced by 5# increase on dynamometer bilaterally R: 46.3#  L: 63.3# progressing 4/4/2024    4) Pt to be able to wash his back in the shower independently with adaptive equipment as needed  progressing 4/4/2024    5) Pts parent to report increased independence with oral hygiene including appropriate amount of toothpaste for task  progressing 4/4/2024    6) Pt will tie his shoes with minimal verbal cues   progressing 4/4/2024    7) Pt will prepare a cold meal from start to finish using appropriately portioned ingredients with minimal verbal cues   progressing 4/4/2024    8) New goal 3/8/2024: Patient will be able to copy 2 sentences from a book without skipping words with 80% legibility.   progressing 4/4/2024         LongTerm Goals (12 weeks) Status When Last Assessed  Progressing/ Met   1) Pt will increase his Box and Blocks score as evidenced by 10 block increase bilaterally to increase gross motor coordination R: 26  L: 21 progressing 4/4/2024    2) Pt will increase fine motor coordination as evidenced by completion of 9 Hole Peg test in 40 seconds or less bilaterally   R: 54s  L: 53s progressing 4/4/2024    3) Pt will increase  strength as evidenced by 10# increase on dynamometer bilaterally R: 46.3#  L: 63.3# progressing 4/4/2024    4) Pt will use butter knife appropriately with no safety concerns during meal preparation 5/5 attempts 4/4 attempts with no safety concerns on 3/28/2024 progressing 4/4/2024    5)  Pt will perform UBD / LBD independently, including buttons, fasteners, tying shoes, and with correct orientation of clothing items   progressing 4/4/2024    6) Pts parent will self report increased efficiency with toilet hygiene   progressing 4/4/2024     Additional functional goals to be added as patient progresses and per his priorities.    Plan   Certification Period/Plan of care expiration: 4/4/2024 to 5/28/2024.    Outpatient Occupational Therapy 2 times weekly for 12 weeks to  include the following interventions: Patient Education, Self Care, Therapeutic Activities, and Therapeutic Exercise.    Updates/Grading for next session: continue handwriting (copying), upgrade UBE, in-hand manipulation,  strength, shoe tying, toilet hygiene, shoulder AROM (for washing his back)    DANNIE Kline   4/4/2024    I certify that I was present in the room directing the student in service delivery and guiding them using my skilled judgment. As the co-signing therapist I have reviewed the students documentation and am responsible for the treatment, assessment, and plan.     Marcelle Sommer MS, OTR/L

## 2024-04-08 ENCOUNTER — CLINICAL SUPPORT (OUTPATIENT)
Dept: REHABILITATION | Facility: HOSPITAL | Age: 19
End: 2024-04-08
Payer: COMMERCIAL

## 2024-04-08 DIAGNOSIS — R47.1 DYSARTHRIA: Primary | ICD-10-CM

## 2024-04-08 PROCEDURE — 92507 TX SP LANG VOICE COMM INDIV: CPT | Mod: PO

## 2024-04-08 NOTE — PROGRESS NOTES
[facility]  Speech Therapy Treatment Note- Neurological Rehabilitation  PROGRESS NOTE  Date: 4/8/2024     Name: Allyson Estrada   MRN: [id number]                                                                            Therapy Diagnosis:   Encounter Diagnosis   Name Primary?    Dysarthria Yes     Physician: Jude Méndez MD  Physician Orders: Ambulatory Referral to Speech Therapy   Medical Diagnosis: Anoxic brain injury [G93.1]     Visit #/ Visits Authorized: 5/ 20  Date of Evaluation:  2/27/2024  Insurance Authorization Period: 2/28/2024 - 12/31/2024   Plan of Care Expiration Date:    4/23/2024  Extended Plan of Care:  not applicable    Progress Note: due 3/27/2024- completed 4/8/2024     Time In:  1605  Time Out:  1645  Total Billable Time: 40     Precautions: Standard and Communication  Subjective:   Patient reports: No major changes from previous session  He was compliant to home exercise program.   Response to previous treatment: good  Pain Scale: no pain indicated throughout session  Objective:         UNTIMED  Speech- Language- Voice Therapy     Short Term Goals: (6 weeks) Current Progress:   Patient will complete RBANS assessment to determine further speech therapy Plan of Care Goal Met 3/4/2024     2. Patient will recall 4/4 clear speech strategies with minimal assist      Progressing/ Not Met 4/8/2024    Worked on loud speech today as the main strategy address (continued)    Patient greeted Speech  Language Pathologist independently with loud voice.   3. Patient will read aloud low level passage with independently use of clear speech strategies with 80% accuracy independently.       Progressing/ Not Met 2024    Not addressed in today's session.    4. Patient will complete diaphragmatic breathing exercises by producing maximal exhalation via diaphragmic breathing with a duration of 5 seconds in 10 trials, consistently with minimal tactile and verbal clinician cues.       Progressing/ Not Met 2024    Unable to produce a breath longer than 3 seconds. Used a straw with a tissue for visual biofeedback- continued this date    Abnormal francine continues, but improves with loud voice   5. Patient will complete ENT assessment for clearance of PhoRTE exercises in speech therapy.       Progressing/ Not Met 2024    Patient has yet to be scheduled with Otolaryngology (ENT)    6. Patient will name objects with 90% accuracy independently.     Progressing/Not Met 2024  Patient named objects x50 when given 2D color picture with 90% accuracy independently, 100% given phonemic cues    Addressed final consonant deletion throughout as appropriate for intelligibility     Improvement with cues, but difficulty with carryover noted for articulation likely due to habit    Patient enagaged in a game of would you rather between preferred foods with naming  them.      Met x1   7. Patient will state the function of objects using 3+ word sentences with minimal cues.     Progressing/Not Met 2024  Completed using moderate cues during naming task average 2 words and need for semantic cues      Patient Education/Response:   Patient educated regarding the followin. Using his loud voice when communicating   2. Scheduling for Otolaryngology (ENT)     Home program established: yes-use of loud voice  Patient verbalized understanding to all above education provided.     See Electronic Medical Record under Patient Instructions  for exercises provided throughout therapy.  Assessment:   Patient with needing significant cues throughout the session for use of loud voice. Query if an EMST would be beneficial for this patient; however, needs full Otolaryngology (ENT) assessment.Otolaryngology (ENT) appointment has yet to be scheduled.  Addressed final consonant deletion throughout as appropriate for intelligibility; though partially may be dialectical and partially breath support related. Breath support addressed but continues to be limited at this time. Minimal progress noted and discharge is recommended at end of Plan of Care.    Emerson is progressing well towards his goals. Current goals remain appropriate. Goals to be updated as necessary.     Patient prognosis is Good. Patient will continue to benefit from skilled outpatient speech and language therapy to address the deficits listed in the problem list on initial evaluation, provide patient/family education and to maximize patient's level of independence in the home and community environment.   Medical necessity is demonstrated by the following IMPAIRMENTS:  Aphasia: decreased content words and significant word finding difficulty in all situations severely limiting functional communication with both familiar and unfamiliar communication partners to relay medically and safety relevant information in a timely manner in a state of emergency.    Motor speech: Decreased speech intelligibility results in decreased efficiency communicating medical and social wants and needs in the case of emergency.    Barriers to Therapy: none identified   Patient's spiritual, cultural and educational needs considered and patient agreeable to plan of care and goals.  Plan:   Continue Plan of Care with focus on rehabilitation and compensation for cognitive communication, dysarthria, and expressive receptive language disorder.    Dayanna Rangel CCC-SLP   4/8/2024

## 2024-04-09 ENCOUNTER — CLINICAL SUPPORT (OUTPATIENT)
Dept: REHABILITATION | Facility: HOSPITAL | Age: 19
End: 2024-04-09
Payer: COMMERCIAL

## 2024-04-09 DIAGNOSIS — Z78.9 DEFICITS IN ACTIVITIES OF DAILY LIVING: ICD-10-CM

## 2024-04-09 DIAGNOSIS — R27.8 IMPAIRED GROSS MOTOR COORDINATION: Primary | ICD-10-CM

## 2024-04-09 DIAGNOSIS — R53.1 DECREASED STRENGTH: Primary | ICD-10-CM

## 2024-04-09 DIAGNOSIS — R27.8 DECREASED COORDINATION: ICD-10-CM

## 2024-04-09 PROCEDURE — 97110 THERAPEUTIC EXERCISES: CPT | Mod: PO

## 2024-04-09 PROCEDURE — 97110 THERAPEUTIC EXERCISES: CPT | Mod: PO,CQ

## 2024-04-09 PROCEDURE — 97530 THERAPEUTIC ACTIVITIES: CPT | Mod: PO,CQ

## 2024-04-09 PROCEDURE — 97530 THERAPEUTIC ACTIVITIES: CPT | Mod: PO

## 2024-04-09 NOTE — PROGRESS NOTES
[facility] [place] [facility]   Physical Therapy Treatment Note      Name: Allyson Estrada  [facility] Number: [id number]      Therapy Diagnosis:   Encounter Diagnosis   Name Primary?    Impaired gross motor coordination Yes     Physician: Jude Méndez MD    Visit Date: 4/9/2024    Physician Orders: PT Eval and Treat Neuro Program   Medical Diagnosis from Referral: Anoxic brain injury [G93.1]   Evaluation Date: 2/26/2024  Authorization Period Expiration: 2/18/2025  Plan of Care Expiration: 4/22/24  Progress Note Due: 3/25/24  Date of Surgery: NA  Visit # / Visits authorized: 12/20 (13)   FOTO: 1/3     Time In: 1600  Time Out: 1640  Total Billable Time: 40 minutes     Precautions: Standard, Fall, and developmental delay     PTA visit 1/5    Subjective     Pt reports: without complaints  He reports was not compliant with home exercise program.   Response to previous treatment: no problems stated  Functional change: ongoing    Pain:0/10   Location: Not Applicable       Objective      Objective Measures updated at progress report unless specified.     Treatment     Emerson received the treatments listed below:      therapeutic exercises to develop strength, endurance and flexibility flexibility for 15 minutes including:    Recumbent bike Level 4 12 minutes     Parallel bars:  Bilateral calf stretch 3x30 seconds on 1/2 roll    (Activity time includes skilled set-up and positioning as well as therapeutic rest)    therapeutic activities to improve functional performance for 25 minutes, including:    Virtual-reality treadmill training with focus on visual motion tolerance during functional activity with the following set-up and specifications:     Skilled set-up for use of virtual reality treadmill including:  cut off switch for safety.     Date Trial Duration Speed Distance Obstacles Scanning Handrails Score   04/09/2024    1 10 min 2.0 mph 581 yards  41% 84% Bilateral   789         Gait without assistive device 120  feet and 150 feet without assistive device     Mini squat on BOSU flat in parallel bars 20 times with Bilateral Upper Extremities support    Parallel bars:  Side step up/over BOSU round 2 minutes Right Left     (Activity time includes skilled set-up and positioning as well as therapeutic rest)    Home Exercises Provided and Patient Education Provided     Education provided:     - Patient provided with verbal and demonstrative instruction for all activities performed in today's session.     Written Home Exercises Provided: Instructed to continue home exercise program.        See EMR under Patient Instructions for exercises provided 3/1/2024.    Assessment     Emerson provided good participation and effort during today's session with treatment focused on lower extremity strengthening and functional mobility to improve ambulation safety. Emerson tolerated activities without complaints and good follow through with instruction for proper form.    Emerson Is progressing towards his goals.   Pt prognosis is Good.     Pt will continue to benefit from skilled outpatient physical therapy to address the deficits listed in the problem list box on initial evaluation, provide pt/family education and to maximize pt's level of independence in the home and community environment.     Pt's spiritual, cultural and educational needs considered and pt agreeable to plan of care and goals.     Anticipated barriers to physical therapy: none at this time.     Goals: Goals:  Short Term Goals: (4 weeks) Date established: Status:    1. Patient will be provided with an individualized home exercise program.  2/26/24 MET   2.  Patient will demonstrate improve endurance and activity tolerance as evidenced by ability to perform Nu-step x 15 minutes without rests breaks with heart rate post-activity equivalent to 50-80% of maximum heart rate.  2/26/24 ongoing         Long Term Goals: (8 weeks) Date established: Status:    1. Patient will be independent and  compliant with updated home exercise program. 2/26/24    ongoing   2.  Patient will increase his gait speed as assessed by the Timed 10 Meter Walk Test from 1.2 m/s to >1.4 m/s to increase his safety and independence with gait at a community level. (Minimal Clinically Important Difference for older adults = 0.13 m/s)    2/26/24    ongoing      3. Patient will improve his score on the Functional Gait Assessment (FGA) from 19/30 to at least 23/30, indicating improved safety and (I) with dynamic, community level gait. (Minimal Detectable Change for older adults= 4 points)  2/26/24 ongoing   4. Patient will maintain bilateral single leg stance for at least 15 seconds to improve standing balance and overall safety with functional tasks.  2/26/24 ongoing   5. Patient will perform 5 sit to stands in < 10 seconds to demonstrate a reduction in fall risk.  2/26/24 ongoing   6. Patient will begin some form of community fitness to begin regular and consistent performance of exercise to continue maintenance of gains made in physical therapy.  2/26 24 ongoing        Plan   Plan of care Certification: 2/26/2024 to 4/22/24.     Outpatient Physical Therapy 2 times weekly for 8 weeks to include the following interventions: Gait Training, Neuromuscular Re-ed, Patient Education, Therapeutic Activities, and Therapeutic Exercise.     Felicitas Tucker, PTA

## 2024-04-09 NOTE — PROGRESS NOTES
"[facility]   Occupational Therapy Treatment Note     Name: Allyson Estrada  [facility] Number: [id number]    Therapy Diagnosis:   Encounter Diagnoses   Name Primary?    Decreased strength Yes    Deficits in activities of daily living     Decreased coordination      Physician: Jude Méndez MD    Physician Orders: Eval and Treat  Medical Diagnosis: G93.1 (ICD-10-CM) - Anoxic brain injury   Evaluation Date: 4/9/2024  Insurance Authorization Period Expiration:   Plan of Care Certification Period: 5/28/2024   Progress note due: 4/5/2024, 5/3/2024  Date of Return to MD: To be determined  Visit # / Visits authorized: 8/20 (+eval)  FOTO: 1/ 3, last assessed 3/8/2024    Precautions:  Standard    Time In: 4:50 PM  Time Out: 5:30 PM  Total Billable Time: 40 minutes    Subjective     Pt reports: He states that PT went well. Pt's mother states that he has been getting better with tying, but is still making a mess while brushing his teeth.     he was compliant with home exercise program given last session (practice tying).   Response to previous treatment: no concerns  Functional change: no concerns    Pain: 0/10  Location: N/a    Objective     At 4:53 PM, WT=529/79, HR=75, SpO2= 97%     Treatment     Emerson received the treatments listed below:     Therapeutic exercises to develop strength and endurance for 10 minutes, including:    -UBE x5 min forward/ 5 min back w/ erika UE, level 3.5 standing to increase erika UE act aung and UB strength, as well as improving cardiovascular fitness. He was encouraged to keep rate of perceived exertion (RPE) at "easy to moderate" (3-4/10). West avg= 21, peak west= 36.  Patient reported it was "medium".     Therapeutic activities to improve functional performance for 30 minutes, including:    See vitals above.    Pt participated in a toothbrushing activity to practice with mess management. He brushed teeth on model mouth for 2 minutes (x2) with little to no mess. He was then " "asked to brush his own teeth at the sink (x4). For the first round, he brushed for 2 minutes. For the remaining rounds, he brushed for 30 seconds. He required mod-max verbal cues to make decisions regarding toothpaste quantity. Education given on how to clean up spills, and to spit his toothpaste into the drain of the sink. Mod verbal cues required to implement education.     Neuromuscular re-education activities to improve Balance and Coordination for 0 minutes. The following activities were included:  N/a    Home Exercises and Education Provided     Education provided:   - Handwriting exercises at home   - Progress towards goals   - Cutting food and knife safety  - [place] making at home  - Mess management during tooth brushing     Written Home Exercises Provided: Patient instructed to cont prior HEP.  Exercises were reviewed and Emerson was able to demonstrate them prior to the end of the session.  Emerson demonstrated good  understanding of the HEP provided.     See EMR under Patient Instructions for exercises provided 3/8/2024.     Assessment     Emerson tolerated his session very well today. He had no difficulty with increase in UBE and reported it to be "medium" this date. No concerns to report with toothbrushing on the model mouth. Mod-max verbal cues were required for mess management during self tooth brushing task at the sink. Education given for mess management when brushing teeth.Cues required to implement.     Emerson's rehab potential is Good.     Anticipated barriers to occupational therapy: Potentially transportation as patient does not drive.    Patient's spiritual, cultural and educational needs considered and patient is agreeable to the plan of care and goals as stated below:     Goals:  Short Term Goals:    Short Term Goals (6 weeks) Status When Last Assessed  Progressing/ Met   1) Pt will complete HEP with minimal verbal cues from parent  progressing 4/9/2024    2) Handwriting to be assessed and goal added " Completed 3/8/2024. See goal below.  Completed 3/8/2024    3) Pt will increase  strength as evidenced by 5# increase on dynamometer bilaterally R: 46.3#  L: 63.3# progressing 4/9/2024    4) Pt to be able to wash his back in the shower independently with adaptive equipment as needed  progressing 4/9/2024    5) Pts parent to report increased independence with oral hygiene including appropriate amount of toothpaste for task  progressing 4/9/2024    6) Pt will tie his shoes with minimal verbal cues   progressing 4/9/2024    7) Pt will prepare a cold meal from start to finish using appropriately portioned ingredients with minimal verbal cues   progressing 4/9/2024    8) New goal 3/8/2024: Patient will be able to copy 2 sentences from a book without skipping words with 80% legibility.   progressing 4/9/2024         LongTerm Goals (12 weeks) Status When Last Assessed  Progressing/ Met   1) Pt will increase his Box and Blocks score as evidenced by 10 block increase bilaterally to increase gross motor coordination R: 26  L: 21 progressing 4/9/2024    2) Pt will increase fine motor coordination as evidenced by completion of 9 Hole Peg test in 40 seconds or less bilaterally   R: 54s  L: 53s progressing 4/9/2024    3) Pt will increase  strength as evidenced by 10# increase on dynamometer bilaterally R: 46.3#  L: 63.3# progressing 4/9/2024    4) Pt will use butter knife appropriately with no safety concerns during meal preparation 5/5 attempts 4/4 attempts with no safety concerns on 3/28/2024 progressing 4/9/2024    5)  Pt will perform UBD / LBD independently, including buttons, fasteners, tying shoes, and with correct orientation of clothing items   progressing 4/9/2024    6) Pts parent will self report increased efficiency with toilet hygiene   progressing 4/9/2024     Additional functional goals to be added as patient progresses and per his priorities.    Plan   Certification Period/Plan of care expiration: 4/9/2024  to 5/28/2024.    Outpatient Occupational Therapy 2 times weekly for 12 weeks to include the following interventions: Patient Education, Self Care, Therapeutic Activities, and Therapeutic Exercise.    Updates/Grading for next session: continue handwriting (copying), upgrade UBE, in-hand manipulation,  strength, shoe tying, toilet hygiene, shoulder AROM (for washing his back)    DANNIE Kline   4/9/2024    I certify that I was present in the room directing the student in service delivery and guiding them using my skilled judgment. As the co-signing therapist I have reviewed the students documentation and am responsible for the treatment, assessment, and plan.     Marcelle Sommer MS, OTR/L

## 2024-04-11 ENCOUNTER — CLINICAL SUPPORT (OUTPATIENT)
Dept: REHABILITATION | Facility: HOSPITAL | Age: 19
End: 2024-04-11
Payer: COMMERCIAL

## 2024-04-11 DIAGNOSIS — R27.8 DECREASED COORDINATION: ICD-10-CM

## 2024-04-11 DIAGNOSIS — Z78.9 DEFICITS IN ACTIVITIES OF DAILY LIVING: ICD-10-CM

## 2024-04-11 DIAGNOSIS — R53.1 DECREASED STRENGTH: Primary | ICD-10-CM

## 2024-04-11 DIAGNOSIS — R27.8 IMPAIRED GROSS MOTOR COORDINATION: Primary | ICD-10-CM

## 2024-04-11 PROCEDURE — 97110 THERAPEUTIC EXERCISES: CPT | Mod: PO

## 2024-04-11 PROCEDURE — 97530 THERAPEUTIC ACTIVITIES: CPT | Mod: PO

## 2024-04-11 NOTE — PROGRESS NOTES
"[facility]   Occupational Therapy Treatment Note     Name: Allyson Estrada  Clinic Number: [id number]    Therapy Diagnosis:   Encounter Diagnoses   Name Primary?    Decreased strength Yes    Deficits in activities of daily living     Decreased coordination      Physician: Jude Méndez MD    Physician Orders: Eval and Treat  Medical Diagnosis: G93.1 (ICD-10-CM) - Anoxic brain injury   Evaluation Date: 4/11/2024  Insurance Authorization Period Expiration:   Plan of Care Certification Period: 5/28/2024   Progress note due: 4/5/2024, 5/3/2024  Date of Return to MD: To be determined  Visit # / Visits authorized: 9/20 (+eval)  FOTO: 1/ 3, last assessed 3/8/2024    Precautions:  Standard    Time In: 4:50 PM  Time Out: 5:30 PM  Total Billable Time: 40 minutes    Subjective     Pt reports: "doing good"  he was compliant with home exercise program given last session (practice tying).   Response to previous treatment: no concerns  Functional change: no concerns    Pain: 0/10  Location: N/a    Objective     At 4:53 PM, MG=871/78, HR=64, SpO2= 98%     Completed the Kettle Test, a cognitive functional screening test (Jean [employer] Lee & Yue, 2005). Pt was asked to prepare two different cups of hot beverage for self and therapist.     Scoring of Task Performance   Step Score (0-4) Comments   1. Opening the water faucet 1 Slow to initiate for faucet control on floor   2. Filling the kettle with about 2 cups of water 1 Hesitancy   3. Turning off the faucet 1 Hesitancy for faucet control on floor   4. Assembling the kettle 2 Pt attempted to place kettle onto base before unwinding the cord and plugging it in.    5. Attaching the electric cord to the kettle 2 OTS gave general cues to locate the plug for the kettle.    6. Plugging the electric cord in an electric socket 3 He was able to plug the base into the wall after instructions to turn the base over and look at "picture 4" in the directions.    7. Turning " "on the kettle 2 Emerson was able to find the "on" lever on his own after reading the instructions. He looked to OTS for confirmation that he was on the correct step. OTS nodded (general cue), and pt then pressed the lever to turn the kettle on.   8. Assembling the ingredients 3 Several general cues lead to specific cues to select mugs and remember/ select correct ingredients   9. Putting the ingredients into the cups 3 Pt required several attempts to cut open the coffee packet. Specific cue required to open packet all the way. He also placed the entire tea bag into the tea cup (including the string)   10. Picking up the kettle when water boils 2 Pt demonstrated this with hesitancy, then placed back down on the base, although he performed at the appropriate time. He required confirmation that he was correct.   11. Pouring the water into the cups 4 Pt stated that pouring the hot liquid made him nervous, and requested this step to be performed by OTS. He was able to indicate appropriate amount of water to be filled into each cup, as well as indicate that the kettle should be placed back on the base after the water was poured.    12. Adding milk 3 Specific cue required to remember the milk, and general cue to find the milk on the tray. He was able to pour the appropriate amount into the correct cup.   13. Indication of task completion (e.g. verbal, gesture, serving) 3 Pt required specific cueing to indicate that the task was finished.    Total Score (0-52 with lower score =better performance) 30 Pt presented with hesitancy and increased time throughout assessment (first time making a hot beverage). Pt to be assessed again in future session.   0=Intact performance  1=Slow &/or trial & error &/or questionable performance, but completes independently  2=Received general cues  3=Received specific cueing  4=Received physical demonstration or assistance    Description of process by therapist:  Increased time to determine drink " "choice. Required repeated drink choices. He chose coffee with milk. OTS chose tea with sugar. He was given a general cue "what should you do first?" He picked up the kettle and filled it with water, requiring no cues to operate the sink or open the kettle lid. Pt attempted to place kettle onto heater before plugging it in. OTS gave general cues to locate the plug for the kettle. He was then able to plug the base into the wall after instructions to turn the base over and look at "picture 4" in the directions. Pt continued to look at OTS after performing each step, OTS continued to give general cues, "now what?" Pt was able to read kettle instructions with specific cue for interpretation from OTS to wait until the light on the kettle turns off when the water has boiled.  General cue to select cups, "what do you need to make a drink", "how many drinks are you supposed to make" (only selected one cup). "Who is getting a drink?" Then he selected the second cup. General cues given to remember which type of drinks were to be made. He was asked to look around the trays to see if he could remember which types of drinks were agreed upon. Specific cue given to select coffee and tea. He was encouraged to  the packages and read instructions and problem solve his next steps. Cues given, "you selected the right box, what should you do now?" Required general cue to pour water into cups. Patient was nervous about pouring hot liquid into cups and stated that he did not want to do it, so OTS performed. Specific cue given to get a tea bag (OTS did not specify type, and pt did not ask. He selected one at random). He put entire tea bag (including string) into the cup of water. General cue given "what else was supposed to go in the tea?" Specific cue given "did I ask for honey, milk, or sugar in my tea?" - OTS gave specific cue, "sugar." He was able to open sugar packet and pour sugar into tea. General cue given, "what was the " "other drink you needed to make?" He answered correctly, stating "coffee." He was told to locate the coffee. He required verbal encouragement to look around the trays to locate the coffee. He attempted to  two tea packets. OTS offered a choice, "which one is coffee?" Between coffee and tea. He correctly chose the coffee. OTS offered scissors to open the instant coffee packet. He required many trials of turning the packet over and cutting through the coffee packet. He was able to cut a small hole into the packet and was given a specific cue to tear the packet open all the way. He was able to pour the contents into the appropriate cup (he dropped the packet into the cup, then threw it away. He required a specific cue to select milk for his coffee. He required a general cue to look around the trays for the milk. He was able to identify and pour an appropriate amount into the cup. General cue given to stir the coffee. Specific cue required to stir the tea (needed reminding that he poured sugar into the tea). Specific cue required to indicate that he was finished.       Rating of performance by client from 0-100%.  90%    Rating of difficulty (easy to very difficult):  "Medium"    Additional Comments:  Pt stated that he had never made a hot beverage of any type before and that this was his first time seeing a kettle. He was able to problem solve some of the task, but required many general and specific cues for a majority of the time for initiation of steps. He required full demonstration of pouring hot water into the cups, but was able to indicate the appropriate level of liquid to be poured into each cup. Pt to be given kettle test in future session to assess for carryover.      Treatment     Emerson received the treatments listed below:     Therapeutic exercises to develop strength and endurance for 0 minutes, including:    Therapeutic activities to improve functional performance for 40 minutes, including:    See " vitals above.    See objective measures above.    Neuromuscular re-education activities to improve Balance and Coordination for 0 minutes. The following activities were included:  N/a    Home Exercises and Education Provided     Education provided:   - Handwriting exercises at home   - Progress towards goals   - Cutting food and knife safety  -[place] making at home    Written Home Exercises Provided: Patient instructed to cont prior HEP.  Exercises were reviewed and Emerson was able to demonstrate them prior to the end of the session.  Emerson demonstrated good  understanding of the HEP provided.     See EMR under Patient Instructions for exercises provided 3/8/2024.     Assessment     Emerson participated very well in his session today. He completed the Kettle Test with a score of 30/52. He was able to operate the sink and fill the kettle with little difficulty noted. As he had never made a hot beverage or used a kettle before, most of the verbal cues required were for initiation of steps. He also required general and specific cues to remember which ingredients were pre-agreed upon at the start of the task.  OTS was required to pour the hot water into each cup as requested by Emerson. He was however, able to indicate the appropriate amount of liquid to be poured into each cup and to place the kettle back on the base. He required specific cueing to realize that the task had been completed.     Emerson's rehab potential is Good.     Anticipated barriers to occupational therapy: Potentially transportation as patient does not drive.    Patient's spiritual, cultural and educational needs considered and patient is agreeable to the plan of care and goals as stated below:     Goals:  Short Term Goals:    Short Term Goals (6 weeks) Status When Last Assessed  Progressing/ Met   1) Pt will complete HEP with minimal verbal cues from parent  progressing 4/11/2024    2) Handwriting to be assessed and goal added Completed 3/8/2024. See goal  below.  Completed 3/8/2024    3) Pt will increase  strength as evidenced by 5# increase on dynamometer bilaterally R: 46.3#  L: 63.3# progressing 4/11/2024    4) Pt to be able to wash his back in the shower independently with adaptive equipment as needed  progressing 4/11/2024    5) Pts parent to report increased independence with oral hygiene including appropriate amount of toothpaste for task  progressing 4/11/2024    6) Pt will tie his shoes with minimal verbal cues   progressing 4/11/2024    7) Pt will prepare a cold meal from start to finish using appropriately portioned ingredients with minimal verbal cues   progressing 4/11/2024    8) New goal 3/8/2024: Patient will be able to copy 2 sentences from a book without skipping words with 80% legibility.   progressing 4/11/2024         LongTerm Goals (12 weeks) Status When Last Assessed  Progressing/ Met   1) Pt will increase his Box and Blocks score as evidenced by 10 block increase bilaterally to increase gross motor coordination R: 26  L: 21 progressing 4/11/2024    2) Pt will increase fine motor coordination as evidenced by completion of 9 Hole Peg test in 40 seconds or less bilaterally   R: 54s  L: 53s progressing 4/11/2024    3) Pt will increase  strength as evidenced by 10# increase on dynamometer bilaterally R: 46.3#  L: 63.3# progressing 4/11/2024    4) Pt will use butter knife appropriately with no safety concerns during meal preparation 5/5 attempts 4/4 attempts with no safety concerns on 3/28/2024 progressing 4/11/2024    5)  Pt will perform UBD / LBD independently, including buttons, fasteners, tying shoes, and with correct orientation of clothing items   progressing 4/11/2024    6) Pts parent will self report increased efficiency with toilet hygiene   progressing 4/11/2024     Additional functional goals to be added as patient progresses and per his priorities.    Plan   Certification Period/Plan of care expiration: 4/11/2024 to  5/28/2024.    Outpatient Occupational Therapy 2 times weekly for 12 weeks to include the following interventions: Patient Education, Self Care, Therapeutic Activities, and Therapeutic Exercise.    Updates/Grading for next session: continue handwriting (copying), upgrade UBE, in-hand manipulation,  strength, shoe tying, toilet hygiene, shoulder AROM (for washing his back)    DANNIE Kline   4/11/2024    I certify that I was present in the room directing the student in service delivery and guiding them using my skilled judgment. As the co-signing therapist I have reviewed the students documentation and am responsible for the treatment, assessment, and plan.     Marcelle Sommer MS, OTR/L

## 2024-04-11 NOTE — PROGRESS NOTES
"  [facility]   Physical Therapy Treatment Note      Name: Allyson Estrada  [facility] Number: [id number]      Therapy Diagnosis:   Encounter Diagnosis   Name Primary?    Impaired gross motor coordination Yes       Physician: Jued Méndez MD    Visit Date: 4/11/2024    Physician Orders: PT Eval and Treat Neuro Program   Medical Diagnosis from Referral: Anoxic brain injury [G93.1]   Evaluation Date: 2/26/2024  Authorization Period Expiration: 2/18/2025  Plan of Care Expiration: 4/22/24  Progress Note Due: 3/25/24  Date of Surgery: NA  Visit # / Visits authorized: 13/20 (14)   FOTO: 1/3     Time In: 1603  Time Out: 1643  Total Billable Time: 43 minutes     Precautions: Standard, Fall, and developmental delay     PTA visit 1/5    Subjective     Pt reports: "I think I need to be a little bit closer."     He reports was not compliant with home exercise program.    Response to previous treatment: no problems stated  Functional change: ongoing    Pain: 0/10   Location: Not Applicable       Objective      Objective Measures updated at progress report unless specified.     Treatment     Emerson received the treatments listed below:      therapeutic exercises to develop strength, endurance and flexibility flexibility for 10 minutes including:    Recumbent bike Level 4 10 minutes - improved pedal stroke compared to previous sessions    therapeutic activities to improve functional performance for 33 minutes, including:    Parallel bars  Bilateral calf stretch 3x30 seconds on 1/2 foam roll  2x30" right and left single leg stance   1x15 right and left step ups to 6" step with single upper extremity support  1x15 lateral step up/over 6" step with single upper extremity support     Agility ladder x2 laps each forward, lateral, in/out, diagonal at slow pace     Ambulation 2x120 feet with supervision      (Activity time includes skilled set-up and positioning as well as therapeutic rest)    Home Exercises " Provided and Patient Education Provided     Education provided:     - Patient provided with verbal and demonstrative instruction for all activities performed in today's session.     Written Home Exercises Provided: Instructed to continue home exercise program.      See EMR under Patient Instructions for exercises provided 3/1/2024.    Assessment     Emerson provided good participation and effort during today's session with treatment focused on functional mobility and coordination. Emerson demonstrated improvements in pedal stroke on recumbent bike today and required minimal cues to keep going. He also demonstrated improvements with step sequencing on agility ladder. Emerson tolerated activities without complaints and good follow through with instruction for proper form.    Emerson Is progressing towards his goals.   Pt prognosis is Good.     Pt will continue to benefit from skilled outpatient physical therapy to address the deficits listed in the problem list box on initial evaluation, provide pt/family education and to maximize pt's level of independence in the home and community environment.     Pt's spiritual, cultural and educational needs considered and pt agreeable to plan of care and goals.     Anticipated barriers to physical therapy: none at this time.     Goals: Goals:  Short Term Goals: (4 weeks) Date established: Status:    1. Patient will be provided with an individualized home exercise program.  2/26/24 MET   2.  Patient will demonstrate improve endurance and activity tolerance as evidenced by ability to perform Nu-step x 15 minutes without rests breaks with heart rate post-activity equivalent to 50-80% of maximum heart rate.  2/26/24 ongoing         Long Term Goals: (8 weeks) Date established: Status:    1. Patient will be independent and compliant with updated home exercise program. 2/26/24    ongoing   2.  Patient will increase his gait speed as assessed by the Timed 10 Meter Walk Test from 1.2 m/s to >1.4 m/s  to increase his safety and independence with gait at a community level. (Minimal Clinically Important Difference for older adults = 0.13 m/s)    2/26/24    ongoing      3. Patient will improve his score on the Functional Gait Assessment (FGA) from 19/30 to at least 23/30, indicating improved safety and (I) with dynamic, community level gait. (Minimal Detectable Change for older adults= 4 points)  2/26/24 ongoing   4. Patient will maintain bilateral single leg stance for at least 15 seconds to improve standing balance and overall safety with functional tasks.  2/26/24 ongoing   5. Patient will perform 5 sit to stands in < 10 seconds to demonstrate a reduction in fall risk.  2/26/24 ongoing   6. Patient will begin some form of community fitness to begin regular and consistent performance of exercise to continue maintenance of gains made in physical therapy.  2/26 24 ongoing        Plan   Plan of care Certification: 2/26/2024 to 4/22/24.     Outpatient Physical Therapy 2 times weekly for 8 weeks to include the following interventions: Gait Training, Neuromuscular Re-ed, Patient Education, Therapeutic Activities, and Therapeutic Exercise.     Brianna Lindsey, PT

## 2024-04-16 ENCOUNTER — CLINICAL SUPPORT (OUTPATIENT)
Dept: REHABILITATION | Facility: HOSPITAL | Age: 19
End: 2024-04-16
Payer: COMMERCIAL

## 2024-04-16 DIAGNOSIS — R53.1 DECREASED STRENGTH: Primary | ICD-10-CM

## 2024-04-16 DIAGNOSIS — R27.8 IMPAIRED GROSS MOTOR COORDINATION: Primary | ICD-10-CM

## 2024-04-16 DIAGNOSIS — R27.8 DECREASED COORDINATION: ICD-10-CM

## 2024-04-16 DIAGNOSIS — Z78.9 DEFICITS IN ACTIVITIES OF DAILY LIVING: ICD-10-CM

## 2024-04-16 PROCEDURE — 97110 THERAPEUTIC EXERCISES: CPT | Mod: PO,CQ

## 2024-04-16 PROCEDURE — 97530 THERAPEUTIC ACTIVITIES: CPT | Mod: PO

## 2024-04-16 PROCEDURE — 97530 THERAPEUTIC ACTIVITIES: CPT | Mod: PO,CQ

## 2024-04-16 PROCEDURE — 97110 THERAPEUTIC EXERCISES: CPT | Mod: PO

## 2024-04-16 NOTE — PROGRESS NOTES
[facility]   Physical Therapy Treatment Note      Name: Allyson Estrada  [facility] Number: [id number]      Therapy Diagnosis:   Encounter Diagnosis   Name Primary?    Impaired gross motor coordination Yes     Physician: Jude Méndez MD    Visit Date: 4/16/2024    Physician Orders: PT Eval and Treat Neuro Program   Medical Diagnosis from Referral: Anoxic brain injury [G93.1]   Evaluation Date: 2/26/2024  Authorization Period Expiration: 2/18/2025  Plan of Care Expiration: 4/22/24  Progress Note Due: 3/25/24  Date of Surgery: NA  Visit # / Visits authorized: 12/20 (13)   FOTO: 1/3     Time In: 1600  Time Out: 1639  Total Billable Time: 39 minutes     Precautions: Standard, Fall, and developmental delay     PTA visit 1/5    Subjective     Pt reports: without complaints  He reports was not compliant with home exercise program.   Response to previous treatment: no problems stated  Functional change: ongoing    Pain:0/10   Location: Not Applicable       Objective      Objective Measures updated at progress report unless specified.     Treatment     Emerson received the treatments listed below:      therapeutic exercises to develop strength, endurance and flexibility flexibility for 13 minutes including:    Recumbent bike Level 4 to 6.5 (self selected) 10 minutes     Parallel bars:  Bilateral calf stretch 3x30 seconds on 1/2 roll    (Activity time includes skilled set-up and positioning as well as therapeutic rest)    therapeutic activities to improve functional performance for 26 minutes, including:    Virtual-reality treadmill training with focus on visual motion tolerance during functional activity with the following set-up and specifications:     Skilled set-up for use of virtual reality treadmill including:  cut off switch for safety.     Date Trial Duration Speed Distance Obstacles Scanning Handrails Score   04/16/2024    1 12 min 1.8 mph 628 yards  56% 100% Bilateral   1031         Gait  "without assistive device 120 feet and 150 feet without assistive device     Parallel bars:  Single leg stand 3x30 seconds Right Left with occasional upper extremity support  Mini squat on BOSU flat in parallel bars 20 times with Bilateral Upper Extremities support    (Activity time includes skilled set-up and positioning as well as therapeutic rest)    Home Exercises Provided and Patient Education Provided     Education provided:     - Patient provided with verbal and demonstrative instruction for all activities performed in today's session.     Written Home Exercises Provided: Instructed to continue home exercise program.        See EMR under Patient Instructions for exercises provided 3/1/2024.    Assessment     Emerson provided good participation and effort during today's session with treatment focused on lower extremity strengthening and functional mobility to improve ambulation safety. Emerson tolerated activities without complaints, stated Treadmill at 2.0 miles per hour and Emerson stated "maybe too fast", speed reduced to 1.8.    Emerson Is progressing towards his goals.   Pt prognosis is Good.     Pt will continue to benefit from skilled outpatient physical therapy to address the deficits listed in the problem list box on initial evaluation, provide pt/family education and to maximize pt's level of independence in the home and community environment.     Pt's spiritual, cultural and educational needs considered and pt agreeable to plan of care and goals.     Anticipated barriers to physical therapy: none at this time.     Goals: Goals:  Short Term Goals: (4 weeks) Date established: Status:    1. Patient will be provided with an individualized home exercise program.  2/26/24 MET   2.  Patient will demonstrate improve endurance and activity tolerance as evidenced by ability to perform Nu-step x 15 minutes without rests breaks with heart rate post-activity equivalent to 50-80% of maximum heart rate.  2/26/24 progressing    "      Long Term Goals: (8 weeks) Date established: Status:    1. Patient will be independent and compliant with updated home exercise program. 2/26/24    ongoing   2.  Patient will increase his gait speed as assessed by the Timed 10 Meter Walk Test from 1.2 m/s to >1.4 m/s to increase his safety and independence with gait at a community level. (Minimal Clinically Important Difference for older adults = 0.13 m/s)    2/26/24    ongoing      3. Patient will improve his score on the Functional Gait Assessment (FGA) from 19/30 to at least 23/30, indicating improved safety and (I) with dynamic, community level gait. (Minimal Detectable Change for older adults= 4 points)  2/26/24 ongoing   4. Patient will maintain bilateral single leg stance for at least 15 seconds to improve standing balance and overall safety with functional tasks.  2/26/24 ongoing   5. Patient will perform 5 sit to stands in < 10 seconds to demonstrate a reduction in fall risk.  2/26/24 ongoing   6. Patient will begin some form of community fitness to begin regular and consistent performance of exercise to continue maintenance of gains made in physical therapy.  2/26 24 ongoing        Plan   Plan of care Certification: 2/26/2024 to 4/22/24.     Outpatient Physical Therapy 2 times weekly for 8 weeks to include the following interventions: Gait Training, Neuromuscular Re-ed, Patient Education, Therapeutic Activities, and Therapeutic Exercise.     Felicitas Tucker, PTA

## 2024-04-16 NOTE — PROGRESS NOTES
"[facility]   Occupational Therapy Treatment Note     Name: Allyson Estrada  [facility] Number: [id number]    Therapy Diagnosis:   Encounter Diagnoses   Name Primary?    Decreased strength Yes    Deficits in activities of daily living     Decreased coordination      Physician: Jude Méndez MD    Physician Orders: Eval and Treat  Medical Diagnosis: G93.1 (ICD-10-CM) - Anoxic brain injury   Evaluation Date: 4/16/2024  Insurance Authorization Period Expiration:   Plan of Care Certification Period: 5/28/2024   Progress note due: 4/5/2024, 5/3/2024  Date of Return to MD: To be determined  Visit # / Visits authorized: 10/20 (+eval)  FOTO: 1/ 3, last assessed 3/8/2024    Precautions:  Standard    Time In: 4:40 PM  Time Out: 5:25 PM  Total Billable Time: 45 minutes    Subjective     Pt reports: Pt reports he is doing good, nothing new.   he was compliant with home exercise program given last session (practice tying).   Response to previous treatment: no concerns  Functional change: no concerns    Pain: 0/10  Location: N/a    Objective     At 4:42 PM, UV=140/75, DU=773, SpO2= 97%     FOTO= 49/100  Treatment     Emerson received the treatments listed below:     Therapeutic exercises to develop strength and endurance for 10 minutes, including:    -UBE x5 min forward/ 5 min back w/ erika UE, level 3.6, standing to increase erika UE act aung and UB strength, as well as improving cardiovascular fitness. He was encouraged to keep rate of perceived exertion (RPE) at "easy to moderate" (3-4/10). West avg=20, peak west=32. Pt reported, "medium".     Therapeutic activities to improve functional performance for 35 minutes, including:    See vitals above.    FOTO completed    Pt participated in a toilet hygiene simulation activity sitting on a stool. He was instructed leave a few inches of room behind his bottom on the stool. OTS placed one card on the space left on the stool behind his bottom and he was instructed to " "flip the card over (x3). OTS added an additional card and pt flipped both cards over, (x3) each. One more additional card was added to the stool and he was able to flip all 3 cards over (x3) each.     Pt participated in a tying activity while seated at the edge of the mat using a coordination board on a table top. He was instructed to tie the first knot used in shoe tying. He was able to do so (x10) with increased time and min-mod verbal cues. He was able to perform the tie (x1) with no cues. He was then educated on how to form "bunny ears." He was able to form the "bunny ears" (x1) with demonstration by OTS, significantly increased time, min a, and max verbal cues    Neuromuscular re-education activities to improve Balance and Coordination for 0 minutes. The following activities were included:  N/a    Home Exercises and Education Provided     Education provided:   - Handwriting exercises at home   - Progress towards goals   - Cutting food and knife safety  -[place] making at home    Written Home Exercises Provided: Patient instructed to cont prior HEP.  Exercises were reviewed and Emerson was able to demonstrate them prior to the end of the session.  Emerson demonstrated good  understanding of the HEP provided.     See EMR under Patient Instructions for exercises provided 3/8/2024.     Assessment     Emerson participated well in his session today. Pt reported UBE "medium" today. No issues with toilet hygiene activity. Tying activity with improved performance compared to previous session. He was able to perform ties (x10) with min to mod verbal cues throughout, performing (x1) with no cues. He was educated on forming "bunny ears" and was able to complete (x1) with significantly increased time, max verbal cues, min a, and demonstration by OTS.    Emerson's rehab potential is Good.     Anticipated barriers to occupational therapy: Potentially transportation as patient does not drive.    Patient's spiritual, cultural and " educational needs considered and patient is agreeable to the plan of care and goals as stated below:     Goals:  Short Term Goals:    Short Term Goals (6 weeks) Status When Last Assessed  Progressing/ Met   1) Pt will complete HEP with minimal verbal cues from parent  progressing 4/16/2024    2) Handwriting to be assessed and goal added Completed 3/8/2024. See goal below.  Completed 3/8/2024    3) Pt will increase  strength as evidenced by 5# increase on dynamometer bilaterally R: 46.3#  L: 63.3# progressing 4/16/2024    4) Pt to be able to wash his back in the shower independently with adaptive equipment as needed  progressing 4/16/2024    5) Pts parent to report increased independence with oral hygiene including appropriate amount of toothpaste for task  progressing 4/16/2024    6) Pt will tie his shoes with minimal verbal cues   progressing 4/16/2024    7) Pt will prepare a cold meal from start to finish using appropriately portioned ingredients with minimal verbal cues   progressing 4/16/2024    8) New goal 3/8/2024: Patient will be able to copy 2 sentences from a book without skipping words with 80% legibility.   progressing 4/16/2024         LongTerm Goals (12 weeks) Status When Last Assessed  Progressing/ Met   1) Pt will increase his Box and Blocks score as evidenced by 10 block increase bilaterally to increase gross motor coordination R: 26  L: 21 progressing 4/16/2024    2) Pt will increase fine motor coordination as evidenced by completion of 9 Hole Peg test in 40 seconds or less bilaterally   R: 54s  L: 53s progressing 4/16/2024    3) Pt will increase  strength as evidenced by 10# increase on dynamometer bilaterally R: 46.3#  L: 63.3# progressing 4/16/2024    4) Pt will use butter knife appropriately with no safety concerns during meal preparation 5/5 attempts 4/4 attempts with no safety concerns on 3/28/2024 progressing 4/16/2024    5)  Pt will perform UBD / LBD independently, including buttons,  fasteners, tying shoes, and with correct orientation of clothing items   progressing 4/16/2024    6) Pts parent will self report increased efficiency with toilet hygiene   progressing 4/16/2024     Additional functional goals to be added as patient progresses and per his priorities.    Plan   Certification Period/Plan of care expiration: 4/16/2024 to 5/28/2024.    Outpatient Occupational Therapy 2 times weekly for 12 weeks to include the following interventions: Patient Education, Self Care, Therapeutic Activities, and Therapeutic Exercise.    Updates/Grading for next session: continue handwriting (copying), upgrade UBE, in-hand manipulation,  strength, shoe tying, toilet hygiene, shoulder AROM (for washing his back), visual perception assessment    DANNIE Kline   4/16/2024    I certify that I was present in the room directing the student in service delivery and guiding them using my skilled judgment. As the co-signing therapist I have reviewed the students documentation and am responsible for the treatment, assessment, and plan.     Marcelle Sommer MS, OTR/L

## 2024-04-17 ENCOUNTER — CLINICAL SUPPORT (OUTPATIENT)
Dept: REHABILITATION | Facility: HOSPITAL | Age: 19
End: 2024-04-17
Payer: COMMERCIAL

## 2024-04-17 DIAGNOSIS — R47.1 DYSARTHRIA: Primary | ICD-10-CM

## 2024-04-17 PROCEDURE — 92507 TX SP LANG VOICE COMM INDIV: CPT | Mod: PO

## 2024-04-17 NOTE — PROGRESS NOTES
"  [facility]   Physical Therapy Treatment Note      Name: Allyson Estrada  [facility] Number: [id number]      Therapy Diagnosis:   Encounter Diagnosis   Name Primary?    Impaired gross motor coordination Yes       Physician: Jude Méndez MD    Visit Date: 4/18/2024    Physician Orders: PT Eval and Treat Neuro Program   Medical Diagnosis from Referral: Anoxic brain injury [G93.1]   Evaluation Date: 2/26/2024  Authorization Period Expiration: 2/18/2025  Plan of Care Expiration: 4/22/24  Progress Note Due: 3/25/24  Date of Surgery: NA  Visit # / Visits authorized: 15/20 (16)  FOTO: 1/3     Time In: 1603   Time Out: 1642  Total Billable Time: 39 minutes       Precautions: Standard, Fall, and developmental delay     PTA visit 0/5    Subjective     Pt reports: without complaints  He reports was not compliant with home exercise program.   Response to previous treatment: no problems stated  Functional change: ongoing    Pain: 0/10   Location: Not Applicable       Objective      Objective Measures updated at progress report unless specified.     Treatment     Emerson received the treatments listed below:      therapeutic exercises to develop strength, endurance and flexibility flexibility for 10 minutes including:    Recumbent bike Level 5 x 10 minutes     Therapeutic activities to improve functional performance for 29 minutes, including:     Parallel bars  Bilateral calf stretch 3x30 seconds on 1/2 foam roll  2x30" right and left single leg stance   x60" right and left toe taps to three colored dots placed in front, beside and behind patient  1x15 right and left step ups to 6" step with single upper extremity support  1x15 lateral step up/over 6" step with single upper extremity support      Agility ladder x2 laps each forward, lateral, in/out, diagonal at slow pace      Ambulation 2x120 feet with supervision      Home Exercises Provided and Patient Education Provided     Education provided:     - " Patient provided with verbal and demonstrative instruction for all activities performed in today's session.     Written Home Exercises Provided: Instructed to continue home exercise program.        See EMR under Patient Instructions for exercises provided 3/1/2024.    Assessment     Emerson provided good participation and effort during today's session with treatment focused on functional mobility and coordination. Emerson tolerated increased load on recumbent bike today. He continues to demonstrate improvements with step sequencing on agility ladder. Emerson tolerated activities without complaints and good follow through with instruction for proper form.     Emerson Is progressing towards his goals.   Pt prognosis is Good.     Pt will continue to benefit from skilled outpatient physical therapy to address the deficits listed in the problem list box on initial evaluation, provide pt/family education and to maximize pt's level of independence in the home and community environment.     Pt's spiritual, cultural and educational needs considered and pt agreeable to plan of care and goals.     Anticipated barriers to physical therapy: none at this time.     Goals: Goals:  Short Term Goals: (4 weeks) Date established: Status:    1. Patient will be provided with an individualized home exercise program.  2/26/24 MET   2.  Patient will demonstrate improve endurance and activity tolerance as evidenced by ability to perform Nu-step x 15 minutes without rests breaks with heart rate post-activity equivalent to 50-80% of maximum heart rate.  2/26/24 progressing         Long Term Goals: (8 weeks) Date established: Status:    1. Patient will be independent and compliant with updated home exercise program. 2/26/24    ongoing   2.  Patient will increase his gait speed as assessed by the Timed 10 Meter Walk Test from 1.2 m/s to >1.4 m/s to increase his safety and independence with gait at a community level. (Minimal Clinically Important Difference  for older adults = 0.13 m/s)    2/26/24    ongoing      3. Patient will improve his score on the Functional Gait Assessment (FGA) from 19/30 to at least 23/30, indicating improved safety and (I) with dynamic, community level gait. (Minimal Detectable Change for older adults= 4 points)  2/26/24 ongoing   4. Patient will maintain bilateral single leg stance for at least 15 seconds to improve standing balance and overall safety with functional tasks.  2/26/24 ongoing   5. Patient will perform 5 sit to stands in < 10 seconds to demonstrate a reduction in fall risk.  2/26/24 ongoing   6. Patient will begin some form of community fitness to begin regular and consistent performance of exercise to continue maintenance of gains made in physical therapy.  2/26 24 ongoing        Plan   Plan of care Certification: 2/26/2024 to 4/22/24.     Outpatient Physical Therapy 2 times weekly for 8 weeks to include the following interventions: Gait Training, Neuromuscular Re-ed, Patient Education, Therapeutic Activities, and Therapeutic Exercise.     Brianna Lindsey, PT

## 2024-04-17 NOTE — PROGRESS NOTES
[facility]  Speech Therapy Treatment Note- Neurological Rehabilitation    Date: 4/17/2024     Name: Allyson Estrada   MRN: [id number]                                                                            Therapy Diagnosis:   Encounter Diagnosis   Name Primary?    Dysarthria Yes     Physician: Jude Méndez MD  Physician Orders: Ambulatory Referral to Speech Therapy   Medical Diagnosis: Anoxic brain injury [G93.1]     Visit #/ Visits Authorized: 5/ 20  Date of Evaluation:  2/27/2024  Insurance Authorization Period: 2/28/2024 - 12/31/2024   Plan of Care Expiration Date:    4/23/2024  Extended Plan of Care:  not applicable    Progress Note: due 4/23/2024    Time In:  1605  Time Out:  1645  Total Billable Time: 40     Precautions: Standard and Communication  Subjective:   Patient reports: No major changes from previous session  He was compliant to home exercise program.   Response to previous treatment: good  Pain Scale: no pain indicated throughout session  Objective:         UNTIMED  Speech- Language- Voice Therapy     Short Term Goals: (6 weeks) Current Progress:   Patient will complete RBANS assessment to determine further speech therapy Plan of Care Goal Met 3/4/2024     2. Patient will recall 4/4 clear speech strategies with minimal assist      Progressing/ Not Met 4/17/2024    Worked on loud speech today as the main strategy address (continued)    Needed cues for increasing loudness throughout the session       3. Patient will read aloud low level passage with independently use of clear speech strategies with 80% accuracy independently.       Progressing/ Not Met 2024    Not addressed in today's session.    4. Patient will complete diaphragmatic breathing exercises by producing maximal exhalation via diaphragmic breathing with a duration of 5 seconds in 10 trials, consistently with minimal tactile and verbal clinician cues.       Progressing/ Not Met 2024    Unable to produce a breath longer than 3 seconds. Used a straw with a tissue for visual biofeedback- continued this date    Abnormal francine continues, but improves with loud voice   5. Patient will complete ENT assessment for clearance of PhoRTE exercises in speech therapy.       Progressing/ Not Met 2024    Patient has yet to be scheduled with Otolaryngology (ENT)    6. Patient will name objects with 90% accuracy independently.  Patient named objects x50 when given 2D color picture with 90% accuracy independently, 100% given phonemic cues    Addressed final consonant deletion throughout as appropriate for intelligibility     Improvement with cues, but difficulty with carryover noted for articulation likely due to habit    Goal Met 2024     7. Patient will state the function of objects using 3+ word sentences with minimal cues.     Progressing/Not Met 2024  Completed using moderate to maximum cues (informal use of Combined Aphasia and Apraxia of Speech Treatment for sentence expansion) average 4 words with cues     Extended processing time required.      Patient Education/Response:   Patient educated regarding the followin. Using his loud voice when communicating   2. Scheduling for Otolaryngology (ENT)     Home program established: yes-use of loud voice  Patient verbalized understanding to all above education provided.     See Electronic Medical Record under Patient Instructions for exercises provided throughout  therapy.  Assessment:   Patient with needing significant cues throughout the session for use of loud voice. Query if an EMST would be beneficial for this patient; however, needs full Otolaryngology (ENT) assessment.Otolaryngology (ENT) appointment has yet to be scheduled.  Addressed final consonant deletion throughout as appropriate for intelligibility; though partially may be dialectical and partially breath support related. Breath support addressed but continues to be limited at this time. Minimal progress noted and discharge is recommended at end of Plan of Care. Consult Otolaryngology (ENT) and return to therapy if indicated.    Emerson is progressing well towards his goals. Current goals remain appropriate. Goals to be updated as necessary.     Patient prognosis is Good. Patient will continue to benefit from skilled outpatient speech and language therapy to address the deficits listed in the problem list on initial evaluation, provide patient/family education and to maximize patient's level of independence in the home and community environment.   Medical necessity is demonstrated by the following IMPAIRMENTS:  Aphasia: decreased content words and significant word finding difficulty in all situations severely limiting functional communication with both familiar and unfamiliar communication partners to relay medically and safety relevant information in a timely manner in a state of emergency.    Motor speech: Decreased speech intelligibility results in decreased efficiency communicating medical and social wants and needs in the case of emergency.    Barriers to Therapy: none identified   Patient's spiritual, cultural and educational needs considered and patient agreeable to plan of care and goals.  Plan:   Continue Plan of Care with focus on rehabilitation and compensation for cognitive communication, dysarthria, and expressive receptive language disorder.    Dayanna Rangel CCC-SLP   4/17/2024

## 2024-04-18 ENCOUNTER — CLINICAL SUPPORT (OUTPATIENT)
Dept: REHABILITATION | Facility: HOSPITAL | Age: 19
End: 2024-04-18
Payer: COMMERCIAL

## 2024-04-18 DIAGNOSIS — R27.8 IMPAIRED GROSS MOTOR COORDINATION: Primary | ICD-10-CM

## 2024-04-18 DIAGNOSIS — R27.8 DECREASED COORDINATION: ICD-10-CM

## 2024-04-18 DIAGNOSIS — R53.1 DECREASED STRENGTH: Primary | ICD-10-CM

## 2024-04-18 DIAGNOSIS — Z78.9 DEFICITS IN ACTIVITIES OF DAILY LIVING: ICD-10-CM

## 2024-04-18 PROCEDURE — 97110 THERAPEUTIC EXERCISES: CPT | Mod: PO

## 2024-04-18 PROCEDURE — 97530 THERAPEUTIC ACTIVITIES: CPT | Mod: PO

## 2024-04-18 NOTE — PROGRESS NOTES
"[facility]   Occupational Therapy Treatment Note     Name: Allyson Estrada  [facility] Number: [id number]    Therapy Diagnosis:   Encounter Diagnoses   Name Primary?    Decreased strength Yes    Deficits in activities of daily living     Decreased coordination      Physician: Jude Méndez MD    Physician Orders: Eval and Treat  Medical Diagnosis: G93.1 (ICD-10-CM) - Anoxic brain injury   Evaluation Date: 4/18/2024  Insurance Authorization Period Expiration:   Plan of Care Certification Period: 5/28/2024   Progress note due: 4/5/2024, 5/3/2024  Date of Return to MD: To be determined  Visit # / Visits authorized: 11/20 (+eval)  FOTO: 1/ 3, last assessed 4/16 /2024    Precautions:  Standard    Time In: 4:52 PM  Time Out: 5:25 PM  Total Billable Time: 38 minutes    Subjective     Pt reports: Pt reports he is doing okay.   he was compliant with home exercise program given last session (practice tying).   Response to previous treatment: no concerns  Functional change: no concerns    Pain: 0/10  Location: N/a    Objective     At 4:55  PM, HM=830/79, HR=77, SpO2= 98%     Treatment     Emerson received the treatments listed below:     Therapeutic exercises to develop strength and endurance for 10 minutes, including:    -UBE x5 min forward/ 5 min back w/ erika UE, level 3.8, standing to increase erika UE act aung and UB strength, as well as improving cardiovascular fitness. He was encouraged to keep rate of perceived exertion (RPE) at "easy to moderate" (3-4/10). West avg=21, peak west=36. Pt reported, "medium".     Therapeutic activities to improve functional performance for 28 minutes, including:    See vitals above.    Pt participated in meal preparation activity, Pt was tasked with making a microwave mug cake. He was able to read the directions with increased time. He was able to pick out a microwave safe bowl from the kit. Min a required to open packaging, but he was able to pour the contents of the " "package himself. He reported he had never really used measuring spoons, but he was able to pick out the TBSP once he was given cues of what to look for. Min verbal cues required to fill the TBSP completely before pouring into the mug, Min verbal cues to stir the contents completely. Pt did not know to press time cook on the microwave, but was able to push the time correctly (1 min 5 seconds). He used a small towel to keep his hands safe when taking the mug out of the microwave. He then enjoyed his treat while sitting at the table. Throughout task, pt looked to OT for assurance that he was completing the task correctly.     Neuromuscular re-education activities to improve Balance and Coordination for 0 minutes. The following activities were included:  N/a    Home Exercises and Education Provided     Education provided:   - Handwriting exercises at home   - Progress towards goals   - Cutting food and knife safety  -[place] making at home    Written Home Exercises Provided: Patient instructed to cont prior HEP.  Exercises were reviewed and Emerson was able to demonstrate them prior to the end of the session.  Emerson demonstrated good  understanding of the HEP provided.     See EMR under Patient Instructions for exercises provided 3/8/2024.     Assessment     Emerson participated well in his session today. Pt reported UBE "medium" today. Pt required overall min to mod assist with mug cake activity. Pt had the most difficulty  reading the directions, using measuring spoons and selecting time cook on microwave. Pt frequently looked to OT for assurance that he was completing the task correctly.     Emerson's rehab potential is Good.     Anticipated barriers to occupational therapy: Potentially transportation as patient does not drive.    Patient's spiritual, cultural and educational needs considered and patient is agreeable to the plan of care and goals as stated below:     Goals:  Short Term Goals:    Short Term Goals (6 weeks) " Status When Last Assessed  Progressing/ Met   1) Pt will complete HEP with minimal verbal cues from parent  progressing 4/18/2024    2) Handwriting to be assessed and goal added Completed 3/8/2024. See goal below.  Completed 3/8/2024    3) Pt will increase  strength as evidenced by 5# increase on dynamometer bilaterally R: 46.3#  L: 63.3# progressing 4/18/2024    4) Pt to be able to wash his back in the shower independently with adaptive equipment as needed  progressing 4/18/2024    5) Pts parent to report increased independence with oral hygiene including appropriate amount of toothpaste for task  progressing 4/18/2024    6) Pt will tie his shoes with minimal verbal cues   progressing 4/18/2024    7) Pt will prepare a cold meal from start to finish using appropriately portioned ingredients with minimal verbal cues   progressing 4/18/2024    8) New goal 3/8/2024: Patient will be able to copy 2 sentences from a book without skipping words with 80% legibility.   progressing 4/18/2024         LongTerm Goals (12 weeks) Status When Last Assessed  Progressing/ Met   1) Pt will increase his Box and Blocks score as evidenced by 10 block increase bilaterally to increase gross motor coordination R: 26  L: 21 progressing 4/18/2024    2) Pt will increase fine motor coordination as evidenced by completion of 9 Hole Peg test in 40 seconds or less bilaterally   R: 54s  L: 53s progressing 4/18/2024    3) Pt will increase  strength as evidenced by 10# increase on dynamometer bilaterally R: 46.3#  L: 63.3# progressing 4/18/2024    4) Pt will use butter knife appropriately with no safety concerns during meal preparation 5/5 attempts 4/4 attempts with no safety concerns on 3/28/2024 progressing 4/18/2024    5)  Pt will perform UBD / LBD independently, including buttons, fasteners, tying shoes, and with correct orientation of clothing items   progressing 4/18/2024    6) Pts parent will self report increased efficiency with  toilet hygiene   progressing 4/18/2024     Additional functional goals to be added as patient progresses and per his priorities.    Plan   Certification Period/Plan of care expiration: 4/18/2024 to 5/28/2024.    Outpatient Occupational Therapy 2 times weekly for 12 weeks to include the following interventions: Patient Education, Self Care, Therapeutic Activities, and Therapeutic Exercise.    Updates/Grading for next session: continue handwriting (copying), upgrade UBE, in-hand manipulation,  strength, shoe tying, toilet hygiene, shoulder AROM (for washing his back), visual perception assessment    Marcelle Sommer MS, OTR/L

## 2024-04-22 ENCOUNTER — CLINICAL SUPPORT (OUTPATIENT)
Dept: REHABILITATION | Facility: HOSPITAL | Age: 19
End: 2024-04-22
Payer: COMMERCIAL

## 2024-04-22 DIAGNOSIS — R53.1 DECREASED STRENGTH: Primary | ICD-10-CM

## 2024-04-22 DIAGNOSIS — R27.8 DECREASED COORDINATION: ICD-10-CM

## 2024-04-22 DIAGNOSIS — R27.8 IMPAIRED GROSS MOTOR COORDINATION: Primary | ICD-10-CM

## 2024-04-22 DIAGNOSIS — Z78.9 DEFICITS IN ACTIVITIES OF DAILY LIVING: ICD-10-CM

## 2024-04-22 PROCEDURE — 97110 THERAPEUTIC EXERCISES: CPT | Mod: PO

## 2024-04-22 PROCEDURE — 97530 THERAPEUTIC ACTIVITIES: CPT | Mod: PO

## 2024-04-22 NOTE — PROGRESS NOTES
"[facility]   Occupational Therapy Treatment Note     Name: Allyson Estrada  [facility] Number: [id number]    Therapy Diagnosis:   Encounter Diagnoses   Name Primary?    Decreased strength Yes    Deficits in activities of daily living     Decreased coordination      Physician: Jude Méndez MD    Physician Orders: Eval and Treat  Medical Diagnosis: G93.1 (ICD-10-CM) - Anoxic brain injury   Evaluation Date: 4/22/2024  Insurance Authorization Period Expiration:   Plan of Care Certification Period: 5/28/2024   Progress note due: 4/5/2024, 5/3/2024  Date of Return to MD: To be determined  Visit # / Visits authorized: 12/20 (+eval)  FOTO: 1/ 3, last assessed 4/16 /2024    Precautions:  Standard    Time In: 4:50 PM  Time Out: 5:30 PM  Total Billable Time: 40 minutes    Subjective     Pt reports: Pt reports he is doing okay. Mom reports increased abilities to tie shoe.   he was compliant with home exercise program given last session (practice tying).   Response to previous treatment: no concerns  Functional change: no concerns    Pain: 0/10  Location: N/a    Objective     At 4:52 PM, ZP=308/88, HR=52     Treatment     Emerson received the treatments listed below:     Therapeutic exercises to develop strength and endurance for 10 minutes, including:    -UBE x5 min forward/ 5 min back w/ erika UE, level 4.0, standing to increase erika UE act aung and UB strength, as well as improving cardiovascular fitness. He was encouraged to keep rate of perceived exertion (RPE) at "easy to moderate" (3-4/10). West avg=23, peak west=40. Pt reported, "medium".     Therapeutic activities to improve functional performance for 30 minutes, including:    See vitals above.    ADL coordination board    -medium buttons (x3) completed with increased time (I)     Practice donning / doffing short + long sleeve shirts with focus on orientation of the shirt. Completed x2 each    Practiced folding short + long sleeve shirts x2 each with " visual demonstration, significantly increased time, and min to mod a for technique     Neuromuscular re-education activities to improve Balance and Coordination for 0 minutes. The following activities were included:  N/a    Home Exercises and Education Provided     Education provided:   - Handwriting exercises at home   - Progress towards goals   - Cutting food and knife safety  -[place] making at home    Written Home Exercises Provided: Patient instructed to cont prior HEP.  Exercises were reviewed and Emerson was able to demonstrate them prior to the end of the session.  Emerson demonstrated good  understanding of the HEP provided.     See EMR under Patient Instructions for exercises provided 3/8/2024.     Assessment     Emerson participated well in his session today. No reported difficulty with UBE. Pt completed button board on his own with increased time. He was able to don short and long sleeve shirt with correct orientation each attempt. Pt with difficulty with technique of folding both short and long sleeve shirt.     Emerson's rehab potential is Good.     Anticipated barriers to occupational therapy: Potentially transportation as patient does not drive.    Patient's spiritual, cultural and educational needs considered and patient is agreeable to the plan of care and goals as stated below:     Goals:  Short Term Goals:    Short Term Goals (6 weeks) Status When Last Assessed  Progressing/ Met   1) Pt will complete HEP with minimal verbal cues from parent  progressing 4/22/2024    2) Handwriting to be assessed and goal added Completed 3/8/2024. See goal below.  Completed 3/8/2024    3) Pt will increase  strength as evidenced by 5# increase on dynamometer bilaterally R: 46.3#  L: 63.3# progressing 4/22/2024    4) Pt to be able to wash his back in the shower independently with adaptive equipment as needed  progressing 4/22/2024    5) Pts parent to report increased independence with oral hygiene including appropriate  amount of toothpaste for task  progressing 4/22/2024    6) Pt will tie his shoes with minimal verbal cues   progressing 4/22/2024    7) Pt will prepare a cold meal from start to finish using appropriately portioned ingredients with minimal verbal cues   progressing 4/22/2024    8) New goal 3/8/2024: Patient will be able to copy 2 sentences from a book without skipping words with 80% legibility.   progressing 4/22/2024         LongTerm Goals (12 weeks) Status When Last Assessed  Progressing/ Met   1) Pt will increase his Box and Blocks score as evidenced by 10 block increase bilaterally to increase gross motor coordination R: 26  L: 21 progressing 4/22/2024    2) Pt will increase fine motor coordination as evidenced by completion of 9 Hole Peg test in 40 seconds or less bilaterally   R: 54s  L: 53s progressing 4/22/2024    3) Pt will increase  strength as evidenced by 10# increase on dynamometer bilaterally R: 46.3#  L: 63.3# progressing 4/22/2024    4) Pt will use butter knife appropriately with no safety concerns during meal preparation 5/5 attempts 4/4 attempts with no safety concerns on 3/28/2024 progressing 4/22/2024    5)  Pt will perform UBD / LBD independently, including buttons, fasteners, tying shoes, and with correct orientation of clothing items   progressing 4/22/2024    6) Pts parent will self report increased efficiency with toilet hygiene   progressing 4/22/2024     Additional functional goals to be added as patient progresses and per his priorities.    Plan   Certification Period/Plan of care expiration: 3/5/2024 to 5/28/2024.    Outpatient Occupational Therapy 2 times weekly for 12 weeks to include the following interventions: Patient Education, Self Care, Therapeutic Activities, and Therapeutic Exercise.    Updates/Grading for next session: continue handwriting (copying), upgrade UBE, in-hand manipulation,  strength, shoe tying, toilet hygiene, shoulder AROM (for washing his back), visual  perception assessment    Marcelle Sommer MS, OTR/L

## 2024-04-22 NOTE — PLAN OF CARE
[facility]   Physical Therapy Treatment Note / Plan of Care Update      Name: Allyson Estrada  Clinic Number: [id number]      Therapy Diagnosis:   Encounter Diagnosis   Name Primary?    Impaired gross motor coordination Yes       Physician: Jude Méndez MD    Visit Date: 4/22/2024    Physician Orders: PT Eval and Treat Neuro Program   Medical Diagnosis from Referral: Anoxic brain injury [G93.1]   Evaluation Date: 2/26/2024  Authorization Period Expiration: 2/18/2025  Plan of Care Expiration: 5/20/24  Progress Note Due: 5/6/24  Date of Surgery: NA  Visit # / Visits authorized: 16/20 (17)   FOTO: 3/3     Time In: 1600   Time Out: 1645  Total Billable Time: 45 minutes       Precautions: Standard, Fall, and developmental delay     PTA visit 0/5    Subjective     Pt reports: without complaints  He reports was not compliant with home exercise program.   Response to previous treatment: no problems stated  Functional change: ongoing    Pain: 0/10   Location: Not Applicable       Objective      Objective Measures updated in Therapeutic Activities section below.      FOTO Balance Survey:         Therapist reviewed FOTO scores for Allyson Estrada on 4/22/24.   FOTO documents entered into [employer] - see Media section.     Functional Status Score: 54        Activities- specific Balance Confidence Scale (ABC) Score:      Intake: 50.6  3/26/24: 66.3  4/22/24: 58.1       Treatment     Emerson received the treatments listed below:       Emerson participated in therapeutic exercises to develop strength and endurance for 25 minutes including:     - Sci Fit Level 4 x 15 minutes with bilateral upper and lower extremities      - Shuttle Leg Press:                - 2x10 75 # bilateral lower extremity               - 2x10 50 # single lower extremity right and left      (Therapeutic rest breaks as needed)      therapeutic activities to improve functional performance for 20 minutes, including:     Reassessment of the  "following:      Single Leg Stance:               - Right 5 sec               - Left 3 sec      APTA Core Set Outcome Measures for Adults with Neurologic Conditions             Evaluation 2/26/24 Progress Note   3/26/24 Plan of Care Update 4/22/24 Reference values     Timed Up and Go 10.3 sec (average of 3 trials);    10.44 sec      Trial 1) 11.09  Trial 2) 10.10  Trial 3) 9.22  10.58 sec    Trial 1) 11.2  Trial 2) 10.45  Trial 3) 10.09  score >14 seconds indicates risk for falls in older stroke patients (Leif et al, 2006)    10 Meter Walk Test  1.2 m/sec (6m/5s)   "community ambulator" speed category 1.00 m/s (6m/ 5.99s) 1.19 (6m/ 5.06 sec)  0-0.4 m/s = household walker  0.4-0.8 m/s = limited community ambulator   0.8-1.4 m/s = community ambultor  >1.4 m/s = can cross street safely     Functional Gait Assessment  19/30 14/30 21/30  <22/30 = identifies fall risk in community dwelling older adults   (MCID = 4)    5 times sit-stand    16 seconds    13.25 seconds  14.73 seconds >12 sec= fall risk for general elderly  >10 sec= balance/vestibular dysfunction (<59 y/o)  >14.2 sec= balance/vestibular dysfunction (>59 y/o)  >12 sec= fall risk for stroke      Functional Gait Assessment:   1. Gait on level surface =  3   (3) Normal: less than 5.5 sec, no A.D., no imbalance, normal gait pattern, deviates< 6in   (2) Mild impairment: 7-5.6 sec, uses A.D., mild gait deviations, or deviates 6-10 in   (1) Moderate impairment: > 7 sec, slow speed, imbalance, deviates 10-15 in.   (0) Severe impairment: needs assist, deviates >15 in, reach/touch wall  2. Change in Gait Speed = 3   (3) Normal: smooth change w/o loss of balance or gait deviation, deviates < 6 in, significant difference between speeds   (2) Mild impairment: changes speed, but demonstrates mild gait deviations, deviates 6-10 in, OR no deviations but unable to significantly speed, OR uses A.D.   (1) Moderate impairment: minor changes to speed, OR changes speed w/ " significant deviations, deviates 10-15 in, OR  Changes speed , but loses balance & recovers   (0) Severe impairment: cannot change speed, deviates >15 in, or loses balance & needs assist  3. Gait with horizontal head turns  = 2   (3) Normal: no change in gait, deviates <6 in   (2) Mild impairment: slight change in speed, deviates 6-10 in, OR uses A.D.   (1) Moderate impairment: moderate change in speed, deviates 10-15 in   (0) Severe impairment: severe disruption of gait, deviates >15in  4. Gait with vertical head turns = 2   (3) Normal: no change in gait, deviates <6 in   (2) Mild impairment: slight change in speed, deviates 6-10 in OR uses A.D.   (1) Moderate impairment: moderate change in speed, deviates 10-15 in   (0) Severe impairment: severe disruption of gait, deviates >15 in  5. Gait with pivot turns = 2   (3) Normal: performs safely in 3 sec, no LOB   (2) Mild impairment: performs in >3 sec & no LOB, OR turns safely & requires several steps to regain LOB   (1) Moderate impairment: turns slow, OR requires several small steps for balance following turn & stop   (0) Severe impairment: cannot turn safely, needs assist  6. Step over obstacle = 2   (3) Normal: steps over 2 stacked boxes w/o change in speed or LOB   (2) Mild impairment: able to step over 1 box w/o change in speed or LOB   (1) Moderate impairment: steps over 1 box but must slow down, may require VC   (0) Severe impairment: cannot perform w/o assist  7. Gait with Narrow INDIRA = 1   (3) Normal: 10 steps no staggering   (2) Mild impairment: 7-9 steps   (1) Moderate impairment: 4-7 steps   (0) Severe impairment: < 4 steps or cannot perform w/o assist  8. Gait with eyes closed = 2   (3) Normal: < 7 sec, no A.D., no LOB, normal gait pattern, deviates <6 in   (2) Mild impairment: 7.1-9 sec, mild gait deviations, deviates 6-10 in   (1) Moderate impairment: > 9 sec, abnormal pattern, LOB, deviates 10-15 in   (0) Severe impairment: cannot perform w/o assist,  LOB, deviates >15in  9. Ambulating Backwards = 2   (3) Normal: no A.D., no LOB, normal gait pattern, deviates <6in   (2) Mild impairment: uses A.D., slower speed, mild gait deviations, deviates 6-10 in   (1) Moderate impairment: slow speed, abnormal gait pattern, LOB, deviates 10-15 in   (0) Severe impairment: severe gait deviations or LOB, deviates >15in  10. Steps = 2   (3) Normal: alternating feet, no rail   (2) Mild Impairment: alternating feet, uses rail   (1) Moderate impairment: step-to, uses rail   (0) Severe impairment: cannot perform safely    Score 21/30     Score:   <22/30 fall risk   <20/30 fall risk in older adults   <18/30 fall risk in Parkinsons           Home Exercises Provided and Patient Education Provided     Education provided:     - Patient provided with verbal and demonstrative instruction for all activities performed in today's session.     Written Home Exercises Provided: Instructed to continue home exercise program.        See EMR under Patient Instructions for exercises provided 3/1/2024.    Assessment     Pt demonstrated slight improvements in Functional Gait Assessment score, five time sit to stand time, and gait speed compared to previous sessions. He remains limited in single limb balance. He is making progress toward short- term and long- term goals and will benefit from four additional weeks of therapy to improve single limb balance, stair negotiation and dynamic standing balance.     Emerson Is progressing towards his goals.   Pt prognosis is Good.      Anticipated barriers to physical therapy: none at this time.     Goals:  Short Term Goals: (2 weeks) Date Last Assessed Status:    1. Patient will be provided with an individualized home exercise program.  4/22/2024   MET   2.  Patient will demonstrate improve endurance and activity tolerance as evidenced by ability to perform Nu-step x 15 minutes without rests breaks with heart rate post-activity equivalent to 50-80% of maximum heart  rate.  4/22/2024   progressing         Long Term Goals: (8 weeks) Date established: Status:    1. Patient will be independent and compliant with updated home exercise program. 4/22/2024      ongoing   2.  Patient will increase his gait speed as assessed by the Timed 10 Meter Walk Test from 1.2 m/s to >1.4 m/s to increase his safety and independence with gait at a community level. (Minimal Clinically Important Difference for older adults = 0.13 m/s)    4/22/2024      ongoing      3. Patient will improve his score on the Functional Gait Assessment (FGA) from 19/30 to at least 23/30, indicating improved safety and (I) with dynamic, community level gait. (Minimal Detectable Change for older adults= 4 points)  4/22/2024   progressing   4. Patient will maintain bilateral single leg stance for at least 15 seconds to improve standing balance and overall safety with functional tasks.  4/22/2024   ongoing   5. Patient will perform 5 sit to stands in < 10 seconds to demonstrate a reduction in fall risk.  4/22/2024   ongoing   6. Patient will begin some form of community fitness to begin regular and consistent performance of exercise to continue maintenance of gains made in physical therapy.  4/22/2024   ongoing        Plan   Updated Certification Period: 4/22/24 to 5/20/24      Outpatient Physical Therapy 2 times weekly for 4 weeks to include the following interventions: Gait Training, Neuromuscular Re-ed, Patient Education, Therapeutic Activities, and Therapeutic Exercise.     Recommendations for next session: Stairs and single leg balance exercises     Brianna Lindsey, PT

## 2024-04-22 NOTE — PROGRESS NOTES
Thank you for your referral. Please see Treatment Tab for updated Physical Therapy Plan of Care. Thanks!

## 2024-04-23 ENCOUNTER — CLINICAL SUPPORT (OUTPATIENT)
Dept: REHABILITATION | Facility: HOSPITAL | Age: 19
End: 2024-04-23
Payer: COMMERCIAL

## 2024-04-23 DIAGNOSIS — R47.1 DYSARTHRIA: Primary | ICD-10-CM

## 2024-04-23 PROCEDURE — 92507 TX SP LANG VOICE COMM INDIV: CPT | Mod: PO

## 2024-04-23 NOTE — PLAN OF CARE
[facility]  Speech Therapy Updated Plan of Care-Neurological Rehabilitation         Date: 4/23/2024   Name: Allyson Estrada  Clinic Number: [id number]    Therapy Diagnosis:   Encounter Diagnosis   Name Primary?    Dysarthria Yes     Physician: Jude Méndez MD    Physician Orders: Ambulatory Referral to Speech Therapy   Medical Diagnosis: Anoxic brain injury [G93.1]     Visit #/ Visits Authorized:  7 /20   Evaluation Date: 2/27/2024  Insurance Authorization Period: 2/28/2024 - 12/31/2024  Plan of Care Expiration:    4/23/2024  New POC Certification Period:  4/23/2024 - 5/14/2024    Total Visits Received: 3    Precautions:Standard  Subjective     Update: Patient with needing significant cues throughout the session for use of loud voice. Query if an EMST would be beneficial for this patient; however, needs full Otolaryngology (ENT) assessment.Otolaryngology (ENT) appointment has yet to be scheduled.  Addressed final consonant deletion throughout as appropriate for intelligibility; though partially may be dialectical and partially breath support related. Breath support addressed but continues to be limited at this time. Minimal progress noted and discharge is recommended at end of Plan of Care. Consult Otolaryngology (ENT) and return to therapy if indicated. Patient to complete 3 more speech therapy visits to review Plan of Care and continue education with family to improve carryover at home and across environments.     Objective     Update: see follow up note dated 4/23/2024    Assessment     Update: Allyson Estrada presents to [facility] status post medical diagnosis of Anoxic brain injury [G93.1 . Demonstrates impairments including limitations as described in the problem list. Positive prognostic factors include good motivation and family support. Negative prognostic factors include time elapsed since CVA. He presents with Severe Ataxic dysarthria characterized by slow articulatory  breakdowns, slow rate, low vocal loudness, excess stress. No barriers to therapy identified.. Patient will benefit from skilled, outpatient rehabilitation speech therapy.    Rehab Potential: fair   Pt's spiritual, cultural, and educational needs considered and patient agreeable to plan of care and goals.    Education: Plan of Care, role of SLP in care, course of medical disease affect on therapy diagnosis , scheduling/ cancellation policy, and insurance limitations / visit limit  - patient and mother report understanding of all education provided across visits     Short Term Goals: (6 weeks) Current Progress:   Patient will complete RBANS assessment to determine further speech therapy Plan of Care Goal Met 3/4/2024     2. Patient will recall 4/4 clear speech strategies with minimal assist      Progressing/ Not Met 4/23/2024    Progressing/Not Met 4/23/2024       3. Patient will read aloud low level passage with independently use of clear speech strategies with 80% accuracy independently.       Progressing/ Not Met 4/23/2024    Progressing/Not Met 4/23/2024    4. Patient will complete diaphragmatic breathing exercises by producing maximal exhalation via diaphragmic breathing with a duration of 5 seconds in 10 trials, consistently with minimal tactile and verbal clinician cues.       Progressing/ Not Met 4/23/2024    Progressing/Not Met 4/23/2024    5. Patient will complete ENT assessment for clearance of PhoRTE exercises in speech therapy.       Progressing/ Not Met 4/23/2024    Patient has yet to be scheduled with Otolaryngology (ENT)    6. Patient will name objects with 90% accuracy independently.    Goal Met 4/17/2024     7. Patient will state the function of objects using 3+ word sentences with minimal cues.     Progressing/Not Met 4/23/2024       Long Term Goals: (8 weeks) Current Progress:   He  will develop functional and intelligible speech and utilize compensatory strategies through the use of adequate  labial and lingual function, increased articulatory precision and speech prosody.    Progressing/Not Met    2. He will use appropriate memory strategies to schedule and recall weekly activities, express needs and recall names to maintain safety and participate socially in functional living environment.       Progressing/Not Met    3. He  will develop functional cognitive-linguistic based skills and utilize compensatory strategies to communicate wants and needs effectively to different conversational partners, maintain safety, and participate socially in functional living environment.        Progressing/Not Met    4. He  will demonstrate improved vocal quality and intonation at the conversation level to communicate basic medical and social needs in the functional living environment.       Progressing/Not Met      Goals Previously Met:  N/a     Reasons for Recertification of Therapy: Patient to complete 3 more speech therapy visits to review Plan of Care and continue education with family to improve carryover at home and across environments.      Plan     Updated Certification Period: 4/23/2024 to 5/14/2024    Recommended Treatment Plan: Patient will participate in the [facility] rehabilitation program for speech therapy 1 times per week to address his Communication deficits, to educate patient and their family, and to participate in a home exercise program.     Other recommendations: n/a     Therapist's Name:  Hilda Diop CCC-SLP   4/23/2024      I CERTIFY THE NEED FOR THESE SERVICES FURNISHED UNDER THIS PLAN OF TREATMENT AND WHILE UNDER MY CARE      Physician Name: _______________________________    Physician Signature: ____________________________

## 2024-04-23 NOTE — PROGRESS NOTES
[facility]  Speech Therapy Treatment Note- Neurological Rehabilitation  Plan of Care UPDATE    Date: 4/23/2024     Name: Allyson Estrada   MRN: [id number]                                                                            Therapy Diagnosis:   Encounter Diagnosis   Name Primary?    Dysarthria Yes     Physician: Jude Méndez MD  Physician Orders: Ambulatory Referral to Speech Therapy   Medical Diagnosis: Anoxic brain injury [G93.1]     Visit #/ Visits Authorized: 7/ 20  Date of Evaluation:  2/27/2024  Insurance Authorization Period: 2/28/2024 - 12/31/2024   Plan of Care Expiration Date:    4/23/2024 - this note  Extended Plan of Care:  not applicable    Progress Note: due 4/23/2024 - this note    Time In:  1620  Time Out:  1700  Total Billable Time: 40     Precautions: Standard and Communication  Subjective:   Patient reports: No major changes from previous session  He was compliant to home exercise program.   Response to previous treatment: good  Pain Scale: no pain indicated throughout session  Objective:         UNTIMED  Speech- Language- Voice Therapy     Short Term Goals: (6 weeks) Current Progress:   Patient will complete RBANS assessment to determine further speech therapy Plan of Care Goal Met 3/4/2024     2. Patient will recall 4/4 clear speech strategies with minimal assist      Progressing/ Not Met 4/23/2024    Worked on loud speech today as the main strategy address (continued)    Needed cues for  increasing loudness throughout the session      3. Patient will read aloud low level passage with independently use of clear speech strategies with 80% accuracy independently.       Progressing/ Not Met 2024    Patient completed with reading aloud sentences with 10+ words. Patient needed max cues to improve loudness and breath support strategies    To improve breath support/coordination, patient completed reading out loud multi-syllabic words. Patient needed moderate cues in task to improve breath support and loudness.      4. Patient will complete diaphragmatic breathing exercises by producing maximal exhalation via diaphragmic breathing with a duration of 5 seconds in 10 trials, consistently with minimal tactile and verbal clinician cues.       Progressing/ Not Met 2024    Unable to produce a breath longer than 4 seconds with verbal cues and encouragement. Used a straw with a tissue for visual biofeedback- continued this date    Abnormal francine continues, but improves with loud voice   5. Patient will complete ENT assessment for clearance of PhoRTE exercises in speech therapy.       Progressing/ Not Met 2024    Patient has yet to be scheduled with Otolaryngology (ENT)    6. Patient will name objects with 90% accuracy independently.  Goal Met 2024     7. Patient will state the function of objects using 3+ word sentences with minimal cues.     Progressing/Not Met 2024  -Not addressed this session.      Patient Education/Response:   Patient educated regarding the followin. Using his loud voice when communicating   2. Scheduling for Otolaryngology (ENT)   3. Discussed with patient's family patient's participation in speech therapy today and speech therapy Plan of Care for next few visits - voiced understanding.     Home program established: yes-use of loud voice  Patient verbalized understanding to all above education provided.     See Electronic Medical Record under Patient Instructions  for exercises provided throughout therapy.  Assessment:   Patient with needing significant cues throughout the session for use of loud voice. Query if an EMST would be beneficial for this patient; however, needs full Otolaryngology (ENT) assessment.Otolaryngology (ENT) appointment has yet to be scheduled.  Addressed final consonant deletion throughout as appropriate for intelligibility; though partially may be dialectical and partially breath support related. Breath support addressed but continues to be limited at this time. Minimal progress noted and discharge is recommended at end of Plan of Care. Consult Otolaryngology (ENT) and return to therapy if indicated. Patient to complete 3 more speech therapy visits to review Plan of Care and continue education with family to improve carryover at home and across environments.     Emerson is progressing well towards his goals. Current goals remain appropriate. Goals to be updated as necessary.     Patient prognosis is Good. Patient will continue to benefit from skilled outpatient speech and language therapy to address the deficits listed in the problem list on initial evaluation, provide patient/family education and to maximize patient's level of independence in the home and community environment.   Medical necessity is demonstrated by the following IMPAIRMENTS:  Aphasia: decreased content words and significant word finding difficulty in all situations severely limiting functional communication with both familiar and unfamiliar communication partners to relay medically and safety relevant information in a timely manner in a state of emergency.    Motor speech: Decreased speech intelligibility results in decreased efficiency communicating medical and social wants and needs in the case of emergency.    Barriers to Therapy: none identified   Patient's spiritual, cultural and educational needs considered and patient agreeable to plan of care and goals.  Plan:   Continue Plan of  Care with focus on rehabilitation and compensation for cognitive communication, dysarthria, and expressive receptive language disorder. Patient to complete 3 more speech therapy visits to review Plan of Care and continue education with family to improve carryover at home and across environments.     Hilda Diop CCC-SLP   4/23/2024

## 2024-04-23 NOTE — PLAN OF CARE
Outpatient Therapy Discharge Summary   Discharge Date: 4/23/2024   Name: Allyson Estrada  [facility] Number: [id number]  Therapy Diagnosis:   Encounter Diagnosis   Name Primary?    Dysarthria Yes     Physician: Jude Méndez MD  Physician Orders: Ambulatory Referral to Speech Therapy   Medical Diagnosis: Anoxic brain injury [G93.1]   Evaluation Date: 2/27/2024    Date of Last visit: 4/23/2024  Total Visits Received: ***  Cancelled Visits: ***  No Show Visits: ***    Assessment    Assessment of Current Status: ***     Goals: ***    Long Term Goals:  ***     Discharge reason: Patient has reached the maximum rehab potential for the present time    Plan   This patient is discharged from Speech Therapy    Hilda Diop CCC-SLP   4/23/2024

## 2024-05-01 ENCOUNTER — CLINICAL SUPPORT (OUTPATIENT)
Dept: REHABILITATION | Facility: HOSPITAL | Age: 19
End: 2024-05-01
Payer: COMMERCIAL

## 2024-05-01 DIAGNOSIS — R47.1 DYSARTHRIA: Primary | ICD-10-CM

## 2024-05-01 PROCEDURE — 92507 TX SP LANG VOICE COMM INDIV: CPT | Mod: PO

## 2024-05-01 NOTE — PROGRESS NOTES
[facility]  Speech Therapy Treatment Note- Neurological Rehabilitation    Date: 5/1/2024     Name: Allyson Estrada   MRN: [id number]                                                                            Therapy Diagnosis:   Encounter Diagnosis   Name Primary?    Dysarthria Yes     Physician: Jude Méndez MD  Physician Orders: Ambulatory Referral to Speech Therapy   Medical Diagnosis: Anoxic brain injury [G93.1]     Visit #/ Visits Authorized: 8/ 20  Date of Evaluation:  2/27/2024  Insurance Authorization Period: 2/28/2024 - 12/31/2024   Plan of Care Expiration Date:    4/23/2024, 5/14/2024  Extended Plan of Care:  not applicable    Progress Note: due 5/14/2024    Time In:  1620  Time Out:  1700  Total Billable Time: 40     Precautions: Standard and Communication  Subjective:   Patient reports: No major changes from previous session, Mom present this session who notes progress at home. States no Otolaryngology (ENT) visit has been scheduled  He was compliant to home exercise program.   Response to previous treatment: good  Pain Scale: no pain indicated throughout session  Objective:         UNTIMED  Speech- Language- Voice Therapy     Short Term Goals: (6 weeks) Current Progress:   Patient will complete RBANS assessment to determine further speech therapy Plan of Care Goal Met 3/4/2024     2. Patient will recall 4/4 clear speech strategies with minimal assist      Progressing/ Not Met 5/1/2024    Worked on loud  speech today as the main strategy address (continued)    Needed cues for increasing loudness throughout the session      3. Patient will read aloud low level passage with independently use of clear speech strategies with 80% accuracy independently.       Progressing/ Not Met 2024    Not addressed in today's session.      4. Patient will complete diaphragmatic breathing exercises by producing maximal exhalation via diaphragmic breathing with a duration of 5 seconds in 10 trials, consistently with minimal tactile and verbal clinician cues.       Progressing/ Not Met 2024    Unable to produce a breath longer than 4 seconds with verbal cues and encouragement. Used a straw with a tissue for visual biofeedback- continued this date    Abnormal francine continues, but improves with loud voice    Demonstrated to Mom for aided carryover at home    5. Patient will complete ENT assessment for clearance of PhoRTE exercises in speech therapy.       Progressing/ Not Met 2024    Patient has yet to be scheduled with Otolaryngology (ENT)  Mom in agreement to call    6. Patient will name objects with 90% accuracy independently.  Goal Met 2024     7. Patient will state the function of objects using 3+ word sentences with minimal cues.     Progressing/Not Met 2024  -Not addressed this session.      Patient Education/Response:   Patient  and mom educated regarding the followin. Using his loud voice when communicating   2. Scheduling for Otolaryngology (ENT), reviewed next steps in therapy given Otolaryngology (ENT)- Phorte and EMST specifically the breather   3. Progress seen and limitations of continuation of therapy at this time  4. Hold from Speech Therapy until seen by Otolaryngology (ENT)- Mom in agreement   5. Anatomy and physiology of speech noted    Home program established: yes-use of loud voice  Patient verbalized understanding to all above education provided.     See Electronic Medical Record under  Patient Instructions for exercises provided throughout therapy.  Assessment:   Patient with needing significant cues throughout the session for use of loud voice. Query if an EMST would be beneficial for this patient; however, needs full Otolaryngology (ENT) assessment.Otolaryngology (ENT) appointment has yet to be scheduled.  Entire session was spent in education of patient and mom- see above.    Emerson is progressing well towards his goals. Current goals remain appropriate. Goals to be updated as necessary.     Patient prognosis is Good. Patient will continue to benefit from skilled outpatient speech and language therapy to address the deficits listed in the problem list on initial evaluation, provide patient/family education and to maximize patient's level of independence in the home and community environment.   Medical necessity is demonstrated by the following IMPAIRMENTS:  Aphasia: decreased content words and significant word finding difficulty in all situations severely limiting functional communication with both familiar and unfamiliar communication partners to relay medically and safety relevant information in a timely manner in a state of emergency.    Motor speech: Decreased speech intelligibility results in decreased efficiency communicating medical and social wants and needs in the case of emergency.    Barriers to Therapy: none identified   Patient's spiritual, cultural and educational needs considered and patient agreeable to plan of care and goals.  Plan:   Hold for 2 weeks for Otolaryngology (ENT) to assess patient and return to therapy on  5/16/2024    Dayanna Rangel CCC-SLP   5/1/2024

## 2024-05-14 ENCOUNTER — DOCUMENTATION ONLY (OUTPATIENT)
Dept: REHABILITATION | Facility: HOSPITAL | Age: 19
End: 2024-05-14

## 2024-05-14 NOTE — PROGRESS NOTES
PT/PTA met face to face to discuss pt's treatment plan and progress towards established goals. Pt will be seen by a physical therapist minimally every 6th visit or every 30 days.    Felicitas Tucker PTA

## 2024-05-21 ENCOUNTER — CLINICAL SUPPORT (OUTPATIENT)
Dept: REHABILITATION | Facility: HOSPITAL | Age: 19
End: 2024-05-21
Payer: COMMERCIAL

## 2024-05-21 ENCOUNTER — TELEPHONE (OUTPATIENT)
Dept: REHABILITATION | Facility: HOSPITAL | Age: 19
End: 2024-05-21
Payer: COMMERCIAL

## 2024-05-21 DIAGNOSIS — R27.8 IMPAIRED GROSS MOTOR COORDINATION: Primary | ICD-10-CM

## 2024-05-21 PROCEDURE — 97530 THERAPEUTIC ACTIVITIES: CPT | Mod: PO

## 2024-05-21 PROCEDURE — 97110 THERAPEUTIC EXERCISES: CPT | Mod: PO

## 2024-05-21 NOTE — TELEPHONE ENCOUNTER
Speech Language Pathologist attempted to return phone call from patient's parents this morning. No answer with number in patient chart - Speech Language Pathologist left a voicemail with return contact information in effort to discuss patient's Plan of Care, plan, and attempt to schedule visits as appropriate.     KALLI Boyle, CCC-SLP, CBIS  Speech-Language Pathology  5/21/2024

## 2024-05-22 ENCOUNTER — CLINICAL SUPPORT (OUTPATIENT)
Dept: REHABILITATION | Facility: HOSPITAL | Age: 19
End: 2024-05-22
Payer: COMMERCIAL

## 2024-05-22 DIAGNOSIS — R53.1 DECREASED STRENGTH: Primary | ICD-10-CM

## 2024-05-22 DIAGNOSIS — Z78.9 DEFICITS IN ACTIVITIES OF DAILY LIVING: ICD-10-CM

## 2024-05-22 DIAGNOSIS — R27.8 DECREASED COORDINATION: ICD-10-CM

## 2024-05-22 PROCEDURE — 97530 THERAPEUTIC ACTIVITIES: CPT | Mod: PO

## 2024-05-22 PROCEDURE — 97110 THERAPEUTIC EXERCISES: CPT | Mod: PO

## 2024-05-22 NOTE — PROGRESS NOTES
"[facility]   Occupational Therapy Treatment Note     Name: Allyson Estrada  [facility] Number: [id number]    Therapy Diagnosis:   Encounter Diagnoses   Name Primary?    Decreased strength Yes    Deficits in activities of daily living     Decreased coordination      Physician: Jude Méndez MD    Physician Orders: Eval and Treat  Medical Diagnosis: G93.1 (ICD-10-CM) - Anoxic brain injury   Evaluation Date: 5/22/2024  Insurance Authorization Period Expiration:   Plan of Care Certification Period: 5/28/2024   Progress note due: 4/5/2024, 5/3/2024  Date of Return to MD: To be determined  Visit # / Visits authorized: 13/20 (+eval)  FOTO: 1/ 3, last assessed 4/16 /2024    Precautions:  Standard    Time In: 4:06 PM  Time Out: 4:45 PM  Total Billable Time: 39 minutes    Subjective     Pt reports: Pt reports he is doing good. Mom reports that he has been practicing cutting food and tying his shoes. He continues to need practice and assistance.     he was compliant with home exercise program given last session (practice tying).   Response to previous treatment: no concerns  Functional change: no concerns    Pain: 0/10  Location: N/a    Objective     Updated as necessary     Treatment     Emerson received the treatments listed below:     Therapeutic exercises to develop strength and endurance for 10 minutes, including:    -UBE x5 min forward/ 5 min back w/ erika UE, level 4.0, standing to increase erika UE act aung and UB strength, as well as improving cardiovascular fitness. He was encouraged to keep rate of perceived exertion (RPE) at "easy to moderate" (3-4/10). West avg=22, peak west=36. Pt reported, "medium".     Therapeutic activities to improve functional performance for 29 minutes, including:    Practiced folding laundry (washcloths, pillowcases, and towels). He started with the smallest and worked his way to the largest piece of laundry. He required  several visual demonstrations for each. He did the " best with the washcloth and was able to fold it (I). He had more diffiulty with the larger items, pillow case and towel, and required mod verbal cues and min a intermittently within different steps of the task. Pt encouraged to practice at home.     Pt practiced initial tie for shoe tying. He was unable to produce the initial tie on his own and required several visual demonstrations and mod A and verbal cues x6.     Neuromuscular re-education activities to improve Balance and Coordination for 0 minutes. The following activities were included:  N/a    Home Exercises and Education Provided     Education provided:   - Handwriting exercises at home   - Progress towards goals   - Cutting food and knife safety  -[place] making at home    Written Home Exercises Provided: Patient instructed to cont prior HEP.  Exercises were reviewed and Emerson was able to demonstrate them prior to the end of the session.  Emerson demonstrated good  understanding of the HEP provided.     See EMR under Patient Instructions for exercises provided 3/8/2024.     Assessment     Emerson participated well in his session today. No reported difficulty with UBE. Pt completed folding activity with washcloths, pillowcases, and towels. He was able to fold the washcloths (I) after demonstrations, trial, and cues. Pillow cases and towels were much more difficult for patient as they were bigger and rectangular shaped. HE was encouraged to practice at home. More difficulty with initial tie of shoe than when last assessed. He was unable to produce the tie without demonstration, physical assistance and verbal cues.     Emerson's rehab potential is Good.     Anticipated barriers to occupational therapy: Potentially transportation as patient does not drive.    Patient's spiritual, cultural and educational needs considered and patient is agreeable to the plan of care and goals as stated below:     Goals:  Short Term Goals:    Short Term Goals (6 weeks) Status When Last  Assessed  Progressing/ Met   1) Pt will complete HEP with minimal verbal cues from parent  progressing 5/22/2024    2) Handwriting to be assessed and goal added Completed 3/8/2024. See goal below.  Completed 3/8/2024    3) Pt will increase  strength as evidenced by 5# increase on dynamometer bilaterally R: 46.3#  L: 63.3# progressing 5/22/2024    4) Pt to be able to wash his back in the shower independently with adaptive equipment as needed  progressing 5/22/2024    5) Pts parent to report increased independence with oral hygiene including appropriate amount of toothpaste for task  progressing 5/22/2024    6) Pt will tie his shoes with minimal verbal cues   progressing 5/22/2024    7) Pt will prepare a cold meal from start to finish using appropriately portioned ingredients with minimal verbal cues   progressing 5/22/2024    8) New goal 3/8/2024: Patient will be able to copy 2 sentences from a book without skipping words with 80% legibility.   progressing 5/22/2024         LongTerm Goals (12 weeks) Status When Last Assessed  Progressing/ Met   1) Pt will increase his Box and Blocks score as evidenced by 10 block increase bilaterally to increase gross motor coordination R: 26  L: 21 progressing 5/22/2024    2) Pt will increase fine motor coordination as evidenced by completion of 9 Hole Peg test in 40 seconds or less bilaterally   R: 54s  L: 53s progressing 5/22/2024    3) Pt will increase  strength as evidenced by 10# increase on dynamometer bilaterally R: 46.3#  L: 63.3# progressing 5/22/2024    4) Pt will use butter knife appropriately with no safety concerns during meal preparation 5/5 attempts 4/4 attempts with no safety concerns on 3/28/2024 progressing 5/22/2024    5)  Pt will perform UBD / LBD independently, including buttons, fasteners, tying shoes, and with correct orientation of clothing items   progressing 5/22/2024    6) Pts parent will self report increased efficiency with toilet hygiene    progressing 5/22/2024     Additional functional goals to be added as patient progresses and per his priorities.    Plan   Certification Period/Plan of care expiration: 3/5/2024 to 5/28/2024.    Outpatient Occupational Therapy 2 times weekly for 12 weeks to include the following interventions: Patient Education, Self Care, Therapeutic Activities, and Therapeutic Exercise.    Updates/Grading for next session: continue handwriting (copying), upgrade UBE, in-hand manipulation,  strength, shoe tying, toilet hygiene, shoulder AROM (for washing his back), visual perception assessment    Marcelle Sommer MS, OTR/L

## 2024-05-22 NOTE — PLAN OF CARE
"  [facility]   Physical Therapy Plan of Care Update     Name: Allyson Estrada  Clinic Number: [id number]      Therapy Diagnosis:   Encounter Diagnosis   Name Primary?    Impaired gross motor coordination Yes       Physician: Jude Méndez MD    Visit Date: 5/21/2024    Physician Orders: PT Eval and Treat Neuro Program   Medical Diagnosis from Referral: Anoxic brain injury [G93.1]   Evaluation Date: 2/26/2024  Authorization Period Expiration: 2/18/2025  Plan of Care Expiration: 7/3/24  Progress Note Due: 6/21/24  Date of Surgery: NA  Visit # / Visits authorized: 15/20 (16)  FOTO: 1/3     Time In: 4:03 pm   Time Out: 4:45 pm   Total Billable Time: 42 minutes       Precautions: Standard, Fall, and developmental delay     PTA visit 0/5    Subjective     Pt reports:" doing good."    He reports was not compliant with home exercise program.   Response to previous treatment: no problems stated  Functional change: ongoing    Pain: 0/10   Location: Not Applicable       Objective      Objective Measures updated at progress report unless specified.     Treatment     Emerson received the treatments listed below:      therapeutic exercises to develop strength, endurance and flexibility flexibility for 15 minutes including:    Sci fit level 6 bilateral upper extremity/ lower extremity 15 minutes     Therapeutic activities to improve functional performance for 27 minutes, including:       APTA Core Set Outcome Measures for Adults with Neurologic Conditions                  Evaluation 2/26/24 Progress Note   3/26/24 Plan of Care Update 4/22/24 5/21/24 Reference values    Timed Up and Go 10.3 sec (average of 3 trials);    10.44 sec      Trial 1) 11.09  Trial 2) 10.10  Trial 3) 9.22  10.58 sec     Trial 1) 11.2  Trial 2) 10.45  Trial 3) 10.09  9.8 sec no assistive device  score >14 seconds indicates risk for falls in older stroke patients (Leif et al, 2006)   10 Meter Walk Test  1.2 m/sec (6m/5s)   " ""community ambulator" speed category 1.00 m/s (6m/ 5.99s) 1.19 (6m/ 5.06 sec)  1.06 m/s (6m/5.64 sec)  0-0.4 m/s = household walker  0.4-0.8 m/s = limited community ambulator   0.8-1.4 m/s = community ambultor  >1.4 m/s = can cross street safely    Functional Gait Assessment  19/30 14/30 21/30  Discontinued (unable to follow commands) <22/30 = identifies fall risk in community dwelling older adults   (MCID = 4)   5 times sit-stand    16 seconds    13.25 seconds  14.73 seconds 16.7 sec >12 sec= fall risk for general elderly  >10 sec= balance/vestibular dysfunction (<59 y/o)  >14.2 sec= balance/vestibular dysfunction (>59 y/o)  >12 sec= fall risk for stroke        Home Exercises Provided and Patient Education Provided     Education provided:     - educated about lack of progress with therapy. Pt reports understanding    Written Home Exercises Provided: Instructed to continue home exercise program.        See EMR under Patient Instructions for exercises provided 3/1/2024.    Assessment       Pt tolerated session well. Plan of Care Update today to reassess progress towards goals. Pt has met 2/2 short term goals and 0/6 long term goals.  mom says pt needs more help with performing multiple stairs. Reports he does ok with a few stairs but loses balance more with many. Pt has not made any progress since last POC, however pt has not attended therapy since last POC with scheduling conflicts. Will attempt one additional POC to try and improve stair navigation and balance.      Emerson Is progressing towards his goals.   Pt prognosis is Good.     Pt will continue to benefit from skilled outpatient physical therapy to address the deficits listed in the problem list box on initial evaluation, provide pt/family education and to maximize pt's level of independence in the home and community environment.     Pt's spiritual, cultural and educational needs considered and pt agreeable to plan of care and goals.     Anticipated barriers " to physical therapy: none at this time.     Goals:      Short Term Goals: (2 weeks) Date Last Assessed Status:    1. Patient will be provided with an individualized home exercise program.  4/22/2024    MET   2.  Patient will demonstrate improve endurance and activity tolerance as evidenced by ability to perform Nu-step x 15 minutes without rests breaks with heart rate post-activity equivalent to 50-80% of maximum heart rate.  5/21/24    MET         Long Term Goals: (8 weeks) Date last assessed: Status:    1. Patient will be independent and compliant with updated home exercise program. 5/21/24       ongoing   2.  Patient will increase his gait speed as assessed by the Timed 10 Meter Walk Test from 1.2 m/s to >1.4 m/s to increase his safety and independence with gait at a community level. (Minimal Clinically Important Difference for older adults = 0.13 m/s)    5/21/24       ongoing      DISCONTINUED   4. Patient will maintain bilateral single leg stance for at least 10 seconds to improve standing balance and overall safety with functional tasks.  5/21/24    ongoing   5. Patient will perform 5 sit to stands in < 12 seconds to demonstrate a reduction in fall risk.  5/21/24    ongoing   6. Patient will begin some form of community fitness to begin regular and consistent performance of exercise to continue maintenance of gains made in physical therapy.  5/21/24    ongoing   7. Pt will ascend/descend 2 flights of stairs 1 handrail with supervision reciprocal pattern 5/21/24 NEW        Plan   Updated Certification Period: 5/21/24-  7/3/24     Outpatient Physical Therapy 2 times weekly for 6 weeks to include the following interventions: Gait Training, Neuromuscular Re-ed, Patient Education, Therapeutic Activities, and Therapeutic Exercise.     Estela Ann, PT

## 2024-05-27 ENCOUNTER — CLINICAL SUPPORT (OUTPATIENT)
Dept: REHABILITATION | Facility: HOSPITAL | Age: 19
End: 2024-05-27
Payer: COMMERCIAL

## 2024-05-27 DIAGNOSIS — R27.8 IMPAIRED GROSS MOTOR COORDINATION: Primary | ICD-10-CM

## 2024-05-27 PROCEDURE — 97110 THERAPEUTIC EXERCISES: CPT | Mod: PO,CQ

## 2024-05-27 PROCEDURE — 97530 THERAPEUTIC ACTIVITIES: CPT | Mod: PO,CQ

## 2024-05-27 NOTE — PROGRESS NOTES
"[facility]   Physical Therapy Treatment Note      Name: Allyson Estrada  [facility] Number: [id number]    Therapy Diagnosis:   Encounter Diagnosis   Name Primary?    Impaired gross motor coordination Yes     Physician: Jude Méndez MD    Visit Date: 5/27/2024    Physician Orders: PT Eval and Treat Neuro Program   Medical Diagnosis from Referral: Anoxic brain injury [G93.1]   Evaluation Date: 2/26/2024  Authorization Period Expiration: 2/18/2025  Plan of Care Expiration: 7/3/24  Progress Note Due: 6/21/24  Date of Surgery: NA  Visit # / Visits authorized: 18/20 (19)  FOTO: 1/3      Time In: 1555 (early in)  Time Out: 1635  Total Billable Time: 40 minutes        Precautions: Standard, Fall, and developmental delay     PTA Visit #: 1/5     Subjective     Patient reports: "Okay".  He reports was not compliant with home exercise program.  Response to previous treatment: no problems stated  Functional change: ongoing    Pain: 0/10  Location:  Not Applicable     Objective      Objective Measures updated at progress report unless specified.     Treatment     Emerson received the treatments listed below:      therapeutic exercises to develop strength and endurance for 15 minutes including:    Recumbent bike Level 4 10 minutes    Shuttle:  56# Bilateral leg press 3 minutes with verbal cues/tactile cues to improve knee extension without hyper extension    therapeutic activities to improve functional performance for 25  minutes, including:    Ambulation without assistive device, up/down 21 steps step through, with use of Right rail going up and Bilateral rails going down    Parallel bars:  Single leg stand 3x30 seconds Right Left with occasional upper extremity support  Side step up/over 8 inch box Right Left 2 minutes    Sit<>stand 5 times: 16 seconds    (Activity time includes skilled set-up and positioning as well as therapeutic rest)    Patient Education and Home Exercises       Education provided:   - " Patient provided with verbal and demonstrative instruction for all activities performed in today's session.    Written Home Exercises Provided: Patient instructed to cont prior HEP.     See Electronic Medical Record under Patient Instructions for exercises provided during therapy sessions    Assessment     Emerson provided good participation and effort during today's session with treatment focused on lower extremity strengthening and functional mobility to improve safety with ambulation and transfers. Emerson tolerated activities with seated rest breaks, did well with Unilateral hand rail going up steps, seemed apprehensive going down steps but able to  francine once started.    Emerson Is progressing towards his goals.   Patient prognosis is Good.     Patient will continue to benefit from skilled outpatient physical therapy to address the deficits listed in the problem list box on initial evaluation, provide pt/family education and to maximize pt's level of independence in the home and community environment.     Patient's spiritual, cultural and educational needs considered and pt agreeable to plan of care and goals.     Anticipated barriers to physical therapy: none at this time.    Goals:     Short Term Goals: (2 weeks) Date Last Assessed Status:    1. Patient will be provided with an individualized home exercise program.  4/22/2024    MET   2.  Patient will demonstrate improve endurance and activity tolerance as evidenced by ability to perform Nu-step x 15 minutes without rests breaks with heart rate post-activity equivalent to 50-80% of maximum heart rate.  5/21/24    MET         Long Term Goals: (8 weeks) Date last assessed: Status:    1. Patient will be independent and compliant with updated home exercise program. 5/21/24       ongoing   2.  Patient will increase his gait speed as assessed by the Timed 10 Meter Walk Test from 1.2 m/s to >1.4 m/s to increase his safety and independence with gait at a community  level. (Minimal Clinically Important Difference for older adults = 0.13 m/s)    5/21/24       ongoing      DISCONTINUED   4. Patient will maintain bilateral single leg stance for at least 10 seconds to improve standing balance and overall safety with functional tasks.  5/21/24    ongoing   5. Patient will perform 5 sit to stands in < 12 seconds to demonstrate a reduction in fall risk.  5/21/24    ongoing   6. Patient will begin some form of community fitness to begin regular and consistent performance of exercise to continue maintenance of gains made in physical therapy.  5/21/24    ongoing   7. Pt will ascend/descend 2 flights of stairs 1 handrail with supervision reciprocal pattern 5/21/24 progressing        Plan     Updated Certification Period: 5/21/24-  7/3/24     Outpatient Physical Therapy 2 times weekly for 6 weeks to include the following interventions: Gait Training, Neuromuscular Re-ed, Patient Education, Therapeutic Activities, and Therapeutic Exercise.        Felicitas Tucker, PTA

## 2024-05-28 ENCOUNTER — CLINICAL SUPPORT (OUTPATIENT)
Dept: REHABILITATION | Facility: HOSPITAL | Age: 19
End: 2024-05-28
Payer: COMMERCIAL

## 2024-05-28 DIAGNOSIS — R47.1 DYSARTHRIA: Primary | ICD-10-CM

## 2024-05-28 PROCEDURE — 92507 TX SP LANG VOICE COMM INDIV: CPT | Mod: PO

## 2024-05-28 NOTE — PROGRESS NOTES
[facility]  Speech Therapy Treatment Note- Neurological Rehabilitation  UPDATE Plan of Care     Date: 5/28/2024     Name: Allyson Estrada   MRN: [id number]                                                                            Therapy Diagnosis:   Encounter Diagnosis   Name Primary?    Dysarthria Yes     Physician: Jude Méndez MD  Physician Orders: Ambulatory Referral to Speech Therapy   Medical Diagnosis: Anoxic brain injury [G93.1]     Visit #/ Visits Authorized: 9/ 20  Date of Evaluation:  2/27/2024  Insurance Authorization Period: 2/28/2024 - 12/31/2024   Plan of Care Expiration Date:    4/23/2024, 5/14/2024 - this note  Extended Plan of Care:  not applicable    Progress Note: due 5/14/2024 - this note     Time In:  1615  Time Out:  1655  Total Billable Time: 40    Precautions: Standard and Communication  Subjective:   Patient reports: No major changes from previous session. Patient seen by ENT on 5/3/2024 - see results of laryngoscope below. Patient bought The Breather and brought to session today to initiate use/protocol.    Per ENT note 5/3/2024:  Procedure  Flexible Fiberoptic Laryngoscopy  Indications: Need for detailed exam, hyperactive gag reflex, inadequate mirror visualization, dysarthria, breathing problem, developmental delay, clearance for speech therapy  Anesthesia: Topical Afrin/Tetracaine solution in bilateral nostril  Flexible Fiberoptic Laryngoscopy: The flexible fiberoptic laryngoscope  was passed through the bilateral nostril.   The following findings were appreciated:  Nose: Mucosa congested bilaterally, turbinate enlarged bilaterally, drainage present right side, septum straight    Nasopharynx: adenoids 2+, no obvious eustachian tube orifice obstruction    Oropharynx: No significant cobblestoning or erythema    Larynx: Vocal cords have normal appearance bilaterally and full motion bilateral. They do move extremely lateral but then come all the way together medially when he is voicing. No obstruction of movement. No mass or lesions. Immediate subglottis clear    Hypopharynx: No pooling of secretions or lesions    The patient tolerated the procedure well.  Complications: None     Assessment & plan  1. Speech therapy device consideration: The patient's history of brain injury and the subsequent impact on speech development has led to the current consideration of using devices to aid in speech therapy. The primary goal is to enhance breath support, which is anticipated to facilitate further speech development. A detailed evaluation of the patient's throat and vocal cords using a camera was conducted showing no obvious structural anatomical abnormalities. Cleared for use of devices as requested today.  - Consider pulmonology in the future if no improvement after speech therapy  - Consider using Flonase each side the nose trial for a month to see if it improves nasal congestion     He was compliant to home exercise program.   Response to previous treatment: good  Pain Scale: no pain indicated throughout session  Objective:         UNTIMED  Speech- Language- Voice Therapy     Short Term Goals: (6 weeks) Current Progress:   Patient will complete RBANS assessment to determine further speech therapy Plan of Care Goal Met 3/4/2024     2. Patient will recall 4/4 clear speech strategies with minimal assist      Progressing/ Not Met 5/28/2024    GOAL DISCONTINUED 5/28/2024     3. Patient will read aloud low  "level passage with independently use of clear speech strategies with 80% accuracy independently.       Progressing/ Not Met 5/28/2024    Not addressed in today's session.      4. Patient will complete diaphragmatic breathing exercises by producing maximal exhalation via diaphragmic breathing with a duration of 5 seconds in 10 trials, consistently with minimal tactile and verbal clinician cues.       Progressing/ Not Met 5/28/2024    GOAL DISCONTINUED 5/28/2024   5. Patient will complete ENT assessment for clearance of PhoRTE exercises in speech therapy.      GOAL MET 5/3/2024   6. Patient will name objects with 90% accuracy independently.  Goal Met 4/17/2024     7. Patient will state the function of objects using 3+ word sentences with minimal cues.     Progressing/Not Met 5/28/2024  GOAL DISCONTINUED 5/28/2024   NEW GOAL 5/28/2024 8. Patient will successfully obtain respiratory muscle strength training (RMST) device and set baseline cmH20 level with functional effort level from Effort Scale.   GOAL MET 5/28/2024   NEW GOAL 5/28/2024 9. Patient will complete 2 breath sets/10 repetitions while following The Breather: Strength Training protocol independently to improve overall respiratory strength.     Progressing/Not Met 5/28/2024  Initiated today. Speech Language Pathologist assisted with program of device to start at level 3.. Patient completed on level 3 in session today.    NEW GOAL 5/28/2024 10. Patient will maintain "In the Zone" effort (5-7 out of 10 on the Effort Scale) during The Breather: Strength Training to increase overall respiratory strength .    Progressing/Not Met 5/28/2024  Patient reports completing at "medium level" in session today.    NEW GOAL 5/28/2024 11.Patient will rate his speech while reading as at least a 2 on a 3 point scale ( 1 = same as before beginning therapy, 2 =better but not best, 3 =best possible outcome) on 7 /10 trials.     Progressing/Not Met 5/28/2024  Initiated today. " Patient developed list of sentence with Speech Language Pathologist today. Patient read aloud with rating 2 across 10/10 trials. Patient needed moderate verbal cues to improve use of diaphragmatic breathing in tasks.        Patient Education/Response:   Patient, mom, and step dad educated regarding the followin. Using his loud voice when communicating   2. The Breather protocol and reasoning  3. Speech therapy Plan of Care     Home program established: yes-use of loud voice  Patient verbalized understanding to all above education provided.     See Electronic Medical Record under Patient Instructions for exercises provided throughout therapy.  Assessment:   Patient with needing significant cues throughout the session for use of loud voice. Initiated use of The Breather following recent clearance from ENT. Patient needing moderate cues in tasks today to improve breath support in tasks. Patient's mother and step dad completed education regarding speech therapy Plan of Care/availability, The Breather protocol, and how to use device - parents voiced understanding. Patient provided written out guideline of The Breather protocol and list of sentences generated today with Speech Language Pathologist.     Emerson is progressing well towards his goals. Current goals remain appropriate. Goals to be updated as necessary.     Patient prognosis is Good. Patient will continue to benefit from skilled outpatient speech and language therapy to address the deficits listed in the problem list on initial evaluation, provide patient/family education and to maximize patient's level of independence in the home and community environment.   Medical necessity is demonstrated by the following IMPAIRMENTS:  Aphasia: decreased content words and significant word finding difficulty in all situations severely limiting functional communication with both familiar and unfamiliar communication partners to relay medically and safety relevant  information in a timely manner in a state of emergency.    Motor speech: Decreased speech intelligibility results in decreased efficiency communicating medical and social wants and needs in the case of emergency.    Barriers to Therapy: none identified   Patient's spiritual, cultural and educational needs considered and patient agreeable to plan of care and goals.  Plan:   Continued speech therapy to focus on breath support with use of The Breather to improve dysarthria.     Hilda Diop CCC-SLP   5/28/2024

## 2024-05-28 NOTE — PLAN OF CARE
[facility]  Speech Therapy Updated Plan of Care-Neurological Rehabilitation         Date: 5/28/2024   Name: Allyson Estrada  Clinic Number: [id number]    Therapy Diagnosis:   Encounter Diagnosis   Name Primary?    Dysarthria Yes     Physician: Jude Méndez MD    Physician Orders: Ambulatory Referral to Speech Therapy   Medical Diagnosis: Anoxic brain injury [G93.1]     Visit #/ Visits Authorized:  9 /20   Evaluation Date: 2/27/2024  Insurance Authorization Period: 2/28/2024 - 12/31/2024  Plan of Care Expiration:    5/14/2024  New POC Certification Period:  7/2/2024 - 8/13/2024    Total Visits Received: 8    Precautions:Standard  Subjective     Update: Patient with needing significant cues throughout the session for use of loud voice. Initiated use of The Breather following recent clearance from ENT. Patient needing moderate cues in tasks today to improve breath support in tasks. Patient's mother and step dad completed education regarding speech therapy Plan of Care/availability, The Breather protocol, and how to use device - parents voiced understanding. Patient provided written out guideline of The Breather protocol and list of sentences generated today with Speech Language Pathologist.     Objective     Update: see follow up note dated 5/28/2024    Assessment     Update: Allyson Estrada presents to [facility] status post medical diagnosis of Anoxic brain injury [G93.1] . Demonstrates impairments including limitations as described in the problem list. Positive prognostic factors include good family support. Negative prognostic factors include history of developmental delays. He presents with moderate-severe dysarthria characterized by low vocal volume and imprecise articulation. No barriers to therapy identified.. Patient will benefit from skilled, outpatient rehabilitation speech therapy.    Rehab Potential: fair   Pt's spiritual, cultural, and educational needs considered and  patient agreeable to plan of care and goals.    Education: Plan of Care, role of SLP in care, motor speech strategies, course of medical disease affect on therapy diagnosis , scheduling/ cancellation policy, and insurance limitations / visit limit  - patient and family voiced understanding with all education provided across sessions     Previous Short Term Goals Status: 6 weeks  Short Term Goals: Current Progress:   Patient will complete RBANS assessment to determine further speech therapy Plan of Care Goal Met 3/4/2024     2. Patient will recall 4/4 clear speech strategies with minimal assist      Progressing/Not Met       3. Patient will read aloud low level passage with independently use of clear speech strategies with 80% accuracy independently.       Progressing/Not Met    4. Patient will complete diaphragmatic breathing exercises by producing maximal exhalation via diaphragmic breathing with a duration of 5 seconds in 10 trials, consistently with minimal tactile and verbal clinician cues.       Progressing/Not Met    5. Patient will complete ENT assessment for clearance of PhoRTE exercises in speech therapy.        Progressing/Not Met    6. Patient will name objects with 90% accuracy independently.  Goal Met 4/17/2024     7. Patient will state the function of objects using 3+ word sentences with minimal cues.     Progressing/Not Met         New Short Term Goals: 4 weeks  Short Term Goals:  Current Progress:   Patient will complete RBANS assessment to determine further speech therapy Plan of Care Goal Met 3/4/2024     2. Patient will recall 4/4 clear speech strategies with minimal assist      Progressing/ Not Met 5/28/2024    GOAL DISCONTINUED 5/28/2024      3. Patient will read aloud low level passage with independently use of clear speech strategies with 80% accuracy independently.       Progressing/ Not Met 5/28/2024    Not addressed in today's session.       4. Patient will complete diaphragmatic breathing  "exercises by producing maximal exhalation via diaphragmic breathing with a duration of 5 seconds in 10 trials, consistently with minimal tactile and verbal clinician cues.       Progressing/ Not Met 5/28/2024    GOAL DISCONTINUED 5/28/2024   5. Patient will complete ENT assessment for clearance of PhoRTE exercises in speech therapy.      GOAL MET 5/3/2024   6. Patient will name objects with 90% accuracy independently.  Goal Met 4/17/2024     7. Patient will state the function of objects using 3+ word sentences with minimal cues.      Progressing/Not Met 5/28/2024  GOAL DISCONTINUED 5/28/2024   NEW GOAL 5/28/2024 8. Patient will successfully obtain respiratory muscle strength training (RMST) device and set baseline cmH20 level with functional effort level from Effort Scale.   GOAL MET 5/28/2024   NEW GOAL 5/28/2024 9. Patient will complete 2 breath sets/10 repetitions while following The Breather: Strength Training protocol independently to improve overall respiratory strength.    GOAL INITIATED 5/28/2024   NEW GOAL 5/28/2024 10. Patient will maintain "In the Zone" effort (5-7 out of 10 on the Effort Scale) during The Breather: Strength Training to increase overall respiratory strength .   GOAL INITIATED 5/28/2024   NEW GOAL 5/28/2024 11.Patient will rate his speech while reading as at least a 2 on a 3 point scale ( 1 = same as before beginning therapy, 2 =better but not best, 3 =best possible outcome) on 7 /10 trials.  GOAL INITIATED 5/28/2024        Long Term Goal Status:  6 weeks  Long Term Goals: Current Progress:   He  will develop functional and intelligible speech and utilize compensatory strategies through the use of adequate labial and lingual function, increased articulatory precision and speech prosody.    Progressing/Not Met    2. He will use appropriate memory strategies to schedule and recall weekly activities, express needs and recall names to maintain safety and participate socially in functional " living environment.       Progressing/Not Met    3. He  will develop functional cognitive-linguistic based skills and utilize compensatory strategies to communicate wants and needs effectively to different conversational partners, maintain safety, and participate socially in functional living environment.        Progressing/Not Met    4. He  will demonstrate improved vocal quality and intonation at the conversation level to communicate basic medical and social needs in the functional living environment.       Progressing/Not Met        Goals Previously Met:  N/a     Reasons for Recertification of Therapy: initiated The Breather in attempt to improve breath support needed for motor speech     Plan     Updated Certification Period: 7/2/2024 - 8/13/2024 (delayed Plan of Care given limited availability with required time for patient per family)     Recommended Treatment Plan: Patient will participate in the [facility] rehabilitation program for speech therapy 1 times per week for 6 weeks (adjusted since initial Plan of Care) to address his Communication deficits, to educate patient and their family, and to participate in a home exercise program.     Other recommendations: n/a     Therapist's Name:  Hilda Diop CCC-SLP   5/28/2024      I CERTIFY THE NEED FOR THESE SERVICES FURNISHED UNDER THIS PLAN OF TREATMENT AND WHILE UNDER MY CARE      Physician Name: _______________________________    Physician Signature: ____________________________

## 2024-06-18 ENCOUNTER — CLINICAL SUPPORT (OUTPATIENT)
Dept: REHABILITATION | Facility: HOSPITAL | Age: 19
End: 2024-06-18
Payer: COMMERCIAL

## 2024-06-18 DIAGNOSIS — R27.8 IMPAIRED GROSS MOTOR COORDINATION: Primary | ICD-10-CM

## 2024-06-18 PROCEDURE — 97110 THERAPEUTIC EXERCISES: CPT | Mod: PO

## 2024-06-18 PROCEDURE — 97530 THERAPEUTIC ACTIVITIES: CPT | Mod: PO

## 2024-06-18 NOTE — PROGRESS NOTES
"[facility]   Physical Therapy Treatment Note / progress note     Name: Allyson Estrada  Clinic Number: [id number]    Therapy Diagnosis:   Encounter Diagnosis   Name Primary?    Impaired gross motor coordination Yes       Physician: Jude Méndez MD    Visit Date: 6/18/2024    Physician Orders: PT Eval and Treat Neuro Program   Medical Diagnosis from Referral: Anoxic brain injury [G93.1]   Evaluation Date: 2/26/2024  Authorization Period Expiration: 2/18/2025  Plan of Care Expiration: 7/3/24  Progress Note Due:7/3/24  Date of Surgery: NA  Visit # / Visits authorized: 19/20 (20)  FOTO: 1/3      Time In: 4:01 pm   Time Out: 4:45 pm   Total Billable Time: 44 minutes        Precautions: Standard, Fall, and developmental delay     PTA Visit #: 0/5     Subjective     Patient reports:"good".     He reports was not compliant with home exercise program.  Response to previous treatment: no problems stated  Functional change: ongoing    Pain: 0/10    Location:  Not Applicable     Objective      Objective Measures updated at progress report unless specified.     Treatment     Emerson received the treatments listed below:      therapeutic exercises to develop strength and endurance for 24 minutes including:      Sci fit  Level 4 10 minutes bilateral upper extremity/ lower extremity     Shuttle:  - Bilateral lower extremity 100# 3x10   - shuttle leg press single leg press 62# 3x10 each lower extremity   - calf raises 62# 3x10 bilateral  lower extremity with cues for form.     therapeutic activities to improve functional performance for 20  minutes, including:    - pt ascended / descended 44 stairs 1 handrail reciprocal pattern going up and step to going down supervision.     APTA Core Set Outcome Measures for Adults with Neurologic Conditions                    Evaluation 2/26/24 Progress Note   3/26/24 Plan of Care Update 4/22/24 5/21/24 6/18/24- progress note.  Reference values    Timed Up and Go 10.3 " "sec (average of 3 trials);    10.44 sec      Trial 1) 11.09  Trial 2) 10.10  Trial 3) 9.22  10.58 sec     Trial 1) 11.2  Trial 2) 10.45  Trial 3) 10.09  9.8 sec no assistive device  9.1 seconds no assistive device  score >14 seconds indicates risk for falls in older stroke patients (Leif et al, 2006)   10 Meter Walk Test  1.2 m/sec (6m/5s)   "community ambulator" speed category 1.00 m/s (6m/ 5.99s) 1.19 (6m/ 5.06 sec)  1.06 m/s (6m/5.64 sec)   1.11 m/s (6m/ 5.4 sec)  0-0.4 m/s = household walker  0.4-0.8 m/s = limited community ambulator   0.8-1.4 m/s = community ambultor  >1.4 m/s = can cross street safely    Functional Gait Assessment  19/30 14/30 21/30  Discontinued (unable to follow commands)  <22/30 = identifies fall risk in community dwelling older adults   (MCID = 4)   5 times sit-stand    16 seconds    13.25 seconds  14.73 seconds 16.7 sec 13.6 seconds >12 sec= fall risk for general elderly  >10 sec= balance/vestibular dysfunction (<59 y/o)  >14.2 sec= balance/vestibular dysfunction (>59 y/o)  >12 sec= fall risk for stroke        (Activity time includes skilled set-up and positioning as well as therapeutic rest)    Patient Education and Home Exercises       Education provided:     - educated about progress. Reports understanding.     Written Home Exercises Provided: Patient instructed to cont prior HEP.     See Electronic Medical Record under Patient Instructions for exercises provided during therapy sessions    Assessment      Pt tolerated session well. Progress note completed to reassess progress towrds goals. Pt improved in his 5x sit to stand score, self selected walking speed, and TUG score. Pt had a decline in functional scores last POC and today was closed to baseline scores from a few months ago. However, has minimally progressed since April 2024. Pt remains motivated and a good candidate for PT.       Emerson Is progressing towards his goals.   Patient prognosis is Good.     Patient will continue " to benefit from skilled outpatient physical therapy to address the deficits listed in the problem list box on initial evaluation, provide pt/family education and to maximize pt's level of independence in the home and community environment.     Patient's spiritual, cultural and educational needs considered and pt agreeable to plan of care and goals.     Anticipated barriers to physical therapy: none at this time.    Goals:     Short Term Goals: (2 weeks) Date Last Assessed Status:    1. Patient will be provided with an individualized home exercise program.  4/22/2024    MET   2.  Patient will demonstrate improve endurance and activity tolerance as evidenced by ability to perform Nu-step x 15 minutes without rests breaks with heart rate post-activity equivalent to 50-80% of maximum heart rate.  5/21/24    MET         Long Term Goals: (8 weeks) Date last assessed: Status:    1. Patient will be independent and compliant with updated home exercise program. 5/21/24       ongoing   2.  Patient will increase his gait speed as assessed by the Timed 10 Meter Walk Test from 1.2 m/s to >1.4 m/s to increase his safety and independence with gait at a community level. (Minimal Clinically Important Difference for older adults = 0.13 m/s)    5/21/24       ongoing      DISCONTINUED   4. Patient will maintain bilateral single leg stance for at least 10 seconds to improve standing balance and overall safety with functional tasks.  5/21/24    ongoing   5. Patient will perform 5 sit to stands in < 12 seconds to demonstrate a reduction in fall risk.  5/21/24    ongoing   6. Patient will begin some form of community fitness to begin regular and consistent performance of exercise to continue maintenance of gains made in physical therapy.  5/21/24    ongoing   7. Pt will ascend/descend 2 flights of stairs 1 handrail with supervision reciprocal pattern 5/21/24 progressing        Plan     Updated Certification Period: 5/21/24-  7/3/24      Outpatient Physical Therapy 2 times weekly for 6 weeks to include the following interventions: Gait Training, Neuromuscular Re-ed, Patient Education, Therapeutic Activities, and Therapeutic Exercise.        Estela Ann, PT

## 2024-06-25 ENCOUNTER — CLINICAL SUPPORT (OUTPATIENT)
Dept: REHABILITATION | Facility: HOSPITAL | Age: 19
End: 2024-06-25
Payer: COMMERCIAL

## 2024-06-25 DIAGNOSIS — R27.8 IMPAIRED GROSS MOTOR COORDINATION: Primary | ICD-10-CM

## 2024-06-25 PROCEDURE — 97112 NEUROMUSCULAR REEDUCATION: CPT | Mod: PO

## 2024-06-25 PROCEDURE — 97110 THERAPEUTIC EXERCISES: CPT | Mod: PO

## 2024-06-25 NOTE — PROGRESS NOTES
"[facility]   Physical Therapy Treatment Note      Name: Allyson Estrada  Clinic Number: [id number]    Therapy Diagnosis:   Encounter Diagnosis   Name Primary?    Impaired gross motor coordination Yes       Physician: Jude Méndez MD    Visit Date: 6/25/2024    Physician Orders: PT Eval and Treat Neuro Program   Medical Diagnosis from Referral: Anoxic brain injury [G93.1]   Evaluation Date: 2/26/2024  Authorization Period Expiration: 2/18/2025  Plan of Care Expiration: 7/3/24  Progress Note Due:7/3/24  Date of Surgery: NA  Visit # / Visits authorized: 20/ 20 (21)  FOTO: 1/3      Time In: 4:02 pm   Time Out: 4:45 pm   Total Billable Time: 43 minutes        Precautions: Standard, Fall, and developmental delay     PTA Visit #: 0/5     Subjective     Patient reports: "good". Reports all is going well.    He reports was not compliant with home exercise program.  Response to previous treatment: no problems stated  Functional change: ongoing    Pain: 0/10    Location:  Not Applicable     Objective      Objective Measures updated at progress report unless specified.     Treatment     Emerson received the treatments listed below:      therapeutic exercises to develop strength and endurance for 15 minutes including:      - Sci fit  Level 5 10 minutes bilateral upper extremity/ lower extremity   - standing calf stretch on foam half roll 30 sec x 3 trials each lower extremity      (Therapeutic rest breaks throughout as needed).       Emerson participated in neuromuscular re-education activities to improve: Balance and Coordination for 28 minutes. The following activities were included:     - pt ascended / descended 44 stairs 1 handrail reciprocal pattern going up and step to going down supervision.  Slower on the descent.      Bosu:   - standing on round side of BOSU 30 sec x 3 trials with frequent 1 upper extremity support on bars for balance.   - stepping forward to round side BOSU 2x10 each lower " extremity with frequent 1 upper extremity support on bars for balance.   - side stepping to round side BOSU 2x10 each lower extremity with frequent 1 upper extremity support on bars for balance.     - step downs from 4 inch step 3x10 each lower extremity for eccentric control    (Activity time includes skilled set-up and positioning as well as therapeutic rest)    Patient Education and Home Exercises       Education provided:     - educated about stairs and eccentric control. Will continue to educate.     Written Home Exercises Provided: Patient instructed to cont prior HEP.     See Electronic Medical Record under Patient Instructions for exercises provided during therapy sessions    Assessment        Pt tolerated session well. Working on stair navigation and step downs on stairs to improve eccentric control of quads for controlling descent on stairs. Pt also working on balance with BOSU. Pt remains motivated to improve and a good candidate for PT.       Emerson Is progressing towards his goals.   Patient prognosis is Good.     Patient will continue to benefit from skilled outpatient physical therapy to address the deficits listed in the problem list box on initial evaluation, provide pt/family education and to maximize pt's level of independence in the home and community environment.     Patient's spiritual, cultural and educational needs considered and pt agreeable to plan of care and goals.     Anticipated barriers to physical therapy: none at this time.    Goals:     Short Term Goals: (2 weeks) Date Last Assessed Status:    1. Patient will be provided with an individualized home exercise program.  4/22/2024    MET   2.  Patient will demonstrate improve endurance and activity tolerance as evidenced by ability to perform Nu-step x 15 minutes without rests breaks with heart rate post-activity equivalent to 50-80% of maximum heart rate.  5/21/24    MET         Long Term Goals: (8 weeks) Date last assessed: Status:     1. Patient will be independent and compliant with updated home exercise program. 5/21/24       ongoing   2.  Patient will increase his gait speed as assessed by the Timed 10 Meter Walk Test from 1.2 m/s to >1.4 m/s to increase his safety and independence with gait at a community level. (Minimal Clinically Important Difference for older adults = 0.13 m/s)    5/21/24       ongoing      DISCONTINUED   4. Patient will maintain bilateral single leg stance for at least 10 seconds to improve standing balance and overall safety with functional tasks.  5/21/24    ongoing   5. Patient will perform 5 sit to stands in < 12 seconds to demonstrate a reduction in fall risk.  5/21/24    ongoing   6. Patient will begin some form of community fitness to begin regular and consistent performance of exercise to continue maintenance of gains made in physical therapy.  5/21/24    ongoing   7. Pt will ascend/descend 2 flights of stairs 1 handrail with supervision reciprocal pattern 5/21/24 progressing        Plan     Updated Certification Period: 5/21/24-  7/3/24     Outpatient Physical Therapy 2 times weekly for 6 weeks to include the following interventions: Gait Training, Neuromuscular Re-ed, Patient Education, Therapeutic Activities, and Therapeutic Exercise.        Estela Ann, PT

## 2024-07-01 NOTE — PROGRESS NOTES
[facility]  Speech Therapy Treatment Note- Neurological Rehabilitation  PROGRESS NOTE    Date: 7/2/2024     Name: Allyson Estrada   MRN: [id number]                                                                            Therapy Diagnosis:   Encounter Diagnosis   Name Primary?    Dysarthria Yes     Physician: Jude Méndez MD  Physician Orders: Ambulatory Referral to Speech Therapy   Medical Diagnosis: Anoxic brain injury [G93.1]     Visit #/ Visits Authorized: 9/ 20  Date of Evaluation:  2/27/2024  Insurance Authorization Period: 2/28/2024 - 12/31/2024   Plan of Care Expiration Date:    4/23/2024, 5/14/2024, 8/13/2024  Extended Plan of Care:  not applicable    Progress Note: due 6/25/2024 - this note     Time In:  1530  Time Out:  1605  Total Billable Time: 35    Precautions: Standard and Communication  Subjective:   Patient reports: No major changes from previous session. Patient did not bring Breather to session today so focused on speech intelligibility in conversation/structured tasks. Patient's mother reports patient has been continuing use of Breather at home as previously instructed in last speech therapy session with no difficulty - patient's mother reports patient with improvement in speech intelligibility since use. In conversation with Speech Language Pathologist today and in tasks, limited improvement in speech intelligibility noted since last speech therapy session.     He was compliant to home exercise  program.   Response to previous treatment: good  Pain Scale: no pain indicated throughout session  Objective:         UNTIMED  Speech- Language- Voice Therapy     Short Term Goals: (6 weeks) Current Progress:   Patient will complete RBANS assessment to determine further speech therapy Plan of Care Goal Met 3/4/2024     2. Patient will recall 4/4 clear speech strategies with minimal assist      Progressing/ Not Met 7/2/2024    GOAL DISCONTINUED 5/28/2024     3. Patient will read aloud low level passage with independently use of clear speech strategies with 80% accuracy independently.       Progressing/ Not Met 7/2/2024    See goal 11 below    4. Patient will complete diaphragmatic breathing exercises by producing maximal exhalation via diaphragmic breathing with a duration of 5 seconds in 10 trials, consistently with minimal tactile and verbal clinician cues.       Progressing/ Not Met 7/2/2024    GOAL DISCONTINUED 5/28/2024   5. Patient will complete ENT assessment for clearance of PhoRTE exercises in speech therapy.      GOAL MET 5/3/2024   6. Patient will name objects with 90% accuracy independently.  Goal Met 4/17/2024     7. Patient will state the function of objects using 3+ word sentences with minimal cues.     Progressing/Not Met 7/2/2024  GOAL DISCONTINUED 5/28/2024   NEW GOAL 5/28/2024 8. Patient will successfully obtain respiratory muscle strength training (RMST) device and set baseline cmH20 level with functional effort level from Effort Scale.   GOAL MET 5/28/2024   NEW GOAL 5/28/2024 9. Patient will complete 2 breath sets/10 repetitions while following The Breather: Strength Training protocol independently to improve overall respiratory strength.     Progressing/Not Met 7/2/2024  -Not addressed this session given Breather not present. Speech Language Pathologist voiced to patient's mother to bring next session - patient's mother voiced understanding and will bring next session.   NEW GOAL 5/28/2024 10.  "Patient will maintain "In the Zone" effort (5-7 out of 10 on the Effort Scale) during The Breather: Strength Training to increase overall respiratory strength .    Progressing/Not Met 2024  Not addressed this session given Breather not present. Speech Language Pathologist voiced to patient's mother to bring next session - patient's mother voiced understanding and will bring next session.     NEW GOAL 2024 11.Patient will rate his speech while reading as at least a 2 on a 3 point scale ( 1 = same as before beginning therapy, 2 =better but not best, 3 =best possible outcome) on 7 /10 trials.     Progressing/Not Met 2024  Patient developed list of sentence with Speech Language Pathologist today. Patient read aloud with rating 2 across /15 trials and rating of 3 with 10/15. Patient needed moderate verbal cues to improve use of diaphragmatic breathing in tasks.        Patient Education/Response:   Patient, mom, and step dad educated regarding the followin. Using his loud voice when communicating   2. The Breather protocol and reasoning as well as bringing Breather to speech therapy sessions  3. Speech therapy Plan of Care     Home program established: Patient instructed to continue prior program  Patient verbalized understanding to all above education provided.     See Electronic Medical Record under Patient Instructions for exercises provided throughout therapy.  Assessment:   Patient with needing significant cues throughout the session for use of loud voice. Initiated use of The Breather following recent clearance from ENT last speech therapy session. Patient needing moderate cues in tasks today to improve breath support in tasks. Patient's mother completed education regarding speech therapy Plan of Care/availability, The Breather protocol, and how to use device - parent voiced understanding. Patient provided written out guideline of The Breather protocol and list of sentences generated today with " Speech Language Pathologist last speech therapy session. Patient's speech intelligibility roughly the same since last seen in speech therapy.     Emerson is progressing well towards his goals. Current goals remain appropriate. Goals to be updated as necessary.     Patient prognosis is Good. Patient will continue to benefit from skilled outpatient speech and language therapy to address the deficits listed in the problem list on initial evaluation, provide patient/family education and to maximize patient's level of independence in the home and community environment.   Medical necessity is demonstrated by the following IMPAIRMENTS:  Motor speech: Decreased speech intelligibility results in decreased efficiency communicating medical and social wants and needs in the case of emergency.    Barriers to Therapy: none identified   Patient's spiritual, cultural and educational needs considered and patient agreeable to plan of care and goals.  Plan:   Continued speech therapy to focus on breath support with use of The Breather to improve dysarthria.     Hilda Diop CCC-SLP   7/2/2024

## 2024-07-02 ENCOUNTER — CLINICAL SUPPORT (OUTPATIENT)
Dept: REHABILITATION | Facility: HOSPITAL | Age: 19
End: 2024-07-02
Payer: COMMERCIAL

## 2024-07-02 DIAGNOSIS — R47.1 DYSARTHRIA: Primary | ICD-10-CM

## 2024-07-02 DIAGNOSIS — R27.8 IMPAIRED GROSS MOTOR COORDINATION: Primary | ICD-10-CM

## 2024-07-02 PROCEDURE — 97530 THERAPEUTIC ACTIVITIES: CPT | Mod: PO

## 2024-07-02 PROCEDURE — 97110 THERAPEUTIC EXERCISES: CPT | Mod: PO

## 2024-07-02 PROCEDURE — 92507 TX SP LANG VOICE COMM INDIV: CPT | Mod: PO

## 2024-07-03 NOTE — PLAN OF CARE
"[facility]   Physical Therapy Discharge Summary     Name: Allyson Estrada  [facility] Number: [id number]    Therapy Diagnosis:   Encounter Diagnosis   Name Primary?    Impaired gross motor coordination Yes         Physician: Jude Méndez MD    Visit Date: 7/2/2024    Physician Orders: PT Eval and Treat Neuro Program   Medical Diagnosis from Referral: Anoxic brain injury [G93.1]   Evaluation Date: 2/26/2024  Authorization Period Expiration: 2/18/2025  Plan of Care Expiration: 7/3/24     Progress Note Due:7/3/24  Date of Surgery: NA  Visit # / Visits authorized: 21/ 32 (22)  FOTO: 1/3      Time In: 4:46 pm   Time Out: 5:30 pm   Total Billable Time: 44 minutes        Precautions: Standard, Fall, and developmental delay     PTA Visit #: 0/5     Subjective     Patient reports: "I am good."    He reports was not compliant with home exercise program.  Response to previous treatment: no problems stated  Functional change: ongoing    Pain: 0/10     Location:  Not Applicable     Objective      Objective Measures updated at progress report unless specified.     Treatment     Emerson received the treatments listed below:      therapeutic activities to improve functional performance for 26  minutes, including:      APTA Core Set Outcome Measures for Adults with Neurologic Conditions                    Evaluation 2/26/24 Progress Note   3/26/24 Plan of Care Update 4/22/24 5/21/24 7/2/24 Reference values    Timed Up and Go 10.3 sec (average of 3 trials);    10.44 sec      Trial 1) 11.09  Trial 2) 10.10  Trial 3) 9.22  10.58 sec     Trial 1) 11.2  Trial 2) 10.45  Trial 3) 10.09  9.8 sec no assistive device  10.1 sec no assistive device  score >14 seconds indicates risk for falls in older stroke patients (Leif et al, 2006)   10 Meter Walk Test  1.2 m/sec (6m/5s)   "community ambulator" speed category 1.00 m/s (6m/ 5.99s) 1.19 (6m/ 5.06 sec)  1.06 m/s (6m/5.64 sec)  1.03 m/s (6m/5.8 sec)  0-0.4 m/s = " household walker  0.4-0.8 m/s = limited community ambulator   0.8-1.4 m/s = community ambultor  >1.4 m/s = can cross street safely    5 times sit-stand    16 seconds    13.25 seconds  14.73 seconds 16.7 sec 12.2 sec >12 sec= fall risk for general elderly  >10 sec= balance/vestibular dysfunction (<61 y/o)  >14.2 sec= balance/vestibular dysfunction (>61 y/o)  >12 sec= fall risk for stroke           therapeutic exercises to develop strength and endurance for 18 minutes including:    - Sci fit  Level 5 10 minutes bilateral upper extremity/ lower extremity     - pt ascended / descended 44 stairs 1 handrail reciprocal pattern going up and step to going down supervision.  Slower on the descent.      (Therapeutic rest breaks throughout as needed).       Patient Education and Home Exercises       Education provided:     - educated pt about reaching maximum potential for therapy at this time. Recommending DC at this time.       Written Home Exercises Provided: Patient instructed to cont prior HEP.     See Electronic Medical Record under Patient Instructions for exercises provided during therapy sessions    Assessment     Pt tolerated session well. Plan of Care Update today to reassess progress towards goals. Pt has met 2/2 short term goals and 4/6 long term goals. Pt has progressed in  his 5x sit to stand score and has progressed his ability to navigate stairs. Pt has plateaued with his other scores. Pt is appropriate for discharge from PT at this time as he has reached max potential for therapy at this time. Recommending pt continue with appropriate fitness program. Pt is discharged from outpatient PT at this time.       Emerson Is progressing towards his goals.   Patient prognosis is Good.     Patient will continue to benefit from skilled outpatient physical therapy to address the deficits listed in the problem list box on initial evaluation, provide pt/family education and to maximize pt's level of independence in the home  and community environment.     Patient's spiritual, cultural and educational needs considered and pt agreeable to plan of care and goals.     Anticipated barriers to physical therapy: none at this time.    Goals:     Short Term Goals: (2 weeks) Date Last Assessed Status:    1. Patient will be provided with an individualized home exercise program.  4/22/2024    MET   2.  Patient will demonstrate improve endurance and activity tolerance as evidenced by ability to perform Nu-step x 15 minutes without rests breaks with heart rate post-activity equivalent to 50-80% of maximum heart rate.  5/21/24    MET         Long Term Goals: (8 weeks) Date last assessed: Status:    1. Patient will be independent and compliant with updated home exercise program. 5/21/24       MET   2.  Patient will increase his gait speed as assessed by the Timed 10 Meter Walk Test from 1.2 m/s to >1.4 m/s to increase his safety and independence with gait at a community level. (Minimal Clinically Important Difference for older adults = 0.13 m/s)    5/21/24       NOT MET      4. Patient will maintain bilateral single leg stance for at least 10 seconds to improve standing balance and overall safety with functional tasks.  5/21/24    NOT MET   5. Patient will perform 5 sit to stands in < 12 seconds to demonstrate a reduction in fall risk.  5/21/24    MET   6. Patient will begin some form of community fitness to begin regular and consistent performance of exercise to continue maintenance of gains made in physical therapy.  5/21/24    MET   7. Pt will ascend/descend 2 flights of stairs 1 handrail with supervision reciprocal pattern 5/21/24 MET        Plan     Pt is appropriate for discharge from PT at this time with HEP and continuing with fitness program.     Estela Ann, PT

## 2024-07-11 ENCOUNTER — CLINICAL SUPPORT (OUTPATIENT)
Dept: REHABILITATION | Facility: HOSPITAL | Age: 19
End: 2024-07-11
Payer: COMMERCIAL

## 2024-07-11 DIAGNOSIS — Z78.9 DEFICITS IN ACTIVITIES OF DAILY LIVING: ICD-10-CM

## 2024-07-11 DIAGNOSIS — R27.8 DECREASED COORDINATION: ICD-10-CM

## 2024-07-11 DIAGNOSIS — R53.1 DECREASED STRENGTH: Primary | ICD-10-CM

## 2024-07-11 PROCEDURE — 97530 THERAPEUTIC ACTIVITIES: CPT | Mod: PO

## 2024-07-11 NOTE — PROGRESS NOTES
[facility]   Occupational Therapy Treatment Note, Updated Plan of Care & Discharge Summary     Name: Allyson Estrada  Clinic Number: [id number]    Therapy Diagnosis:   Encounter Diagnoses   Name Primary?    Decreased strength Yes    Deficits in activities of daily living     Decreased coordination      Physician: Jude Méndez MD    Physician Orders: Eval and Treat  Medical Diagnosis: G93.1 (ICD-10-CM) - Anoxic brain injury   Evaluation Date: 7/11/2024  Insurance Authorization Period Expiration:   Updated Plan of Care Certification Period: 5/28/2024-7/11/2024   Date of Return to MD: To be determined  Visit # / Visits authorized: 14/20 (+eval)  FOTO: 1/ 3, last assessed 4/16 /2024    Precautions:  Standard    Time In: 1735  Time Out: 1820  Total Billable Time: 45 minutes    Subjective     Pt reports: Pt reports he is doing good. Step-dad is with Emerson today and reports he's doing well at home. Emerson is working out independently at home. Brother usually cooks breakfast but he will be going to college in the fall. Step-nyla doesn't think they'll let Emerson use the stove and at this point, Emerson is able to prepare his own cereal, toast, etc. Step-nyla did express concern about how Emerson is holding a cup when getting ice/water from the fridge. Overall, step-nyla states he's doing a lot better and more independent.    Mom reports that he has been practicing cutting food and tying his shoes. He continues to need practice and assistance.     he was compliant with home exercise program given last session (practice tying).   Response to previous treatment: no concerns  Functional change: no concerns    Pain: 0/10  Location: N/a    Limitation of Functional Status as follows:  ADLs/IADLs:     Feeding (eval):   family fixing plate for evening meals (he's fixing his own breakfasts & lunches most of the time); he's still carrying his plate to the table; using butter knife to make peanut butter  "sandwriches    Feeding (7/11/2024):  Does not fix plate, but able to bring plate to table, unable to use knife, fills own water bottle, microwave meals, prepares cereal.    Bathing (eval):   takes standing shower, no assistance to reach his back but pt would like to better reach during shower, no assistance required to enter/exit shower (low threshold).  Bathing (7/11/2024):   no concerns    Dressing (eval):   requires assistance with tying shoes, donning socks, and shoe orientation, difficulty with shirt orientation, requires assistance with buttons.  Dressing (7/11/2024):   donning own socks, states he's "getting better" with putting on his shoes (slipping them on, not tying); still having difficulty with shirt orientation but he's able to correct it; states he can do buttons (with significantly increased time, he was able to undo 1 button and do up 2 buttons in clinic). Emerson is looking at his shoes to make sure they're right and only occasionally needing a cue for his shirt.    Grooming (eval):   requires min a with brushing hair, goes to [facility] for shaving face  Grooming (7/11/2024):  brushing own hair    Home Management (eval):   completes simple meal prep using microwave, makes cereal but no sandwiches due to difficulty gauging portions (condiments/meat), completes chores.  Home Management (7/11/2024):  Still able to use the microwave and make cereal. Able to make a sandwich.  Doing well cutting with a butter knife. He's taking his clothes basket to the garage (family washes) and is completing the chores he was doing before.     Driving: n/a    Bed mobility: (I)    Hygiene (eval):   has trouble with gauging amount of toothpaste  Hygiene 7/11/2024:  "getting better" with the amount of toothpaste    Toileting (eval):   Completes toilet hygiene independently, parent reports inefficiency with wiping often  Toileting (7/11/2024):   states he's able to clean effectively, step-dad did not disagree     Leisure: video " games, movies (super hero), outside sometimes, painting, WWE     Patient's Goals for Therapy: Shoe tying, independence with ADLs/IADLs, fine motor skills, handwriting    Objective     Hand Strength (SALOME Dynamometer, Setting II)   (lbs) Left Right   1 65 55   2 70 57   3 75 57   Avg 7/11/2024  70 56.3   Avg 3/5/2024 63.3 46.3   [norms for men aged 20-24: R=121.0 +/- 20.6; L=104.5 +/- 21.8 (Mathiowetz et al, 1985)]     Pinch Left  3/5/2024  Right  3/5/2024    Lateral 20.5 18   Tip (2 point) 12 9   3 jaw lili 12 10      Gross motor coordination:   LING (Rapid Alternating Movements): impaired  Finger to Nose (5 times): impaired  Finger Flicks (coordination moving from digit flexion to digit extension): slightly sluggish     Box and Block Assessment Left Right   7/11/2024  27 26 (avg of 2)   3/5/2024 21 26   [norms for men aged 20-24: R=88.2 +/-8.8; L=86.4 +/-8.5 (Mathiowetz et al, 1985)]     Fine motor coordination:   -   Thumb to Finger Opposition: impaired      9 Hole Peg Test (9HPT) Left Right   7/11/2024  63s 62s   3/5/2024 53s 54s   [norms for men aged 21-25: R=16.41s +/-1.65s; L=17.5s +/-1.73s (Edy et al, 2003)]              Treatment     Emerson received the treatments listed below:     Therapeutic activities to improve functional performance for 45 minutes, including:    Shoes/socks on/off without difficulty, but with increased time, buttons with increased time. Reassessment for POC update / discharge as noted above.       Home Exercises and Education Provided     Education provided:   Discussed options for modifying how Emerson is holding his cup at home. Step-dad v/u.   -[facility] making at home    Written Home Exercises Provided: Patient instructed to cont prior HEP.  Exercises were reviewed and Emerson was able to demonstrate them prior to the end of the session.  Emerson demonstrated good  understanding of the HEP provided.     See EMR under Patient Instructions for exercises provided 3/8/2024.     Assessment      Emerson met 5/8 short-term goal and 2/6 long-term goals, as well as partially meeting a 3rd long-term goal based on reassessment and discussion with Emerson & his step-dad. At this point, family feels that Emerson is doing well. He has a minor concern about how Emerson holds a cup, but we were able to talk through options for addressing this. Additionally, as mom was unable to be at the visit, time (and contact info) were given so that she could call and express any additional concerns before the POC udpate was written. At this point (1 week later), she has not contacted OT. As such, we will d/c skilled OT today. As patient's brother is going away to college in the fall, it is reasonable to assume that patient may need to return to therapy at that time to increase skills with tasks that the brother has been doing, which may fall to Emerson in his brother's absence. Step-dad notified that we will need new orders from Emerson's medical provider should they wish to return to therapy. He v/u.     Patient's spiritual, cultural and educational needs considered and patient is agreeable to the plan of care and goals as stated below:     Goals:  Short Term Goals:  Short Term Goals (6 weeks) Status When Last Assessed  Progressing/ Met   1) Pt will complete HEP with minimal verbal cues from parent  MET  7/11/2024    2) Handwriting to be assessed and goal added Completed 3/8/2024. See goal below.  Completed 3/8/2024    3) Pt will increase  strength as evidenced by 5# increase on dynamometer bilaterally    (lbs) Left Right   Avg 7/11/2024  70 56.3   Avg 3/5/2024 63.3 46.3    MET 7/11/2024    4) Pt to be able to wash his back in the shower independently with adaptive equipment as needed  MET per patient/parent report 7/11/2024    5) Pts parent to report increased independence with oral hygiene including appropriate amount of toothpaste for task  MET  7/11/2024    6) Pt will tie his shoes with minimal verbal cues   MET  7/11/2024    7)  Pt will prepare a cold meal from start to finish using appropriately portioned ingredients with minimal verbal cues   Not met   8) New goal 3/8/2024: Patient will be able to copy 2 sentences from a book without skipping words with 80% legibility.   Not reassessed         LongTerm Goals (12 weeks) Status When Last Assessed  Progressing/ Met   1) Pt will increase his Box and Blocks score as evidenced by 10 block increase bilaterally to increase gross motor coordination Box and Block Assessment Left Right   7/11/2024  27 26 (avg of 2)   3/5/2024 21 26    Not met   2) Pt will increase fine motor coordination as evidenced by completion of 9 Hole Peg test in 40 seconds or less bilaterally   9 Hole Peg Test (9HPT) Left Right   7/11/2024  63s 62s   3/5/2024 53s 54s    Not met   3) Pt will increase  strength as evidenced by 10# increase on dynamometer bilaterally  (lbs) Left Right   Avg 7/11/2024  70 56.3   Avg 3/5/2024 63.3 46.3    Met on the right 7/11/2024    4) Pt will use butter knife appropriately with no safety concerns during meal preparation 5/5 attempts 4/4 attempts with no safety concerns on 3/28/2024 Met per parent report 7/11/2024    5)  Pt will perform UBD / LBD independently, including buttons, fasteners, tying shoes, and with correct orientation of clothing items   Not met   6) Pts parent will self report increased efficiency with toilet hygiene   MET 7/11/2024     Additional functional goals to be added as patient progresses and per his priorities.    Plan     D/C Skilled OT.    ERWIN Douglas LOTR

## 2024-07-16 ENCOUNTER — CLINICAL SUPPORT (OUTPATIENT)
Dept: REHABILITATION | Facility: HOSPITAL | Age: 19
End: 2024-07-16
Payer: COMMERCIAL

## 2024-07-16 DIAGNOSIS — R47.1 DYSARTHRIA: Primary | ICD-10-CM

## 2024-07-16 PROCEDURE — 92507 TX SP LANG VOICE COMM INDIV: CPT | Mod: PO

## 2024-07-16 NOTE — PROGRESS NOTES
[facility]  Speech Therapy Treatment Note- Neurological Rehabilitation  Date: 7/16/2024     Name: Allyson Estrada   MRN: [id number]                                                                            Therapy Diagnosis:   Encounter Diagnosis   Name Primary?    Dysarthria Yes       Physician: Jude Méndez MD  Physician Orders: Ambulatory Referral to Speech Therapy   Medical Diagnosis: Anoxic brain injury [G93.1]     Visit #/ Visits Authorized: 11/ 20  Date of Evaluation:  2/27/2024  Insurance Authorization Period: 2/28/2024 - 12/31/2024   Plan of Care Expiration Date:    4/23/2024, 5/14/2024, 8/13/2024  Extended Plan of Care:  not applicable    Progress Note: due 8/6/2024     Time In:  1419  Time Out:  1455  Total Billable Time: 36    Precautions: Standard and Communication  Subjective:   Patient reports: No major changes from previous session. Patient's mother reports patient continuing to complete The Breather exercises as instructed at home with no difficulty. Patient's mother reports noted improvement in vocal loudness noted in conversation.     He was compliant to home exercise program.   Response to previous treatment: good  Pain Scale: no pain indicated throughout session  Objective:         UNTIMED  Speech- Language- Voice Therapy     Short Term Goals: (6 weeks) Current Progress:   Patient will complete RBANS assessment to determine further speech therapy Plan of Care Goal Met 3/4/2024     2. Patient will  "recall 4/4 clear speech strategies with minimal assist      Progressing/ Not Met 7/16/2024    GOAL DISCONTINUED 5/28/2024     3. Patient will read aloud low level passage with independently use of clear speech strategies with 80% accuracy independently.       Progressing/ Not Met 7/16/2024    See goal 11 below    4. Patient will complete diaphragmatic breathing exercises by producing maximal exhalation via diaphragmic breathing with a duration of 5 seconds in 10 trials, consistently with minimal tactile and verbal clinician cues.       Progressing/ Not Met 7/16/2024    GOAL DISCONTINUED 5/28/2024   5. Patient will complete ENT assessment for clearance of PhoRTE exercises in speech therapy.      GOAL MET 5/3/2024   6. Patient will name objects with 90% accuracy independently.  Goal Met 4/17/2024     7. Patient will state the function of objects using 3+ word sentences with minimal cues.     Progressing/Not Met 7/16/2024  GOAL DISCONTINUED 5/28/2024   NEW GOAL 5/28/2024 8. Patient will successfully obtain respiratory muscle strength training (RMST) device and set baseline cmH20 level with functional effort level from Effort Scale.   GOAL MET 5/28/2024   NEW GOAL 5/28/2024 9. Patient will complete 2 breath sets/10 repetitions while following The Breather: Strength Training protocol independently to improve overall respiratory strength.     Progressing/Not Met 7/16/2024  Patient completed today 2 breath sets of 10 breaths with Inhale level on 6 and Exhale level 5 with visual support of The Breather  danika. Patient completed with no difficulty.    NEW GOAL 5/28/2024 10. Patient will maintain "In the Zone" effort (5-7 out of 10 on the Effort Scale) during The Breather: Strength Training to increase overall respiratory strength .    Progressing/Not Met 7/16/2024  Patient rated each breath set completed today as In The Zone on 2/2 sets     NEW GOAL 5/28/2024 11.Patient will rate his speech while reading as at least a 2 " on a 3 point scale ( 1 = same as before beginning therapy, 2 =better but not best, 3 =best possible outcome) on 7 /10 trials.     Progressing/Not Met 2024  Patient developed list of sentence with Speech Language Pathologist today. Patient read aloud with rating 2 across 4/20 trials and rating of 3 on 16/20 trials. Patient needed moderate verbal cues to improve use of diaphragmatic breathing in tasks.        Patient Education/Response:   Patient and mom (to ensure carryover) educated regarding the followin. Using his loud voice when communicating   2. The Breather protocol and reasoning as well as bringing Breather to speech therapy sessions  3. Speech therapy Plan of Care     Home program established: Patient instructed to continue prior program  Patient verbalized understanding to all above education provided.     See Electronic Medical Record under Patient Instructions for exercises provided throughout therapy.  Assessment:   Patient with needing significant cues throughout the session for use of loud voice. Patient needing moderate cues in tasks today to improve breath support in tasks. Patient's mother completed education regarding speech therapy Plan of Care/availability, The Breather protocol, and how to use device - parent voiced understanding. Patient provided written out guideline of The Breather protocol and list of sentences generated today with Speech Language Pathologist in previous speech therapy session. Patient's speech intelligibility roughly the same since last seen in speech therapy.     Emerson is progressing well towards his goals. Current goals remain appropriate. Goals to be updated as necessary.     Patient prognosis is Good. Patient will continue to benefit from skilled outpatient speech and language therapy to address the deficits listed in the problem list on initial evaluation, provide patient/family education and to maximize patient's level of independence in the home and  community environment.   Medical necessity is demonstrated by the following IMPAIRMENTS:  Motor speech: Decreased speech intelligibility results in decreased efficiency communicating medical and social wants and needs in the case of emergency.    Barriers to Therapy: none identified   Patient's spiritual, cultural and educational needs considered and patient agreeable to plan of care and goals.  Plan:   Continued speech therapy to focus on breath support with use of The Breather to improve dysarthria.     Hilda Diop CCC-SLP   7/16/2024

## 2024-07-23 ENCOUNTER — CLINICAL SUPPORT (OUTPATIENT)
Dept: REHABILITATION | Facility: HOSPITAL | Age: 19
End: 2024-07-23
Payer: COMMERCIAL

## 2024-07-23 DIAGNOSIS — R47.1 DYSARTHRIA: Primary | ICD-10-CM

## 2024-07-23 PROCEDURE — 92507 TX SP LANG VOICE COMM INDIV: CPT | Mod: PO

## 2024-07-23 NOTE — PROGRESS NOTES
[facility]  Speech Therapy Treatment Note- Neurological Rehabilitation  Date: 7/23/2024     Name: Allyson Estrada   MRN: [id number]                                                                            Therapy Diagnosis:   Encounter Diagnosis   Name Primary?    Dysarthria Yes     Physician: Jude Méndez MD  Physician Orders: Ambulatory Referral to Speech Therapy   Medical Diagnosis: Anoxic brain injury [G93.1]     Visit #/ Visits Authorized: 12/ 20  Date of Evaluation:  2/27/2024  Insurance Authorization Period: 2/28/2024 - 12/31/2024   Plan of Care Expiration Date:    4/23/2024, 5/14/2024, 8/13/2024  Extended Plan of Care:  not applicable    Progress Note: due 8/6/2024     Time In:  1415  Time Out:  1445  Total Billable Time: 30    Precautions: Standard and Communication  Subjective:   Patient reports: No major changes from previous session. Patient's mother reports patient continuing to complete The Breather exercises as instructed at home with no difficulty. Discussed with mother continued use of cues (SLAP strategies) as best practice for patient's speech intelligibility at home - she voiced agreement and understanding    He was compliant to home exercise program.   Response to previous treatment: good  Pain Scale: no pain indicated throughout session  Objective:         UNTIMED  Speech- Language- Voice Therapy     Short Term Goals: (6 weeks) Current Progress:   Patient will complete RBANS assessment to  "determine further speech therapy Plan of Care Goal Met 3/4/2024     2. Patient will recall 4/4 clear speech strategies with minimal assist      Progressing/ Not Met 7/23/2024    GOAL DISCONTINUED 5/28/2024     3. Patient will read aloud low level passage with independently use of clear speech strategies with 80% accuracy independently.       Progressing/ Not Met 7/23/2024    See goal 11 below    4. Patient will complete diaphragmatic breathing exercises by producing maximal exhalation via diaphragmic breathing with a duration of 5 seconds in 10 trials, consistently with minimal tactile and verbal clinician cues.       Progressing/ Not Met 7/23/2024    GOAL DISCONTINUED 5/28/2024   5. Patient will complete ENT assessment for clearance of PhoRTE exercises in speech therapy.      GOAL MET 5/3/2024   6. Patient will name objects with 90% accuracy independently.  Goal Met 4/17/2024     7. Patient will state the function of objects using 3+ word sentences with minimal cues.     Progressing/Not Met 7/23/2024  GOAL DISCONTINUED 5/28/2024   NEW GOAL 5/28/2024 8. Patient will successfully obtain respiratory muscle strength training (RMST) device and set baseline cmH20 level with functional effort level from Effort Scale.   GOAL MET 5/28/2024   NEW GOAL 5/28/2024 9. Patient will complete 2 breath sets/10 repetitions while following The Breather: Strength Training protocol independently to improve overall respiratory strength.     Progressing/Not Met 7/23/2024  Patient completed today 2 breath sets of 10 breaths with Inhale level on 6 and Exhale level 5 with visual support of The Breather  danika. Patient completed with no difficulty.    NEW GOAL 5/28/2024 10. Patient will maintain "In the Zone" effort (5-7 out of 10 on the Effort Scale) during The Breather: Strength Training to increase overall respiratory strength .    Progressing/Not Met 7/23/2024  Patient rated each breath set completed today as In The Zone (phrasing as " easy vs estuardo hard vs hard) on 2/2 sets      NEW GOAL 2024 11.Patient will rate his speech while reading as at least a 2 on a 3 point scale ( 1 = same as before beginning therapy, 2 =better but not best, 3 =best possible outcome) on 7 /10 trials.     Progressing/Not Met 2024  Patient read aloud list of conversational sentences today in improve self monitoring of clear speech strategies + improved breath support needed for motor speech. Patient read aloud with rating of 1 across 4/20 trials, rating of 2 across 7/20 trials,and rating of 3 on /20 trials. Patient needed moderate-max verbal cues to improve use of diaphragmatic breathing in tasks.        Patient Education/Response:   Patient and mom (to ensure carryover) educated regarding the followin. Using his loud voice when communicating   2. The Breather protocol and reasoning as well as bringing Breather to speech therapy sessions  3. Speech therapy Plan of Care     Home program established: Patient instructed to continue prior program  Patient verbalized understanding to all above education provided.     See Electronic Medical Record under Patient Instructions for exercises provided throughout therapy.  Assessment:   Patient with needing significant cues throughout the session for use of loud voice. Patient needing moderate cues in tasks today to improve breath support in tasks. Patient's mother completed education regarding speech therapy Plan of Care/availability, The Breather protocol, and how to use device - parent voiced understanding. Patient's speech intelligibility roughly the same since last seen in speech therapy. All questions answered at completion of session by mother and patient today.     Emerson is progressing well towards his goals. Current goals remain appropriate. Goals to be updated as necessary.     Patient prognosis is Good. Patient will continue to benefit from skilled outpatient speech and language therapy to address the  deficits listed in the problem list on initial evaluation, provide patient/family education and to maximize patient's level of independence in the home and community environment.   Medical necessity is demonstrated by the following IMPAIRMENTS:  Motor speech: Decreased speech intelligibility results in decreased efficiency communicating medical and social wants and needs in the case of emergency.    Barriers to Therapy: none identified   Patient's spiritual, cultural and educational needs considered and patient agreeable to plan of care and goals.  Plan:   Continued speech therapy to focus on breath support with use of The Breather to improve dysarthria.     Hilda Diop CCC-SLP   7/23/2024

## 2024-08-05 ENCOUNTER — CLINICAL SUPPORT (OUTPATIENT)
Dept: REHABILITATION | Facility: HOSPITAL | Age: 19
End: 2024-08-05
Payer: COMMERCIAL

## 2024-08-05 DIAGNOSIS — R47.1 DYSARTHRIA: Primary | ICD-10-CM

## 2024-08-05 PROCEDURE — 92507 TX SP LANG VOICE COMM INDIV: CPT | Mod: PO

## 2024-08-13 ENCOUNTER — CLINICAL SUPPORT (OUTPATIENT)
Dept: REHABILITATION | Facility: HOSPITAL | Age: 19
End: 2024-08-13
Payer: COMMERCIAL

## 2024-08-13 DIAGNOSIS — R47.1 DYSARTHRIA: Primary | ICD-10-CM

## 2024-08-13 PROCEDURE — 92507 TX SP LANG VOICE COMM INDIV: CPT | Mod: PO

## 2024-08-13 NOTE — PROGRESS NOTES
[facility]  Speech Therapy Treatment Note- Neurological Rehabilitation  Progress Note  Date: 8/13/2024     Name: Allyson Estrada   MRN: [id number]                                                                            Therapy Diagnosis:   Encounter Diagnosis   Name Primary?    Dysarthria Yes         Physician: Jude Méndez MD  Physician Orders: Ambulatory Referral to Speech Therapy   Medical Diagnosis: Anoxic brain injury [G93.1]     Visit #/ Visits Authorized: 13/ 20  Date of Evaluation:  2/27/2024  Insurance Authorization Period: 2/28/2024 - 12/31/2024   Plan of Care Expiration Date:    4/23/2024, 5/14/2024, 8/13/2024  Extended Plan of Care:  not applicable    Progress Note: 8/6/2024     Time In:  1517  Time Out:  1552  Total Billable Time: 35 minutes    Precautions: Standard and Communication  Subjective:   Patient reports: Patient reports doing The Breather exercises 2x/day at home. No reported difficulty completing. Patient's mother reports that there is still difficulty understanding patient and he continues to need constant reminders for breath support and loudness.     He was compliant to home exercise program.   Response to previous treatment: good  Pain Scale: no pain indicated throughout session  Objective:         UNTIMED  Speech- Language- Voice Therapy     Short Term Goals: (6 weeks) Current Progress:   Patient will complete RBANS assessment to determine further speech therapy Plan of Care Goal Met  "3/4/2024     2. Patient will recall 4/4 clear speech strategies with minimal assist      Progressing/ Not Met 8/13/2024    GOAL DISCONTINUED 5/28/2024     3. Patient will read aloud low level passage with independently use of clear speech strategies with 80% accuracy independently.       Progressing/ Not Met 8/13/2024    See goal 11 below    4. Patient will complete diaphragmatic breathing exercises by producing maximal exhalation via diaphragmic breathing with a duration of 5 seconds in 10 trials, consistently with minimal tactile and verbal clinician cues.       Progressing/ Not Met 8/13/2024    GOAL DISCONTINUED 5/28/2024   5. Patient will complete ENT assessment for clearance of PhoRTE exercises in speech therapy.      GOAL MET 5/3/2024   6. Patient will name objects with 90% accuracy independently.  Goal Met 4/17/2024         7. Patient will state the function of objects using 3+ word sentences with minimal cues.     Progressing/Not Met 8/13/2024  GOAL DISCONTINUED 5/28/2024   NEW GOAL 5/28/2024 8. Patient will successfully obtain respiratory muscle strength training (RMST) device and set baseline cmH20 level with functional effort level from Effort Scale.   GOAL MET 5/28/2024   NEW GOAL 5/28/2024 9. Patient will complete 2 breath sets/10 repetitions while following The Breather: Strength Training protocol independently to improve overall respiratory strength.     Progressing/Not Met 8/13/2024  -Not addressed this session, as patient's device fell on the floor as he was taking it out of the box. Patient and mother instructed to clean according to 's instructions (in box) before utilizing again       NEW GOAL 5/28/2024 10. Patient will maintain "In the Zone" effort (5-7 out of 10 on the Effort Scale) during The Breather: Strength Training to increase overall respiratory strength .    Progressing/Not Met 8/13/2024  -Not addressed this session, as patient's device fell on the floor as he was taking it " out of the box. Patient and mother instructed to clean according to 's instructions (in box) before utilizing again       NEW GOAL 2024 11.Patient will rate his speech while reading as at least a 2 on a 3 point scale ( 1 = same as before beginning therapy, 2 =better but not best, 3 =best possible outcome) on 7 /10 trials.     Progressing/Not Met 2024  Patient read aloud list of conversational sentences today to improve self monitoring of clear speech strategies and improve breath support needed for motor speech.     Patient read aloud with rating of 1 across 0/25 trials, rating of 2 across 10/25 trials,and rating of 3 on 15/25 trials. Patient needed moderate-max verbal cues to improve use of diaphragmatic breathing in tasks.     Patient increased his self-rating from 2 to 3 in 75% of opportunities during session by following clinician model of pauses and breaths for different phrases      Goal met x3 sessions     Patient Education/Response:   Patient and mom (to ensure carryover) educated regarding the followin. Using his loud voice when communicating   2. The Breather protocol and reasoning as well as bringing Breather to speech therapy sessions; cleaning instructions for device  3. Plan to discharge Speech Therapy services    Home program established: Patient instructed to continue prior program  Patient verbalized understanding to all above education provided.     See Electronic Medical Record under Patient Instructions for exercises provided throughout therapy.  Assessment:   Patient continues to present with severe dysarthria which negatively impacts overall intelligibility in connected speech. He continues to require significant cues throughout the session for use of loud voice and taking extra diaphragmatic breaths in between words in order to maintain volume. With cues to pause and take a breath between words, intelligibility improved and volume improved, however, there was no  evidence of carryover throughout session and patient needed consistent cuing. Unable to utilize The Breather during session this date due to patient accidentally dropping it on the floor when taking it out of the package during session. Patient and mother were instructed on how to clean the device with pamphlet in the device box to be utilized as a reference as well. Patient's mother completed education regarding speech therapy Plan of Care with today being last Speech Therapy visit, The Breather protocol, how to use the device, cleaning instructions, and cueing for intelligibility strategies. Patient's mother voiced understanding. Patient's speech intelligibility roughly the same since last seen in speech therapy with no appreciated carryover of strategies to this point. Plan to discharge Speech Therapy services this date.     Emerson is progressing well towards his goals. Current goals remain appropriate. Goals to be updated as necessary.     Patient prognosis is Fair. At this time, patient has reached the maximum potential for rehabilitation of current deficits.   Medical necessity is demonstrated by the following IMPAIRMENTS:  Motor speech: Decreased speech intelligibility results in decreased efficiency communicating medical and social wants and needs in the case of emergency.    Barriers to Therapy: none identified   Patient's spiritual, cultural and educational needs considered and patient agreeable to plan of care and goals.  Plan:   Discharge Speech Therapy services this date.     Pauline Davis CCC-SLP   8/13/2024

## 2024-08-14 ENCOUNTER — DOCUMENTATION ONLY (OUTPATIENT)
Dept: REHABILITATION | Facility: HOSPITAL | Age: 19
End: 2024-08-14
Payer: COMMERCIAL

## 2024-08-14 NOTE — PROGRESS NOTES
Outpatient Therapy Discharge Summary   Discharge Date: 8/14/2024   Name: Allyson Estrada  [facility] Number: [id number]  Therapy Diagnosis: No diagnosis found.  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 Speech Therapy Plan of Care has focused on a number of areas including clear speech strategies, oral/verbal expression, and utilizing The Breather to facilitate improved breath support for speech. Patient continues to require consistent moderate to maximum cues to utilize clear speech strategies and taking good diaphragmatic breaths before speaking. His overall speech intelligibility has not significantly improved due to lack of carryover of strategies both within session and outside of session. Minimal progress has been demonstrated across sessions during plans of care since initial evaluation. At this time, patient has reached the maximum potential for rehabilitation of current deficits.     Goals:   Short Term Goals:  Current Progress:   Patient will complete RBANS assessment to determine further speech therapy Plan of Care Goal Met 3/4/2024     2. Patient will recall 4/4 clear speech strategies with minimal assist      Progressing/ Not Met 5/28/2024    GOAL DISCONTINUED 5/28/2024      3. Patient will read aloud low level passage with independently use of clear speech strategies with 80% accuracy independently.       Progressing/ Not Met 5/28/2024    Not Met      4. Patient will complete diaphragmatic breathing exercises by producing maximal exhalation via diaphragmic breathing with a duration of 5 seconds in 10 trials, consistently with  "minimal tactile and verbal clinician cues.       Progressing/ Not Met 5/28/2024    GOAL DISCONTINUED 5/28/2024   5. Patient will complete ENT assessment for clearance of PhoRTE exercises in speech therapy.      GOAL MET 5/3/2024   6. Patient will name objects with 90% accuracy independently.  Goal Met 4/17/2024         7. Patient will state the function of objects using 3+ word sentences with minimal cues.      Progressing/Not Met 5/28/2024  GOAL DISCONTINUED 5/28/2024   NEW GOAL 5/28/2024 8. Patient will successfully obtain respiratory muscle strength training (RMST) device and set baseline cmH20 level with functional effort level from Effort Scale.   GOAL MET 5/28/2024   NEW GOAL 5/28/2024 9. Patient will complete 2 breath sets/10 repetitions while following The Breather: Strength Training protocol independently to improve overall respiratory strength.     Not met in session, however, patient and mother report compliance with The Breather protocol 2x/day   NEW GOAL 5/28/2024 10. Patient will maintain "In the Zone" effort (5-7 out of 10 on the Effort Scale) during The Breather: Strength Training to increase overall respiratory strength .    Not met in session, however, patient and mother report compliance with The Breather protocol 2x/day   NEW GOAL 5/28/2024 11.Patient will rate his speech while reading as at least a 2 on a 3 point scale ( 1 = same as before beginning therapy, 2 =better but not best, 3 =best possible outcome) on 7 /10 trials.  Goal Met 8/13/2024       Long Term Goals:  Long Term Goals: Current Progress:   He  will develop functional and intelligible speech and utilize compensatory strategies through the use of adequate labial and lingual function, increased articulatory precision and speech prosody.    Not met   2. He will use appropriate memory strategies to schedule and recall weekly activities, express needs and recall names to maintain safety and participate socially in functional living " environment.       Not met   3. He  will develop functional cognitive-linguistic based skills and utilize compensatory strategies to communicate wants and needs effectively to different conversational partners, maintain safety, and participate socially in functional living environment.        Not met   4. He  will demonstrate improved vocal quality and intonation at the conversation level to communicate basic medical and social needs in the functional living environment.       Not met        Discharge reason: Patient has reached the maximum rehab potential for the present time    Plan   This patient is discharged from Speech Therapy    Pauline Davis CCC-SLP   8/14/2024

## 2024-10-14 ENCOUNTER — PATIENT MESSAGE (OUTPATIENT)
Dept: FAMILY MEDICINE | Facility: CLINIC | Age: 19
End: 2024-10-14
Payer: COMMERCIAL

## 2025-01-27 ENCOUNTER — OFFICE VISIT (OUTPATIENT)
Dept: FAMILY MEDICINE | Facility: CLINIC | Age: 20
End: 2025-01-27
Payer: COMMERCIAL

## 2025-01-27 VITALS
TEMPERATURE: 98 F | DIASTOLIC BLOOD PRESSURE: 68 MMHG | BODY MASS INDEX: 31.56 KG/M2 | HEIGHT: 67 IN | RESPIRATION RATE: 16 BRPM | OXYGEN SATURATION: 98 % | SYSTOLIC BLOOD PRESSURE: 128 MMHG | WEIGHT: 201.06 LBS | HEART RATE: 67 BPM

## 2025-01-27 DIAGNOSIS — R62.50 DEVELOPMENT DELAY: ICD-10-CM

## 2025-01-27 DIAGNOSIS — Z00.00 ROUTINE GENERAL MEDICAL EXAMINATION AT A HEALTH CARE FACILITY: Primary | ICD-10-CM

## 2025-01-27 DIAGNOSIS — G93.1 ANOXIC BRAIN INJURY: ICD-10-CM

## 2025-01-27 PROCEDURE — 3078F DIAST BP <80 MM HG: CPT | Mod: CPTII,S$GLB,,

## 2025-01-27 PROCEDURE — 3008F BODY MASS INDEX DOCD: CPT | Mod: CPTII,S$GLB,,

## 2025-01-27 PROCEDURE — 99395 PREV VISIT EST AGE 18-39: CPT | Mod: S$GLB,,,

## 2025-01-27 PROCEDURE — 1159F MED LIST DOCD IN RCRD: CPT | Mod: CPTII,S$GLB,,

## 2025-01-27 PROCEDURE — 1160F RVW MEDS BY RX/DR IN RCRD: CPT | Mod: CPTII,S$GLB,,

## 2025-01-27 PROCEDURE — 3074F SYST BP LT 130 MM HG: CPT | Mod: CPTII,S$GLB,,

## 2025-01-27 PROCEDURE — 99999 PR PBB SHADOW E&M-EST. PATIENT-LVL III: CPT | Mod: PBBFAC,,,
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
